# Patient Record
Sex: MALE | Race: WHITE | URBAN - METROPOLITAN AREA
[De-identification: names, ages, dates, MRNs, and addresses within clinical notes are randomized per-mention and may not be internally consistent; named-entity substitution may affect disease eponyms.]

---

## 2017-07-11 ENCOUNTER — INPATIENT (INPATIENT)
Facility: HOSPITAL | Age: 57
LOS: 2 days | Discharge: PSYCHIATRIC OR SUBSTANCE USE FACILITY | End: 2017-07-14
Attending: INTERNAL MEDICINE

## 2017-07-11 DIAGNOSIS — R41.82 ALTERED MENTAL STATUS, UNSPECIFIED: ICD-10-CM

## 2017-07-11 DIAGNOSIS — F20.9 SCHIZOPHRENIA, UNSPECIFIED: ICD-10-CM

## 2017-07-11 DIAGNOSIS — R56.9 UNSPECIFIED CONVULSIONS: ICD-10-CM

## 2017-07-11 DIAGNOSIS — F25.9 SCHIZOAFFECTIVE DISORDER, UNSPECIFIED: ICD-10-CM

## 2017-07-14 ENCOUNTER — INPATIENT (INPATIENT)
Facility: HOSPITAL | Age: 57
LOS: 1 days | Discharge: ANOTHER HOSPITAL | End: 2017-07-16
Attending: PSYCHIATRY & NEUROLOGY

## 2017-07-14 DIAGNOSIS — R56.9 UNSPECIFIED CONVULSIONS: ICD-10-CM

## 2017-07-14 DIAGNOSIS — F20.9 SCHIZOPHRENIA, UNSPECIFIED: ICD-10-CM

## 2017-07-14 DIAGNOSIS — F25.9 SCHIZOAFFECTIVE DISORDER, UNSPECIFIED: ICD-10-CM

## 2017-07-14 DIAGNOSIS — R41.82 ALTERED MENTAL STATUS, UNSPECIFIED: ICD-10-CM

## 2017-07-16 ENCOUNTER — INPATIENT (INPATIENT)
Facility: HOSPITAL | Age: 57
LOS: 0 days | Discharge: PSYCHIATRIC OR SUBSTANCE USE FACILITY | End: 2017-07-17
Attending: HOSPITALIST

## 2017-07-16 DIAGNOSIS — R41.82 ALTERED MENTAL STATUS, UNSPECIFIED: ICD-10-CM

## 2017-07-16 DIAGNOSIS — F25.9 SCHIZOAFFECTIVE DISORDER, UNSPECIFIED: ICD-10-CM

## 2017-07-16 DIAGNOSIS — R56.9 UNSPECIFIED CONVULSIONS: ICD-10-CM

## 2017-07-16 DIAGNOSIS — F20.9 SCHIZOPHRENIA, UNSPECIFIED: ICD-10-CM

## 2017-07-17 ENCOUNTER — INPATIENT (INPATIENT)
Facility: HOSPITAL | Age: 57
LOS: 91 days | Discharge: NURSING, REHAB OR LONG-TERM CARE | End: 2017-10-17
Attending: PSYCHIATRY & NEUROLOGY

## 2017-07-17 DIAGNOSIS — F20.9 SCHIZOPHRENIA, UNSPECIFIED: ICD-10-CM

## 2017-07-17 DIAGNOSIS — F25.9 SCHIZOAFFECTIVE DISORDER, UNSPECIFIED: ICD-10-CM

## 2017-07-17 DIAGNOSIS — R56.9 UNSPECIFIED CONVULSIONS: ICD-10-CM

## 2017-07-17 DIAGNOSIS — R41.82 ALTERED MENTAL STATUS, UNSPECIFIED: ICD-10-CM

## 2017-07-18 DIAGNOSIS — N17.9 ACUTE KIDNEY FAILURE, UNSPECIFIED: ICD-10-CM

## 2017-07-18 DIAGNOSIS — G40.909 EPILEPSY, UNSPECIFIED, NOT INTRACTABLE, WITHOUT STATUS EPILEPTICUS: ICD-10-CM

## 2017-07-18 DIAGNOSIS — E87.1 HYPO-OSMOLALITY AND HYPONATREMIA: ICD-10-CM

## 2017-07-18 DIAGNOSIS — F20.9 SCHIZOPHRENIA, UNSPECIFIED: ICD-10-CM

## 2017-07-18 DIAGNOSIS — F45.8 OTHER SOMATOFORM DISORDERS: ICD-10-CM

## 2017-07-18 DIAGNOSIS — E87.2 ACIDOSIS: ICD-10-CM

## 2017-07-18 DIAGNOSIS — E87.6 HYPOKALEMIA: ICD-10-CM

## 2017-07-18 DIAGNOSIS — I10 ESSENTIAL (PRIMARY) HYPERTENSION: ICD-10-CM

## 2017-07-18 DIAGNOSIS — R63.1 POLYDIPSIA: ICD-10-CM

## 2017-07-18 DIAGNOSIS — I21.4 NON-ST ELEVATION (NSTEMI) MYOCARDIAL INFARCTION: ICD-10-CM

## 2017-07-18 DIAGNOSIS — Z78.1 PHYSICAL RESTRAINT STATUS: ICD-10-CM

## 2017-07-18 DIAGNOSIS — Z53.29 PROCEDURE AND TREATMENT NOT CARRIED OUT BECAUSE OF PATIENT'S DECISION FOR OTHER REASONS: ICD-10-CM

## 2017-07-18 DIAGNOSIS — M62.82 RHABDOMYOLYSIS: ICD-10-CM

## 2017-07-18 DIAGNOSIS — R41.0 DISORIENTATION, UNSPECIFIED: ICD-10-CM

## 2017-07-20 DIAGNOSIS — E78.5 HYPERLIPIDEMIA, UNSPECIFIED: ICD-10-CM

## 2017-07-20 DIAGNOSIS — F17.200 NICOTINE DEPENDENCE, UNSPECIFIED, UNCOMPLICATED: ICD-10-CM

## 2017-07-20 DIAGNOSIS — I80.8 PHLEBITIS AND THROMBOPHLEBITIS OF OTHER SITES: ICD-10-CM

## 2017-07-20 DIAGNOSIS — L03.114 CELLULITIS OF LEFT UPPER LIMB: ICD-10-CM

## 2017-07-20 DIAGNOSIS — F20.9 SCHIZOPHRENIA, UNSPECIFIED: ICD-10-CM

## 2017-07-20 DIAGNOSIS — R45.1 RESTLESSNESS AND AGITATION: ICD-10-CM

## 2017-07-20 DIAGNOSIS — G40.909 EPILEPSY, UNSPECIFIED, NOT INTRACTABLE, WITHOUT STATUS EPILEPTICUS: ICD-10-CM

## 2017-07-20 DIAGNOSIS — I10 ESSENTIAL (PRIMARY) HYPERTENSION: ICD-10-CM

## 2017-07-20 DIAGNOSIS — E22.2 SYNDROME OF INAPPROPRIATE SECRETION OF ANTIDIURETIC HORMONE: ICD-10-CM

## 2017-07-20 DIAGNOSIS — R41.0 DISORIENTATION, UNSPECIFIED: ICD-10-CM

## 2017-07-28 DIAGNOSIS — F20.0 PARANOID SCHIZOPHRENIA: ICD-10-CM

## 2017-07-28 DIAGNOSIS — F23 BRIEF PSYCHOTIC DISORDER: ICD-10-CM

## 2017-07-28 DIAGNOSIS — R63.1 POLYDIPSIA: ICD-10-CM

## 2017-07-28 DIAGNOSIS — I10 ESSENTIAL (PRIMARY) HYPERTENSION: ICD-10-CM

## 2017-07-28 DIAGNOSIS — I21.4 NON-ST ELEVATION (NSTEMI) MYOCARDIAL INFARCTION: ICD-10-CM

## 2017-07-28 DIAGNOSIS — G40.909 EPILEPSY, UNSPECIFIED, NOT INTRACTABLE, WITHOUT STATUS EPILEPTICUS: ICD-10-CM

## 2017-07-28 DIAGNOSIS — E87.1 HYPO-OSMOLALITY AND HYPONATREMIA: ICD-10-CM

## 2017-10-12 PROBLEM — Z00.00 ENCOUNTER FOR PREVENTIVE HEALTH EXAMINATION: Status: ACTIVE | Noted: 2017-10-12

## 2017-10-19 DIAGNOSIS — I21.4 NON-ST ELEVATION (NSTEMI) MYOCARDIAL INFARCTION: ICD-10-CM

## 2017-10-19 DIAGNOSIS — I25.10 ATHEROSCLEROTIC HEART DISEASE OF NATIVE CORONARY ARTERY WITHOUT ANGINA PECTORIS: ICD-10-CM

## 2017-10-19 DIAGNOSIS — R45.851 SUICIDAL IDEATIONS: ICD-10-CM

## 2017-10-19 DIAGNOSIS — Z95.5 PRESENCE OF CORONARY ANGIOPLASTY IMPLANT AND GRAFT: ICD-10-CM

## 2017-10-19 DIAGNOSIS — I10 ESSENTIAL (PRIMARY) HYPERTENSION: ICD-10-CM

## 2017-10-19 DIAGNOSIS — F25.9 SCHIZOAFFECTIVE DISORDER, UNSPECIFIED: ICD-10-CM

## 2017-10-19 DIAGNOSIS — E87.1 HYPO-OSMOLALITY AND HYPONATREMIA: ICD-10-CM

## 2017-10-19 DIAGNOSIS — G40.909 EPILEPSY, UNSPECIFIED, NOT INTRACTABLE, WITHOUT STATUS EPILEPTICUS: ICD-10-CM

## 2017-10-19 DIAGNOSIS — F20.0 PARANOID SCHIZOPHRENIA: ICD-10-CM

## 2018-09-20 ENCOUNTER — OUTPATIENT (OUTPATIENT)
Dept: OUTPATIENT SERVICES | Facility: HOSPITAL | Age: 58
LOS: 1 days | Discharge: HOME | End: 2018-09-20

## 2018-11-21 ENCOUNTER — OUTPATIENT (OUTPATIENT)
Dept: OUTPATIENT SERVICES | Facility: HOSPITAL | Age: 58
LOS: 1 days | Discharge: HOME | End: 2018-11-21

## 2018-11-30 DIAGNOSIS — K02.63 DENTAL CARIES ON SMOOTH SURFACE PENETRATING INTO PULP: ICD-10-CM

## 2019-05-06 ENCOUNTER — EMERGENCY (EMERGENCY)
Facility: HOSPITAL | Age: 59
LOS: 0 days | Discharge: HOME | End: 2019-05-06
Admitting: EMERGENCY MEDICINE
Payer: MEDICARE

## 2019-05-06 VITALS
SYSTOLIC BLOOD PRESSURE: 170 MMHG | WEIGHT: 229.94 LBS | TEMPERATURE: 98 F | DIASTOLIC BLOOD PRESSURE: 118 MMHG | OXYGEN SATURATION: 99 % | RESPIRATION RATE: 18 BRPM | HEART RATE: 86 BPM

## 2019-05-06 DIAGNOSIS — M54.9 DORSALGIA, UNSPECIFIED: ICD-10-CM

## 2019-05-06 PROCEDURE — 99283 EMERGENCY DEPT VISIT LOW MDM: CPT

## 2019-05-06 RX ORDER — IBUPROFEN 200 MG
600 TABLET ORAL ONCE
Qty: 0 | Refills: 0 | Status: COMPLETED | OUTPATIENT
Start: 2019-05-06 | End: 2019-05-06

## 2019-05-06 RX ADMIN — Medication 600 MILLIGRAM(S): at 01:37

## 2019-05-06 NOTE — ED PROVIDER NOTE - NSFOLLOWUPCLINICS_GEN_ALL_ED_FT
[facility]  Rehabilitation  [address] [place] [address]  Phone: [phone]  Fax:   Follow Up Time:

## 2019-05-06 NOTE — ED PROVIDER NOTE - PROGRESS NOTE DETAILS
c/w mechanical back pain.  no neuro red flags.  sx are reproducible with position. aorta/cardiac not supported.  imaging not indicated at this time.  r/b of curtis sutton pt.

## 2019-05-06 NOTE — ED PROVIDER NOTE - OBJECTIVE STATEMENT
"I have a sprain in my spinal cord for 2 years"  pt reports pain when using showerhead on hose for 2 years  has not seen anyone for sxs  pain is sharp, nonradiating, moderate, intermittent  denies exacerbating or relieving factors  Denies radiation pain/incontinence/numbness/saddle parathesia/legs weakness and difficulty on ambulation. denies fever/chill/HA/dizziness/chest pain/sob/abd pain/n/v/d/ urinary sxs

## 2019-05-06 NOTE — ED PROVIDER NOTE - PHYSICAL EXAMINATION
CONSTITUTIONAL: Well-appearing; well-nourished; in no apparent distress.   CARDIOVASCULAR: Normal S1, S2; no murmurs, rubs, or gallops.   RESPIRATORY: Normal chest excursion with respiration; breath sounds clear and equal bilaterally; no wheezes, rhonchi, or rales.  GI/: Normal bowel sounds; non-distended; non-tender; no palpable organomegaly.   MS: No evidence of trauma or deformity. Non-tender to palpation. No CVA tenderness. Normal ROM in all four extremities; non-tender to palpation; distal pulses are normal.   SKIN: Normal for age and race; warm; dry; good turgor; no apparent lesions or exudate.   NEURO/PSYCH: A & O x 4; grossly unremarkable. mood and manner are appropriate. Grooming and personal hygiene are appropriate. No apparent thoughts of harm to self or others.

## 2019-05-08 VITALS — HEART RATE: 89 BPM | OXYGEN SATURATION: 99 %

## 2019-06-14 ENCOUNTER — EMERGENCY (EMERGENCY)
Facility: HOSPITAL | Age: 59
LOS: 0 days | Discharge: HOME | End: 2019-06-14
Attending: EMERGENCY MEDICINE | Admitting: EMERGENCY MEDICINE
Payer: MEDICARE

## 2019-06-14 VITALS
RESPIRATION RATE: 18 BRPM | SYSTOLIC BLOOD PRESSURE: 163 MMHG | OXYGEN SATURATION: 98 % | DIASTOLIC BLOOD PRESSURE: 93 MMHG | HEART RATE: 56 BPM

## 2019-06-14 VITALS
RESPIRATION RATE: 17 BRPM | SYSTOLIC BLOOD PRESSURE: 139 MMHG | DIASTOLIC BLOOD PRESSURE: 99 MMHG | HEART RATE: 63 BPM | OXYGEN SATURATION: 94 % | TEMPERATURE: 98 F

## 2019-06-14 DIAGNOSIS — F32.9 MAJOR DEPRESSIVE DISORDER, SINGLE EPISODE, UNSPECIFIED: ICD-10-CM

## 2019-06-14 DIAGNOSIS — I10 ESSENTIAL (PRIMARY) HYPERTENSION: ICD-10-CM

## 2019-06-14 DIAGNOSIS — Z79.82 LONG TERM (CURRENT) USE OF ASPIRIN: ICD-10-CM

## 2019-06-14 DIAGNOSIS — Z79.899 OTHER LONG TERM (CURRENT) DRUG THERAPY: ICD-10-CM

## 2019-06-14 DIAGNOSIS — Z88.7 ALLERGY STATUS TO SERUM AND VACCINE: ICD-10-CM

## 2019-06-14 DIAGNOSIS — F20.9 SCHIZOPHRENIA, UNSPECIFIED: ICD-10-CM

## 2019-06-14 PROCEDURE — 99284 EMERGENCY DEPT VISIT MOD MDM: CPT

## 2019-06-14 NOTE — ED PROVIDER NOTE - ATTENDING CONTRIBUTION TO CARE
58 year old male, pmhx HTN, presenting with elevated BP from adult home. Per facility patient's BP has been 170s systolic x 4 days. Patient states he had 1 episode of lightheadedness last week but has been asymptomatic since then. States he has been taking his medications as prescribed. Denies fevers, headache, vision changes, weakness/numbness, confusion, URI symptoms, neck pain, chest pain, back pain, dyspnea, cough, palpitations, nausea, vomiting, abdominal pain, diarrhea, constipation, blood in stool/dark stools, urinary symptoms, penile discharge/testicular pain, leg swelling, rash, recent travel or sick contacts.    Vital Signs: I have reviewed the initial vital signs.  Constitutional: NAD, well-nourished, appears stated age, no acute distress.  HEENT: Airway patent, moist MM, no erythema/swelling/deformity of oral structures. EOMI, PERRLA.  CV: regular rate, regular rhythm, well-perfused extremities, 2+ b/l DP and radial pulses equal.  Lungs: BCTA, no increased WOB.  ABD: NTND, no guarding or rebound, no pulsatile mass, no hernias.   MSK: Neck supple, nontender, nl ROM, no stepoff. Chest nontender. Back nontender in TLS spine or to b/l bony structures or flanks. Ext nontender, nl rom, no deformity.   INTEG: Skin warm, dry, no rash.  NEURO: A&Ox3, normal strength, nl sensation throughout, normal speech.   PSYCH: Calm, cooperative, normal affect and interaction.    Fully neuro intact,  systolic at triage and then in 160s, equal both arms. Will obtain EKG for 1 episode of lightheadedness last week but patient otherwise asymptomatic HTN and therefore if no significant findings on EKG patient can be discharged back to adult home with PMD follow up.

## 2019-06-14 NOTE — ED PROVIDER NOTE - NSFOLLOWUPINSTRUCTIONS_ED_ALL_ED_FT
Hypertension  Hypertension, commonly called high blood pressure, is when the force of blood pumping through the arteries is too strong. The arteries are the blood vessels that carry blood from the heart throughout the body. Hypertension forces the heart to work harder to pump blood and may cause arteries to become narrow or stiff. Having untreated or uncontrolled hypertension can cause heart attacks, strokes, kidney disease, and other problems.    A blood pressure reading consists of a higher number over a lower number. Ideally, your blood pressure should be below 120/80. The first ("top") number is called the systolic pressure. It is a measure of the pressure in your arteries as your heart beats. The second ("bottom") number is called the diastolic pressure. It is a measure of the pressure in your arteries as the heart relaxes.    What are the causes?  The cause of this condition is not known.    What increases the risk?  Some risk factors for high blood pressure are under your control. Others are not.    Factors you can change     Smoking.  Having type 2 diabetes mellitus, high cholesterol, or both.  Not getting enough exercise or physical activity.  Being overweight.  Having too much fat, sugar, calories, or salt (sodium) in your diet.  Drinking too much alcohol.  Factors that are difficult or impossible to change     Having chronic kidney disease.  Having a family history of high blood pressure.  Age. Risk increases with age.  Race. You may be at higher risk if you are -American.  Gender. Men are at higher risk than women before age 45. After age 65, women are at higher risk than men.  Having obstructive sleep apnea.  Stress.  What are the signs or symptoms?  Extremely high blood pressure (hypertensive crisis) may cause:    Headache.  Anxiety.  Shortness of breath.  Nosebleed.  Nausea and vomiting.  Severe chest pain.  Jerky movements you cannot control (seizures).    How is this diagnosed?  This condition is diagnosed by measuring your blood pressure while you are seated, with your arm resting on a surface. The cuff of the blood pressure monitor will be placed directly against the skin of your upper arm at the level of your heart. It should be measured at least twice using the same arm. Certain conditions can cause a difference in blood pressure between your right and left arms.    Certain factors can cause blood pressure readings to be lower or higher than normal (elevated) for a short period of time:    When your blood pressure is higher when you are in a health care provider's office than when you are at home, this is called white coat hypertension. Most people with this condition do not need medicines.  When your blood pressure is higher at home than when you are in a health care provider's office, this is called masked hypertension. Most people with this condition may need medicines to control blood pressure.    If you have a high blood pressure reading during one visit or you have normal blood pressure with other risk factors:    You may be asked to return on a different day to have your blood pressure checked again.  You may be asked to monitor your blood pressure at home for 1 week or longer.    If you are diagnosed with hypertension, you may have other blood or imaging tests to help your health care provider understand your overall risk for other conditions.    How is this treated?  This condition is treated by making healthy lifestyle changes, such as eating healthy foods, exercising more, and reducing your alcohol intake. Your health care provider may prescribe medicine if lifestyle changes are not enough to get your blood pressure under control, and if:    Your systolic blood pressure is above 130.  Your diastolic blood pressure is above 80.    Your personal target blood pressure may vary depending on your medical conditions, your age, and other factors.    Follow these instructions at home:  Eating and drinking     Eat a diet that is high in fiber and potassium, and low in sodium, added sugar, and fat. An example eating plan is called the DASH (Dietary Approaches to Stop Hypertension) diet. To eat this way:    Eat plenty of fresh fruits and vegetables. Try to fill half of your plate at each meal with fruits and vegetables.  Eat whole grains, such as whole wheat pasta, brown rice, or whole grain bread. Fill about one quarter of your plate with whole grains.  Eat or drink low-fat dairy products, such as skim milk or low-fat yogurt.  Avoid fatty cuts of meat, processed or cured meats, and poultry with skin. Fill about one quarter of your plate with lean proteins, such as fish, chicken without skin, beans, eggs, and tofu.  Avoid premade and processed foods. These tend to be higher in sodium, added sugar, and fat.    Reduce your daily sodium intake. Most people with hypertension should eat less than 1,500 mg of sodium a day.  ImageLimit alcohol intake to no more than 1 drink a day for nonpregnant women and 2 drinks a day for men. One drink equals 12 oz of beer, 5 oz of wine, or 1½ oz of hard liquor.  Lifestyle     Work with your health care provider to maintain a healthy body weight or to lose weight. Ask what an ideal weight is for you.  Get at least 30 minutes of exercise that causes your heart to beat faster (aerobic exercise) most days of the week. Activities may include walking, swimming, or biking.  Include exercise to strengthen your muscles (resistance exercise), such as pilates or lifting weights, as part of your weekly exercise routine. Try to do these types of exercises for 30 minutes at least 3 days a week.  Do not use any products that contain nicotine or tobacco, such as cigarettes and e-cigarettes. If you need help quitting, ask your health care provider.  Monitor your blood pressure at home as told by your health care provider.  Keep all follow-up visits as told by your health care provider. This is important.  Medicines     Take over-the-counter and prescription medicines only as told by your health care provider. Follow directions carefully. Blood pressure medicines must be taken as prescribed.  Do not skip doses of blood pressure medicine. Doing this puts you at risk for problems and can make the medicine less effective.  Ask your health care provider about side effects or reactions to medicines that you should watch for.  Contact a health care provider if:  You think you are having a reaction to a medicine you are taking.  You have headaches that keep coming back (recurring).  You feel dizzy.  You have swelling in your ankles.  You have trouble with your vision.  Get help right away if:  You develop a severe headache or confusion.  You have unusual weakness or numbness.  You feel faint.  You have severe pain in your chest or abdomen.  You vomit repeatedly.  You have trouble breathing.  Summary  Hypertension is when the force of blood pumping through your arteries is too strong. If this condition is not controlled, it may put you at risk for serious complications.  Your personal target blood pressure may vary depending on your medical conditions, your age, and other factors. For most people, a normal blood pressure is less than 120/80.  Hypertension is treated with lifestyle changes, medicines, or a combination of both. Lifestyle changes include weight loss, eating a healthy, low-sodium diet, exercising more, and limiting alcohol.  This information is not intended to replace advice given to you by your health care provider. Make sure you discuss any questions you have with your health care provider.

## 2019-06-14 NOTE — ED ADULT NURSE NOTE - CHIEF COMPLAINT QUOTE
BIBA via [facility] from [facility] for eval, pt complaining of lightheadedness for 1 week and HTN at facility 170/100, Denies Chest Pain, Denies Shortness of breath

## 2019-06-14 NOTE — ED ADULT TRIAGE NOTE - CHIEF COMPLAINT QUOTE
BIBA via [facility] from [facility] for eval, pt complaining of lightheadedness for 1 week and HTN at facility 170/100, Denies Chest Pain, Denies Shortness of breath

## 2019-06-14 NOTE — ED PROVIDER NOTE - CLINICAL SUMMARY MEDICAL DECISION MAKING FREE TEXT BOX
Patient presented with HTN from adult home, otherwise had 1 episode of lightheadedness last week which has since resolved. No other symptoms. On arrival BP in 130s systolic and then 160s systolic. EKG obtained and completely unremarkable. Given symptoms occurred 1 week ago and patient has been asymptomatic since, normal EKG means etiology unlikely cardiac. Given patient asymptomatic in the setting of mild HTN, will discharge back to adult home for PMD follow up and outpatient management. Patient agreeable with plan. Agrees to return to ED for any new or worsening symptoms.

## 2019-06-14 NOTE — ED PROVIDER NOTE - OBJECTIVE STATEMENT
Patient sent FROM adult home for HTN,  /100 for past 4 days, PMD not available, Patient compliant with his BP meds. Denies chest pain, SOB, HA, Sent to ED for eval. /99 on arrival

## 2019-07-15 ENCOUNTER — OUTPATIENT (OUTPATIENT)
Dept: OUTPATIENT SERVICES | Facility: HOSPITAL | Age: 59
LOS: 1 days | Discharge: HOME | End: 2019-07-15

## 2019-07-15 PROBLEM — F32.9 MAJOR DEPRESSIVE DISORDER, SINGLE EPISODE, UNSPECIFIED: Chronic | Status: ACTIVE | Noted: 2019-06-14

## 2019-07-15 PROBLEM — E78.5 HYPERLIPIDEMIA, UNSPECIFIED: Chronic | Status: ACTIVE | Noted: 2019-06-14

## 2019-07-15 PROBLEM — F20.9 SCHIZOPHRENIA, UNSPECIFIED: Chronic | Status: ACTIVE | Noted: 2019-06-14

## 2019-07-15 PROBLEM — I10 ESSENTIAL (PRIMARY) HYPERTENSION: Chronic | Status: ACTIVE | Noted: 2019-06-14

## 2019-07-17 ENCOUNTER — INPATIENT (INPATIENT)
Facility: HOSPITAL | Age: 59
LOS: 18 days | Discharge: HOME | End: 2019-08-05
Attending: PSYCHIATRY & NEUROLOGY | Admitting: PSYCHIATRY & NEUROLOGY
Payer: MEDICARE

## 2019-07-17 VITALS
WEIGHT: 149.91 LBS | HEART RATE: 84 BPM | OXYGEN SATURATION: 95 % | RESPIRATION RATE: 18 BRPM | TEMPERATURE: 99 F | SYSTOLIC BLOOD PRESSURE: 173 MMHG | DIASTOLIC BLOOD PRESSURE: 93 MMHG

## 2019-07-17 DIAGNOSIS — F32.3 MAJOR DEPRESSIVE DISORDER, SINGLE EPISODE, SEVERE WITH PSYCHOTIC FEATURES: ICD-10-CM

## 2019-07-17 DIAGNOSIS — F32.9 MAJOR DEPRESSIVE DISORDER, SINGLE EPISODE, UNSPECIFIED: ICD-10-CM

## 2019-07-17 DIAGNOSIS — R45.851 SUICIDAL IDEATIONS: ICD-10-CM

## 2019-07-17 LAB
ALBUMIN SERPL ELPH-MCNC: 4 G/DL — SIGNIFICANT CHANGE UP (ref 3.5–5.2)
ALP SERPL-CCNC: 76 U/L — SIGNIFICANT CHANGE UP (ref 30–115)
ALT FLD-CCNC: 8 U/L — SIGNIFICANT CHANGE UP (ref 0–41)
ANION GAP SERPL CALC-SCNC: 13 MMOL/L — SIGNIFICANT CHANGE UP (ref 7–14)
APAP SERPL-MCNC: <5 UG/ML — LOW (ref 10–30)
APPEARANCE UR: CLEAR — SIGNIFICANT CHANGE UP
AST SERPL-CCNC: 12 U/L — SIGNIFICANT CHANGE UP (ref 0–41)
BASOPHILS # BLD AUTO: 0.05 K/UL — SIGNIFICANT CHANGE UP (ref 0–0.2)
BASOPHILS NFR BLD AUTO: 0.7 % — SIGNIFICANT CHANGE UP (ref 0–1)
BILIRUB SERPL-MCNC: 0.2 MG/DL — SIGNIFICANT CHANGE UP (ref 0.2–1.2)
BILIRUB UR-MCNC: NEGATIVE — SIGNIFICANT CHANGE UP
BUN SERPL-MCNC: 11 MG/DL — SIGNIFICANT CHANGE UP (ref 10–20)
CALCIUM SERPL-MCNC: 9.4 MG/DL — SIGNIFICANT CHANGE UP (ref 8.5–10.1)
CHLORIDE SERPL-SCNC: 101 MMOL/L — SIGNIFICANT CHANGE UP (ref 98–110)
CO2 SERPL-SCNC: 24 MMOL/L — SIGNIFICANT CHANGE UP (ref 17–32)
COLOR SPEC: YELLOW — SIGNIFICANT CHANGE UP
CREAT SERPL-MCNC: 0.9 MG/DL — SIGNIFICANT CHANGE UP (ref 0.7–1.5)
DIFF PNL FLD: NEGATIVE — SIGNIFICANT CHANGE UP
EOSINOPHIL # BLD AUTO: 0.24 K/UL — SIGNIFICANT CHANGE UP (ref 0–0.7)
EOSINOPHIL NFR BLD AUTO: 3.5 % — SIGNIFICANT CHANGE UP (ref 0–8)
ETHANOL SERPL-MCNC: <10 MG/DL — SIGNIFICANT CHANGE UP
GLUCOSE SERPL-MCNC: 112 MG/DL — HIGH (ref 70–99)
GLUCOSE UR QL: NEGATIVE MG/DL — SIGNIFICANT CHANGE UP
HCT VFR BLD CALC: 37.9 % — LOW (ref 42–52)
HGB BLD-MCNC: 13.3 G/DL — LOW (ref 14–18)
IMM GRANULOCYTES NFR BLD AUTO: 0.3 % — SIGNIFICANT CHANGE UP (ref 0.1–0.3)
KETONES UR-MCNC: NEGATIVE — SIGNIFICANT CHANGE UP
LEUKOCYTE ESTERASE UR-ACNC: NEGATIVE — SIGNIFICANT CHANGE UP
LYMPHOCYTES # BLD AUTO: 2.26 K/UL — SIGNIFICANT CHANGE UP (ref 1.2–3.4)
LYMPHOCYTES # BLD AUTO: 33.2 % — SIGNIFICANT CHANGE UP (ref 20.5–51.1)
MCHC RBC-ENTMCNC: 28.7 PG — SIGNIFICANT CHANGE UP (ref 27–31)
MCHC RBC-ENTMCNC: 35.1 G/DL — SIGNIFICANT CHANGE UP (ref 32–37)
MCV RBC AUTO: 81.9 FL — SIGNIFICANT CHANGE UP (ref 80–94)
MONOCYTES # BLD AUTO: 0.71 K/UL — HIGH (ref 0.1–0.6)
MONOCYTES NFR BLD AUTO: 10.4 % — HIGH (ref 1.7–9.3)
NEUTROPHILS # BLD AUTO: 3.52 K/UL — SIGNIFICANT CHANGE UP (ref 1.4–6.5)
NEUTROPHILS NFR BLD AUTO: 51.9 % — SIGNIFICANT CHANGE UP (ref 42.2–75.2)
NITRITE UR-MCNC: NEGATIVE — SIGNIFICANT CHANGE UP
NRBC # BLD: 0 /100 WBCS — SIGNIFICANT CHANGE UP (ref 0–0)
PH UR: 7 — SIGNIFICANT CHANGE UP (ref 5–8)
PLATELET # BLD AUTO: 162 K/UL — SIGNIFICANT CHANGE UP (ref 130–400)
POTASSIUM SERPL-MCNC: 3.5 MMOL/L — SIGNIFICANT CHANGE UP (ref 3.5–5)
POTASSIUM SERPL-SCNC: 3.5 MMOL/L — SIGNIFICANT CHANGE UP (ref 3.5–5)
PROT SERPL-MCNC: 6.4 G/DL — SIGNIFICANT CHANGE UP (ref 6–8)
PROT UR-MCNC: NEGATIVE MG/DL — SIGNIFICANT CHANGE UP
RBC # BLD: 4.63 M/UL — LOW (ref 4.7–6.1)
RBC # FLD: 13.3 % — SIGNIFICANT CHANGE UP (ref 11.5–14.5)
SALICYLATES SERPL-MCNC: <0.3 MG/DL — LOW (ref 4–30)
SODIUM SERPL-SCNC: 138 MMOL/L — SIGNIFICANT CHANGE UP (ref 135–146)
SP GR SPEC: 1.01 — SIGNIFICANT CHANGE UP (ref 1.01–1.03)
UROBILINOGEN FLD QL: 0.2 MG/DL — SIGNIFICANT CHANGE UP (ref 0.2–0.2)
WBC # BLD: 6.8 K/UL — SIGNIFICANT CHANGE UP (ref 4.8–10.8)
WBC # FLD AUTO: 6.8 K/UL — SIGNIFICANT CHANGE UP (ref 4.8–10.8)

## 2019-07-17 PROCEDURE — 99285 EMERGENCY DEPT VISIT HI MDM: CPT

## 2019-07-17 PROCEDURE — 90792 PSYCH DIAG EVAL W/MED SRVCS: CPT

## 2019-07-17 RX ORDER — BENZTROPINE MESYLATE 1 MG
1 TABLET ORAL
Qty: 0 | Refills: 0 | DISCHARGE

## 2019-07-17 RX ORDER — HYDROXYZINE HCL 10 MG
25 TABLET ORAL EVERY 6 HOURS
Refills: 0 | Status: DISCONTINUED | OUTPATIENT
Start: 2019-07-17 | End: 2019-08-05

## 2019-07-17 RX ORDER — METOPROLOL TARTRATE 50 MG
1 TABLET ORAL
Qty: 0 | Refills: 0 | DISCHARGE

## 2019-07-17 RX ORDER — QUETIAPINE FUMARATE 200 MG/1
1 TABLET, FILM COATED ORAL
Qty: 0 | Refills: 0 | DISCHARGE

## 2019-07-17 RX ORDER — DOCUSATE SODIUM 100 MG
1 CAPSULE ORAL
Qty: 0 | Refills: 0 | DISCHARGE

## 2019-07-17 RX ORDER — VALPROIC ACID (AS SODIUM SALT) 250 MG/5ML
500 SOLUTION, ORAL ORAL
Qty: 0 | Refills: 0 | DISCHARGE

## 2019-07-17 RX ORDER — SODIUM CHLORIDE 9 MG/ML
0 INJECTION INTRAMUSCULAR; INTRAVENOUS; SUBCUTANEOUS
Qty: 0 | Refills: 0 | DISCHARGE

## 2019-07-17 RX ADMIN — Medication 25 MILLIGRAM(S): at 21:57

## 2019-07-17 NOTE — ED PROVIDER NOTE - NS ED ROS FT
Review of Systems:  	•	CONSTITUTIONAL - no fever, no diaphoresis, no chills  	•	SKIN - no rash  	•	HEMATOLOGIC - no bleeding, no bruising  	•	EYES - no eye pain, no blurry vision  	•	ENT - no change in hearing, no sore throat, no ear pain or tinnitus  	•	RESPIRATORY - no shortness of breath, no cough  	•	CARDIAC - no chest pain, no palpitations  	•	GI - no abd pain, no nausea, no vomiting, no diarrhea, no constipation    	•	MUSCULOSKELETAL - no joint paint, no swelling, no redness  	•	NEUROLOGIC - no weakness, no headache, no paresthesias, no LOC  	•	PSYCH - no anxiety, suicidal, non homicidal, no hallucination, no depression

## 2019-07-17 NOTE — ED ADULT NURSE NOTE - NSIMPLEMENTINTERV_GEN_ALL_ED
Implemented All Fall with Harm Risk Interventions:  [place] to call system. Call bell, personal items and telephone within reach. Instruct patient to call for assistance. Room bathroom lighting operational. Non-slip footwear when patient is off stretcher. Physically safe environment: no spills, clutter or unnecessary equipment. Stretcher in lowest position, wheels locked, appropriate side rails in place. Provide visual cue, wrist band, yellow gown, etc. Monitor gait and stability. Monitor for mental status changes and reorient to person, place, and time. Review medications for side effects contributing to fall risk. Reinforce activity limits and safety measures with patient and family. Provide visual clues: red socks.

## 2019-07-17 NOTE — H&P ADULT - HISTORY OF PRESENT ILLNESS
58 YEARS OLD MALE WITH PMH OF DEPRESSION, HTN , HYPERLIPIDEMIA AND SCHIZOPHRENIA COME TO ER C/O SUICIDAL THOUGHTS .

## 2019-07-17 NOTE — ED BEHAVIORAL HEALTH ASSESSMENT NOTE - EMPLOYMENT
Formulary for [employer] insurance noted with Lansoprazole 30 mg as preferred PPI.     RX send to pharmacy as ordered.    Message left for pt to call the office.  dillon       Disabled

## 2019-07-17 NOTE — H&P ADULT - NSHPPHYSICALEXAM_GEN_ALL_CORE
Vital Signs Last 24 Hrs  T(C): 37.1 (07-17-19 @ 15:52)  T(F): 98.8 (07-17-19 @ 15:52), Max: 98.8 (07-17-19 @ 15:52)  HR: 76 (07-17-19 @ 22:00) (76 - 84)  BP: 160/75 (07-17-19 @ 22:00)  BP(mean): --  RR: 18 (07-17-19 @ 15:52) (18 - 18)  SpO2: 95% (07-17-19 @ 15:52) (95% - 95%)  Wt(kg): --

## 2019-07-17 NOTE — ED PROVIDER NOTE - OBJECTIVE STATEMENT
this is 59 yo male presents to ed for evaluation . patient from [facility] presents for suicidal thoughts.

## 2019-07-17 NOTE — PATIENT PROFILE BEHAVIORAL HEALTH - DOES PATIENT HAVE ADVANCE DIRECTIVE
Left message for patient to contact office at [phone].     pt. uncooperative and not answering some questons/No

## 2019-07-17 NOTE — ED PROVIDER NOTE - ATTENDING CONTRIBUTION TO CARE
59 yo M PMHx noted including depression, schizophrenia, HTN presents from residence with c/o depression , having suicidal thoughts, thinking of harming self with scissor,  no AV hallucination, no CP, n SOB.  On exam pt in NAD AAO x 3, no signs of trauma . steady gait, Lungs CAT B/L no wrr, abd is soft nt nd, no edema

## 2019-07-17 NOTE — H&P ADULT - NSHPLABSRESULTS_GEN_ALL_CORE
13.3   6.80  )-----------( 162      ( 2019 19:30 )             37.9       07-17    138  |  101  |  11  ----------------------------<  112<H>  3.5   |  24  |  0.9    Ca    9.4      2019 19:30    TPro  6.4  /  Alb  4.0  /  TBili  0.2  /  DBili  x   /  AST  12  /  ALT  8   /  AlkPhos  76  07-              Urinalysis Basic - ( 2019 19:30 )    Color: Yellow / Appearance: Clear / S.015 / pH: x  Gluc: x / Ketone: Negative  / Bili: Negative / Urobili: 0.2 mg/dL   Blood: x / Protein: Negative mg/dL / Nitrite: Negative   Leuk Esterase: Negative / RBC: x / WBC x   Sq Epi: x / Non Sq Epi: x / Bacteria: x            Lactate Trend            CAPILLARY BLOOD GLUCOSE

## 2019-07-17 NOTE — ED BEHAVIORAL HEALTH ASSESSMENT NOTE - HPI (INCLUDE ILLNESS QUALITY, SEVERITY, DURATION, TIMING, CONTEXT, MODIFYING FACTORS, ASSOCIATED SIGNS AND SYMPTOMS)
58 yrs. old male, resident of [facility], increasingly depressed and having a suicidal ideation. Pt. stated residents of adult home are bothering him for cigar and making him angry. So he is feeling depressed and wanted cut his wrist. He is feeling hopeless and helpless. he is sleeping good. He has suicidal thoughts since last one month. He stated that he did not want to live any more and wanted to end his life. He stated he is taking his medications but did not remember names. He denied hearing voices but stated he is seeing things that are not there. He is feeing paranoid. He stated that he was at [facility] in 2015 and 2017. His medications changed but has severe side effects and so stopped and restarted again.  Collateral obtained from brother who stated that he was sent to [facility] for same thing twice and was released. He feels that it is unsafe for him into community.

## 2019-07-17 NOTE — ED BEHAVIORAL HEALTH ASSESSMENT NOTE - SUMMARY
58 yrs. old male with H/O depression ,feeling hopeless and helpless with suicidal ideations ,wanted to cut his wrist. He is mild paranoid. He has been having suicidal ideations since last moths and had frequent ER visits. His brother is very concern.

## 2019-07-18 DIAGNOSIS — Z01.20 ENCOUNTER FOR DENTAL EXAMINATION AND CLEANING WITHOUT ABNORMAL FINDINGS: ICD-10-CM

## 2019-07-18 DIAGNOSIS — I10 ESSENTIAL (PRIMARY) HYPERTENSION: ICD-10-CM

## 2019-07-18 DIAGNOSIS — Z02.9 ENCOUNTER FOR ADMINISTRATIVE EXAMINATIONS, UNSPECIFIED: ICD-10-CM

## 2019-07-18 LAB
AMPHET UR-MCNC: NEGATIVE — SIGNIFICANT CHANGE UP
BARBITURATES UR SCN-MCNC: NEGATIVE — SIGNIFICANT CHANGE UP
BENZODIAZ UR-MCNC: NEGATIVE — SIGNIFICANT CHANGE UP
COCAINE METAB.OTHER UR-MCNC: NEGATIVE — SIGNIFICANT CHANGE UP
CULTURE RESULTS: NO GROWTH — SIGNIFICANT CHANGE UP
DRUG SCREEN 1, URINE RESULT: SIGNIFICANT CHANGE UP
METHADONE UR-MCNC: NEGATIVE — SIGNIFICANT CHANGE UP
OPIATES UR-MCNC: NEGATIVE — SIGNIFICANT CHANGE UP
PCP UR-MCNC: NEGATIVE — SIGNIFICANT CHANGE UP
PROPOXYPHENE QUALITATIVE URINE RESULT: NEGATIVE — SIGNIFICANT CHANGE UP
SPECIMEN SOURCE: SIGNIFICANT CHANGE UP
THC UR QL: NEGATIVE — SIGNIFICANT CHANGE UP

## 2019-07-18 PROCEDURE — 99232 SBSQ HOSP IP/OBS MODERATE 35: CPT

## 2019-07-18 RX ORDER — DIVALPROEX SODIUM 500 MG/1
500 TABLET, DELAYED RELEASE ORAL
Refills: 0 | Status: DISCONTINUED | OUTPATIENT
Start: 2019-07-18 | End: 2019-07-26

## 2019-07-18 RX ORDER — DIPHENHYDRAMINE HCL 50 MG
50 CAPSULE ORAL ONCE
Refills: 0 | Status: COMPLETED | OUTPATIENT
Start: 2019-07-18 | End: 2019-07-18

## 2019-07-18 RX ORDER — AMLODIPINE BESYLATE 2.5 MG/1
5 TABLET ORAL DAILY
Refills: 0 | Status: DISCONTINUED | OUTPATIENT
Start: 2019-07-18 | End: 2019-07-18

## 2019-07-18 RX ORDER — BENZTROPINE MESYLATE 1 MG
0.5 TABLET ORAL
Refills: 0 | Status: DISCONTINUED | OUTPATIENT
Start: 2019-07-18 | End: 2019-08-05

## 2019-07-18 RX ORDER — HALOPERIDOL DECANOATE 100 MG/ML
10 INJECTION INTRAMUSCULAR
Refills: 0 | Status: DISCONTINUED | OUTPATIENT
Start: 2019-07-18 | End: 2019-08-05

## 2019-07-18 RX ORDER — NICOTINE POLACRILEX 2 MG
1 GUM BUCCAL DAILY
Refills: 0 | Status: DISCONTINUED | OUTPATIENT
Start: 2019-07-18 | End: 2019-08-05

## 2019-07-18 RX ORDER — QUETIAPINE FUMARATE 200 MG/1
100 TABLET, FILM COATED ORAL DAILY
Refills: 0 | Status: DISCONTINUED | OUTPATIENT
Start: 2019-07-18 | End: 2019-08-05

## 2019-07-18 RX ORDER — LOSARTAN POTASSIUM 100 MG/1
100 TABLET, FILM COATED ORAL DAILY
Refills: 0 | Status: DISCONTINUED | OUTPATIENT
Start: 2019-07-18 | End: 2019-08-05

## 2019-07-18 RX ORDER — QUETIAPINE FUMARATE 200 MG/1
300 TABLET, FILM COATED ORAL AT BEDTIME
Refills: 0 | Status: DISCONTINUED | OUTPATIENT
Start: 2019-07-18 | End: 2019-08-05

## 2019-07-18 RX ORDER — AMLODIPINE BESYLATE 2.5 MG/1
10 TABLET ORAL AT BEDTIME
Refills: 0 | Status: DISCONTINUED | OUTPATIENT
Start: 2019-07-18 | End: 2019-08-05

## 2019-07-18 RX ADMIN — Medication 1 PATCH: at 23:42

## 2019-07-18 RX ADMIN — AMLODIPINE BESYLATE 5 MILLIGRAM(S): 2.5 TABLET ORAL at 08:46

## 2019-07-18 RX ADMIN — Medication 25 MILLIGRAM(S): at 08:49

## 2019-07-18 RX ADMIN — LOSARTAN POTASSIUM 100 MILLIGRAM(S): 100 TABLET, FILM COATED ORAL at 08:46

## 2019-07-18 RX ADMIN — Medication 2 MILLIGRAM(S): at 20:34

## 2019-07-18 RX ADMIN — Medication 50 MILLIGRAM(S): at 11:42

## 2019-07-18 RX ADMIN — QUETIAPINE FUMARATE 300 MILLIGRAM(S): 200 TABLET, FILM COATED ORAL at 20:35

## 2019-07-18 RX ADMIN — AMLODIPINE BESYLATE 10 MILLIGRAM(S): 2.5 TABLET ORAL at 20:34

## 2019-07-18 RX ADMIN — Medication 25 MILLIGRAM(S): at 20:33

## 2019-07-18 RX ADMIN — DIVALPROEX SODIUM 500 MILLIGRAM(S): 500 TABLET, DELAYED RELEASE ORAL at 20:34

## 2019-07-18 RX ADMIN — Medication 2 MILLIGRAM(S): at 11:43

## 2019-07-18 RX ADMIN — Medication 1 PATCH: at 11:43

## 2019-07-18 RX ADMIN — Medication 0.5 MILLIGRAM(S): at 20:34

## 2019-07-18 RX ADMIN — HALOPERIDOL DECANOATE 10 MILLIGRAM(S): 100 INJECTION INTRAMUSCULAR at 20:34

## 2019-07-18 RX ADMIN — Medication 15 MILLIGRAM(S): at 20:34

## 2019-07-18 NOTE — PROGRESS NOTE BEHAVIORAL HEALTH - NSBHFUPADDHPIFT_PSY_A_CORE
Patient has been a resident there for 6 years. He has been compliant with medications. Patient has had chronic suicidal ideation, however having a plan, "use roommates scissors to cut wrist" was new. Patient is normally not a behavioral issue and is able to return once stable. No more information on history.     Left message with patient's brother, Ulises, [phone].

## 2019-07-18 NOTE — CHART NOTE - NSCHARTNOTEFT_GEN_A_CORE
Patient is a 58 year old male, resident of [facility] admitted to unit emergency involuntary status due to suicidal plan and intent . Patient reports low mood, isolations, feelings of hopelessness and helplessness. Patient has had a strong desire to slit his wrist and end his life. Patient has multiple prior IPP admissions with similar presentation. Patient admitted to unit 939 for treatment safety and observation. Please see social work psychosocial assessment for more information.

## 2019-07-18 NOTE — CONSULT NOTE ADULT - SUBJECTIVE AND OBJECTIVE BOX
SAM REA  58y  Male      Patient is a 58y old  Male who presents with a chief complaint of 58 YEARS OLD MALE C/O SUICIDAL THOUGHTS . (2019 21:59)    HPI:  58 YEARS OLD MALE WITH PMH OF DEPRESSION, HTN , HYPERLIPIDEMIA AND SCHIZOPHRENIA COME TO ER C/O SUICIDAL THOUGHTS . (2019 21:59)    INTERVAL HPI/OVERNIGHT EVENTS:  HEALTH ISSUES - PROBLEM Dx:  Suicidal thoughts: Suicidal thoughts  Depression: Depression  Major depression with psychotic features        PAST MEDICAL & SURGICAL HISTORY:  Schizophrenia  Hyperlipemia  Depression  HTN (hypertension)  No significant past surgical history    FAMILY HISTORY:    amLODIPine   Tablet 5 milliGRAM(s) Oral daily  hydrOXYzine hydrochloride 25 milliGRAM(s) Oral every 6 hours PRN  losartan 100 milliGRAM(s) Oral daily      REVIEW OF SYSTEMS:  CONSTITUTIONAL: No fever, weight loss, or fatigue  EYES: No eye pain, visual disturbances, or discharge  ENMT:  No difficulty hearing, tinnitus, vertigo; No sinus or throat pain  NECK: No pain or stiffness  BREASTS: No pain, masses, or nipple discharge  RESPIRATORY: No cough, wheezing, chills or hemoptysis; No shortness of breath  CARDIOVASCULAR: No chest pain, palpitations, dizziness, or leg swelling  GASTROINTESTINAL: No abdominal or epigastric pain. No nausea, vomiting, or hematemesis; No diarrhea or constipation. No melena or hematochezia.  GENITOURINARY: No dysuria, frequency, hematuria, or incontinence  NEUROLOGICAL: No headaches, memory loss, loss of strength, numbness, or tremors  SKIN: No itching, burning, rashes, or lesions   LYMPH NODES: No enlarged glands  ENDOCRINE: No heat or cold intolerance; No hair loss  MUSCULOSKELETAL: No joint pain or swelling; No muscle, back, or extremity pain  PSYCHIATRIC: as per hpi and previous psych history  HEME/LYMPH: No easy bruising, or bleeding gums  ALLERY AND IMMUNOLOGIC: No hives or eczema    T(C): 37.1 (19 @ 06:04), Max: 37.1 (19 @ 15:52)  HR: 75 (19 @ 06:52) (75 - 84)  BP: 145/100 (19 @ 06:52) (145/100 - 173/93)  RR: 19 (19 @ 06:04) (18 - 19)  SpO2: 95% (07-17-19 @ 15:52) (95% - 95%)  Wt(kg): --Vital Signs Last 24 Hrs  T(C): 37.1 (2019 06:04), Max: 37.1 (2019 15:52)  T(F): 98.7 (2019 06:04), Max: 98.8 (2019 15:52)  HR: 75 (2019 06:52) (75 - 84)  BP: 145/100 (2019 06:52) (145/100 - 173/93)  BP(mean): --  RR: 19 (2019 06:04) (18 - 19)  SpO2: 95% (2019 15:52) (95% - 95%)    PHYSICAL EXAM:  GENERAL: NAD,well-developed  HEAD:  Atraumatic, Normocephalic  EYES: EOMI, PERRLA, conjunctiva and sclera clear  ENMT: No tonsillar erythema, exudates, or enlargement; Moist mucous membranes, Good dentition, No lesions  NECK: Supple, No JVD, Normal thyroid  CHEST/LUNG: Clear bs bilaterally; No rales, rhonchi, wheezing  HEART: Regular rate and rhythm; No murmurs, rubs, or gallops  ABDOMEN: Soft, Nontender, Nondistended; Bowel sounds present  EXTREMITIES:  2+ Peripheral Pulses, No clubbing, cyanosis, or edema  LYMPH: No lymphadenopathy noted  SKIN: No rashes or lesions  Neuro: alert  no focal deficits    Consultant(s) Notes Reviewed:  [x ] YES  [ ] NO  Care Discussed with Consultants/Other Providers [ x] YES  [ ] NO    LABS:                        13.3   6.80  )-----------( 162      ( 2019 19:30 )             37.9     07-17    138  |  101  |  11  ----------------------------<  112<H>  3.5   |  24  |  0.9    Ca    9.4      2019 19:30    TPro  6.4  /  Alb  4.0  /  TBili  0.2  /  DBili  x   /  AST  12  /  ALT  8   /  AlkPhos  76  07-17      Urinalysis Basic - ( 2019 19:30 )    Color: Yellow / Appearance: Clear / S.015 / pH: x  Gluc: x / Ketone: Negative  / Bili: Negative / Urobili: 0.2 mg/dL   Blood: x / Protein: Negative mg/dL / Nitrite: Negative   Leuk Esterase: Negative / RBC: x / WBC x   Sq Epi: x / Non Sq Epi: x / Bacteria: x      CAPILLARY BLOOD GLUCOSE            Urinalysis Basic - ( 2019 19:30 )    Color: Yellow / Appearance: Clear / S.015 / pH: x  Gluc: x / Ketone: Negative  / Bili: Negative / Urobili: 0.2 mg/dL   Blood: x / Protein: Negative mg/dL / Nitrite: Negative   Leuk Esterase: Negative / RBC: x / WBC x   Sq Epi: x / Non Sq Epi: x / Bacteria: x        RADIOLOGY & ADDITIONAL TESTS:    Imaging Personally Reviewed:  [ ] YES  [ ] NO

## 2019-07-18 NOTE — PROGRESS NOTE BEHAVIORAL HEALTH - NSBHFUPINTERVALHXFT_PSY_A_CORE
Patient seen and examined during morning report, chart reviewed. Patient endorses that he is here because his "medications" made him "suicidal". He is unsure what he has been taking at his residence. He endorses suicidal ideation, unable to identify trigger. Patient says that he has been feeling like this for the past three days. He informs that he would take his roommates "scissors" or "punch the window". Patient also acknowledges high levels of anxiety, without identifiable source. Patient informs he has been sleeping and eating well. When asked about AH, he replies "don't we all hear some voices". He denies symptoms of agustín or psychosis. Patient seen and examined during morning report, chart reviewed. Patient fairly poor historian. Patient endorses that he is here because his "medications" made him "suicidal". He is unsure what he has been taking at his residence. He endorses suicidal ideation, unable to identify trigger. Patient says that he has been feeling like this for the past three days. He informs that he would take his roommates "scissors" or "punch the window". When asked what prevents him from doing so, patient acknowledges " I guess GOD above me". Patient also acknowledges high levels of anxiety, without identifiable source. Patient informs he has been sleeping and eating well. When asked about AH, he replies "don't we all hear some voices". Though he has active ideation, he agrees that he can talk to staff if the thoughts become intrusive. He denies symptoms of agustín or psychosis.

## 2019-07-19 LAB
HCV AB S/CO SERPL IA: 0.13 S/CO — SIGNIFICANT CHANGE UP (ref 0–0.99)
HCV AB SERPL-IMP: SIGNIFICANT CHANGE UP
VALPROATE SERPL-MCNC: 48 UG/ML — LOW (ref 50–100)

## 2019-07-19 PROCEDURE — 99231 SBSQ HOSP IP/OBS SF/LOW 25: CPT

## 2019-07-19 RX ADMIN — LOSARTAN POTASSIUM 100 MILLIGRAM(S): 100 TABLET, FILM COATED ORAL at 08:37

## 2019-07-19 RX ADMIN — DIVALPROEX SODIUM 500 MILLIGRAM(S): 500 TABLET, DELAYED RELEASE ORAL at 08:38

## 2019-07-19 RX ADMIN — HALOPERIDOL DECANOATE 10 MILLIGRAM(S): 100 INJECTION INTRAMUSCULAR at 20:13

## 2019-07-19 RX ADMIN — Medication 1 PATCH: at 08:36

## 2019-07-19 RX ADMIN — Medication 15 MILLIGRAM(S): at 08:38

## 2019-07-19 RX ADMIN — AMLODIPINE BESYLATE 10 MILLIGRAM(S): 2.5 TABLET ORAL at 20:13

## 2019-07-19 RX ADMIN — Medication 0.5 MILLIGRAM(S): at 20:14

## 2019-07-19 RX ADMIN — Medication 1 PATCH: at 08:43

## 2019-07-19 RX ADMIN — QUETIAPINE FUMARATE 300 MILLIGRAM(S): 200 TABLET, FILM COATED ORAL at 20:14

## 2019-07-19 RX ADMIN — Medication 1 PATCH: at 08:41

## 2019-07-19 RX ADMIN — HALOPERIDOL DECANOATE 10 MILLIGRAM(S): 100 INJECTION INTRAMUSCULAR at 08:38

## 2019-07-19 RX ADMIN — QUETIAPINE FUMARATE 100 MILLIGRAM(S): 200 TABLET, FILM COATED ORAL at 08:37

## 2019-07-19 RX ADMIN — Medication 0.5 MILLIGRAM(S): at 08:38

## 2019-07-19 RX ADMIN — Medication 2 MILLIGRAM(S): at 20:15

## 2019-07-19 RX ADMIN — Medication 2 MILLIGRAM(S): at 08:41

## 2019-07-19 RX ADMIN — Medication 15 MILLIGRAM(S): at 20:13

## 2019-07-19 RX ADMIN — DIVALPROEX SODIUM 500 MILLIGRAM(S): 500 TABLET, DELAYED RELEASE ORAL at 20:13

## 2019-07-19 NOTE — PROGRESS NOTE BEHAVIORAL HEALTH - RISK ASSESSMENT
Patient at moderate/severe risk for self harm given acute mood episode, active suicidal ideation and plan, and history of previous suicidality, which are not fully mitigated by protective factors that include structured residential facility, medication compliance, sobriety, and spirituality. Subsequently, patient requires continue treatment in inpatient psychiatric facility until further stabilized.
Patient at moderate/severe risk for self harm given acute mood episode, active suicidal ideation and plan, and history of previous suicidality, which are not fully mitigated by protective factors that include structured residential facility, medication compliance, sobriety, and spirituality. Subsequently, patient requires continue treatment in inpatient psychiatric facility until further stabilized.

## 2019-07-19 NOTE — PROGRESS NOTE BEHAVIORAL HEALTH - NSBHFUPINTERVALHXFT_PSY_A_CORE
Patient seen and examined during morning rounds, chart reviewed. Patient continues to report suicidal ideation but he says they are "less today". He acknowledges that he can talk to staff if the thoughts become intrusive. He denies symptoms of agustín or psychosis.

## 2019-07-19 NOTE — CHART NOTE - NSCHARTNOTEFT_GEN_A_CORE
Mr. Simon remains on the unit for continued treatment, safety and observation. He was seen by  and treatment team during morning rounds. He declined team meeting. He remains isolative to his room and is encouraged by staff to be more visible on the unit. He is compliant with medication. Mr. Simon is less suicidal and denies homicidal ideation. ADL’s are poor as patient has been incontinent on the unit and nursing staff continues to encourage showering. He reports eating well.      will continue to meet with Mr. Simon one on one and with treatment team daily. Upon discharge, he will return to his residence at [facility]. Patient is not yet psychiatrically stable for discharge.

## 2019-07-20 PROCEDURE — 99231 SBSQ HOSP IP/OBS SF/LOW 25: CPT

## 2019-07-20 RX ORDER — ACETAMINOPHEN 500 MG
650 TABLET ORAL EVERY 6 HOURS
Refills: 0 | Status: DISCONTINUED | OUTPATIENT
Start: 2019-07-20 | End: 2019-08-05

## 2019-07-20 RX ORDER — IBUPROFEN 200 MG
400 TABLET ORAL EVERY 8 HOURS
Refills: 0 | Status: DISCONTINUED | OUTPATIENT
Start: 2019-07-20 | End: 2019-08-05

## 2019-07-20 RX ADMIN — DIVALPROEX SODIUM 500 MILLIGRAM(S): 500 TABLET, DELAYED RELEASE ORAL at 08:09

## 2019-07-20 RX ADMIN — Medication 1 PATCH: at 08:10

## 2019-07-20 RX ADMIN — Medication 400 MILLIGRAM(S): at 14:37

## 2019-07-20 RX ADMIN — HALOPERIDOL DECANOATE 10 MILLIGRAM(S): 100 INJECTION INTRAMUSCULAR at 08:09

## 2019-07-20 RX ADMIN — Medication 0.5 MILLIGRAM(S): at 08:09

## 2019-07-20 RX ADMIN — Medication 2 MILLIGRAM(S): at 08:09

## 2019-07-20 RX ADMIN — Medication 0.5 MILLIGRAM(S): at 20:10

## 2019-07-20 RX ADMIN — QUETIAPINE FUMARATE 100 MILLIGRAM(S): 200 TABLET, FILM COATED ORAL at 08:11

## 2019-07-20 RX ADMIN — Medication 15 MILLIGRAM(S): at 08:09

## 2019-07-20 RX ADMIN — Medication 1 PATCH: at 19:38

## 2019-07-20 RX ADMIN — DIVALPROEX SODIUM 500 MILLIGRAM(S): 500 TABLET, DELAYED RELEASE ORAL at 20:10

## 2019-07-20 RX ADMIN — QUETIAPINE FUMARATE 300 MILLIGRAM(S): 200 TABLET, FILM COATED ORAL at 20:10

## 2019-07-20 RX ADMIN — Medication 1 PATCH: at 08:09

## 2019-07-20 RX ADMIN — Medication 2 MILLIGRAM(S): at 20:11

## 2019-07-20 RX ADMIN — Medication 15 MILLIGRAM(S): at 20:10

## 2019-07-20 RX ADMIN — AMLODIPINE BESYLATE 10 MILLIGRAM(S): 2.5 TABLET ORAL at 20:10

## 2019-07-20 RX ADMIN — HALOPERIDOL DECANOATE 10 MILLIGRAM(S): 100 INJECTION INTRAMUSCULAR at 20:10

## 2019-07-20 RX ADMIN — Medication 400 MILLIGRAM(S): at 15:07

## 2019-07-20 RX ADMIN — LOSARTAN POTASSIUM 100 MILLIGRAM(S): 100 TABLET, FILM COATED ORAL at 08:16

## 2019-07-20 NOTE — PROGRESS NOTE BEHAVIORAL HEALTH - NSBHFUPINTERVALHXFT_PSY_A_CORE
Pt was seen, and evaluated, and chart was reviewed. No acute events overnight.  Pt wetted his bed. He claims he cant get the bathroom in time . An alarm jonah button will be supplied so pt can jonah for help.  Patient is alert, Ox3,  calm, cooperative, compliant with treatment. Patient is in good behavioral control.  ***Pt denies and presents no evidence for suicidal or homicidal plans or intentions.  Pt is not acutely psychotic and denies A/V H.  Continues current medication regimen.

## 2019-07-21 RX ADMIN — Medication 15 MILLIGRAM(S): at 20:19

## 2019-07-21 RX ADMIN — Medication 1 PATCH: at 07:38

## 2019-07-21 RX ADMIN — QUETIAPINE FUMARATE 300 MILLIGRAM(S): 200 TABLET, FILM COATED ORAL at 20:19

## 2019-07-21 RX ADMIN — HALOPERIDOL DECANOATE 10 MILLIGRAM(S): 100 INJECTION INTRAMUSCULAR at 08:36

## 2019-07-21 RX ADMIN — DIVALPROEX SODIUM 500 MILLIGRAM(S): 500 TABLET, DELAYED RELEASE ORAL at 08:36

## 2019-07-21 RX ADMIN — AMLODIPINE BESYLATE 10 MILLIGRAM(S): 2.5 TABLET ORAL at 20:19

## 2019-07-21 RX ADMIN — QUETIAPINE FUMARATE 100 MILLIGRAM(S): 200 TABLET, FILM COATED ORAL at 08:39

## 2019-07-21 RX ADMIN — Medication 2 MILLIGRAM(S): at 20:21

## 2019-07-21 RX ADMIN — Medication 1 PATCH: at 08:35

## 2019-07-21 RX ADMIN — Medication 2 MILLIGRAM(S): at 08:36

## 2019-07-21 RX ADMIN — DIVALPROEX SODIUM 500 MILLIGRAM(S): 500 TABLET, DELAYED RELEASE ORAL at 20:19

## 2019-07-21 RX ADMIN — LOSARTAN POTASSIUM 100 MILLIGRAM(S): 100 TABLET, FILM COATED ORAL at 08:45

## 2019-07-21 RX ADMIN — HALOPERIDOL DECANOATE 10 MILLIGRAM(S): 100 INJECTION INTRAMUSCULAR at 20:19

## 2019-07-21 RX ADMIN — Medication 1 PATCH: at 07:39

## 2019-07-21 RX ADMIN — Medication 0.5 MILLIGRAM(S): at 20:23

## 2019-07-21 RX ADMIN — Medication 15 MILLIGRAM(S): at 08:36

## 2019-07-21 RX ADMIN — Medication 0.5 MILLIGRAM(S): at 08:36

## 2019-07-22 PROCEDURE — 99231 SBSQ HOSP IP/OBS SF/LOW 25: CPT

## 2019-07-22 RX ADMIN — Medication 0.5 MILLIGRAM(S): at 08:23

## 2019-07-22 RX ADMIN — HALOPERIDOL DECANOATE 10 MILLIGRAM(S): 100 INJECTION INTRAMUSCULAR at 08:23

## 2019-07-22 RX ADMIN — QUETIAPINE FUMARATE 100 MILLIGRAM(S): 200 TABLET, FILM COATED ORAL at 08:23

## 2019-07-22 RX ADMIN — QUETIAPINE FUMARATE 300 MILLIGRAM(S): 200 TABLET, FILM COATED ORAL at 20:08

## 2019-07-22 RX ADMIN — Medication 1 PATCH: at 08:24

## 2019-07-22 RX ADMIN — DIVALPROEX SODIUM 500 MILLIGRAM(S): 500 TABLET, DELAYED RELEASE ORAL at 20:08

## 2019-07-22 RX ADMIN — Medication 2 MILLIGRAM(S): at 20:09

## 2019-07-22 RX ADMIN — DIVALPROEX SODIUM 500 MILLIGRAM(S): 500 TABLET, DELAYED RELEASE ORAL at 08:23

## 2019-07-22 RX ADMIN — Medication 15 MILLIGRAM(S): at 20:08

## 2019-07-22 RX ADMIN — Medication 2 MILLIGRAM(S): at 08:24

## 2019-07-22 RX ADMIN — Medication 15 MILLIGRAM(S): at 08:23

## 2019-07-22 RX ADMIN — HALOPERIDOL DECANOATE 10 MILLIGRAM(S): 100 INJECTION INTRAMUSCULAR at 20:08

## 2019-07-22 RX ADMIN — LOSARTAN POTASSIUM 100 MILLIGRAM(S): 100 TABLET, FILM COATED ORAL at 08:24

## 2019-07-22 RX ADMIN — Medication 0.5 MILLIGRAM(S): at 20:08

## 2019-07-22 RX ADMIN — AMLODIPINE BESYLATE 10 MILLIGRAM(S): 2.5 TABLET ORAL at 20:08

## 2019-07-22 NOTE — PROGRESS NOTE ADULT - PROBLEM SELECTOR PLAN 2
bp noted still high sdie  luis f continue to montior with current psych tx   no change in meds for now

## 2019-07-22 NOTE — PROGRESS NOTE BEHAVIORAL HEALTH - NSBHFUPINTERVALHXFT_PSY_A_CORE
Pt was seen and evaluated during morning rounds, chart was reviewed. No acute events overnight. Patient reports feeling weak and having trouble getting out of bed. Otherwise no complaints. Eating and sleeping well. He denies suicidal ideations, intent or plan. Pt is not acutely psychotic and denies A/V H.  Continues current medication regimen. Pt was seen and evaluated during morning rounds, chart was reviewed. No acute events overnight. Patient reports feeling weak and having trouble getting out of bed, but ambulates without assist. Otherwise no complaints. Eating and sleeping well. He denies suicidal ideations, intent or plan. Pt is not acutely psychotic and denies A/V H.  Continues current medication regimen.

## 2019-07-22 NOTE — PROGRESS NOTE BEHAVIORAL HEALTH - NSBHCHARTREVIEWVS_PSY_A_CORE FT
Vital Signs Last 24 Hrs  T(C): 37 (22 Jul 2019 06:01), Max: 37 (22 Jul 2019 06:01)  T(F): 98.6 (22 Jul 2019 06:01), Max: 98.6 (22 Jul 2019 06:01)  HR: 89 (22 Jul 2019 09:32) (77 - 91)  BP: 147/82 (22 Jul 2019 09:32) (137/77 - 164/94)  BP(mean): --  RR: 18 (22 Jul 2019 09:32) (18 - 20)  SpO2: --
Vital Signs Last 24 Hrs  T(C): 36.3 (18 Jul 2019 09:07), Max: 37.1 (17 Jul 2019 15:52)  T(F): 97.4 (18 Jul 2019 09:07), Max: 98.8 (17 Jul 2019 15:52)  HR: 81 (18 Jul 2019 09:07) (75 - 84)  BP: 180/87 (18 Jul 2019 09:07) (145/100 - 180/87)  BP(mean): --  RR: 18 (18 Jul 2019 09:07) (18 - 19)  SpO2: 95% (17 Jul 2019 15:52) (95% - 95%)
T(C): 36.1 (07-20-19 @ 18:50), Max: 36.1 (07-20-19 @ 18:50)  HR: 96 (07-20-19 @ 18:50) (89 - 96)  BP: 126/76 (07-20-19 @ 18:50) (122/82 - 135/91)  RR: 18 (07-20-19 @ 18:50) (16 - 18)  SpO2: --

## 2019-07-23 PROCEDURE — 99231 SBSQ HOSP IP/OBS SF/LOW 25: CPT

## 2019-07-23 RX ADMIN — DIVALPROEX SODIUM 500 MILLIGRAM(S): 500 TABLET, DELAYED RELEASE ORAL at 20:49

## 2019-07-23 RX ADMIN — LOSARTAN POTASSIUM 100 MILLIGRAM(S): 100 TABLET, FILM COATED ORAL at 09:32

## 2019-07-23 RX ADMIN — Medication 15 MILLIGRAM(S): at 09:32

## 2019-07-23 RX ADMIN — HALOPERIDOL DECANOATE 10 MILLIGRAM(S): 100 INJECTION INTRAMUSCULAR at 09:32

## 2019-07-23 RX ADMIN — Medication 1 PATCH: at 09:33

## 2019-07-23 RX ADMIN — Medication 1 PATCH: at 07:59

## 2019-07-23 RX ADMIN — HALOPERIDOL DECANOATE 10 MILLIGRAM(S): 100 INJECTION INTRAMUSCULAR at 20:49

## 2019-07-23 RX ADMIN — AMLODIPINE BESYLATE 10 MILLIGRAM(S): 2.5 TABLET ORAL at 20:49

## 2019-07-23 RX ADMIN — DIVALPROEX SODIUM 500 MILLIGRAM(S): 500 TABLET, DELAYED RELEASE ORAL at 09:32

## 2019-07-23 RX ADMIN — QUETIAPINE FUMARATE 100 MILLIGRAM(S): 200 TABLET, FILM COATED ORAL at 09:32

## 2019-07-23 RX ADMIN — Medication 0.5 MILLIGRAM(S): at 09:32

## 2019-07-23 RX ADMIN — QUETIAPINE FUMARATE 300 MILLIGRAM(S): 200 TABLET, FILM COATED ORAL at 20:49

## 2019-07-23 RX ADMIN — Medication 2 MILLIGRAM(S): at 09:32

## 2019-07-23 RX ADMIN — Medication 2 MILLIGRAM(S): at 20:51

## 2019-07-23 RX ADMIN — Medication 15 MILLIGRAM(S): at 20:49

## 2019-07-23 RX ADMIN — Medication 0.5 MILLIGRAM(S): at 20:49

## 2019-07-23 NOTE — PHYSICAL THERAPY INITIAL EVALUATION ADULT - IMPAIRMENTS FOUND, PT EVAL
ergonomics and body mechanics/gait, locomotion, and balance/aerobic capacity/endurance/muscle strength/posture

## 2019-07-23 NOTE — PROGRESS NOTE BEHAVIORAL HEALTH - NSBHFUPINTERVALHXFT_PSY_A_CORE
Pt was seen and evaluated during morning rounds, chart was reviewed. No acute events overnight. Patient remains isolative to room and to bed. When asked about when he would be ready, he reports "I don't want to go back and start smoking cigarettes". Otherwise no complaints. Eating and sleeping well. He denies suicidal ideations, intent or plan. Pt is not acutely psychotic and denies A/V H.  Continues current medication regimen.

## 2019-07-23 NOTE — PHYSICAL THERAPY INITIAL EVALUATION ADULT - TRANSFER SAFETY CONCERNS NOTED: SIT/STAND, REHAB EVAL
decreased weight-shifting ability/decreased balance during turns/losing balance/decreased step length/decreased safety awareness

## 2019-07-23 NOTE — PHYSICAL THERAPY INITIAL EVALUATION ADULT - GAIT DEVIATIONS NOTED, PT EVAL
decreased ina/decreased weight-shifting ability/forward flexed posture ; unsteady gait/decreased step length/increased time in double stance

## 2019-07-23 NOTE — PHYSICAL THERAPY INITIAL EVALUATION ADULT - GENERAL OBSERVATIONS, REHAB EVAL
13:35 - 14:00. Chart reviewed. Patient available to be seen for physical therapy, confirmed with nurse. Patient encountered already seated at edge of bed. Denies pain at rest, agreeable for PT evaluation.

## 2019-07-24 DIAGNOSIS — F25.0 SCHIZOAFFECTIVE DISORDER, BIPOLAR TYPE: ICD-10-CM

## 2019-07-24 PROCEDURE — 99231 SBSQ HOSP IP/OBS SF/LOW 25: CPT

## 2019-07-24 RX ADMIN — LOSARTAN POTASSIUM 100 MILLIGRAM(S): 100 TABLET, FILM COATED ORAL at 08:11

## 2019-07-24 RX ADMIN — Medication 650 MILLIGRAM(S): at 15:00

## 2019-07-24 RX ADMIN — DIVALPROEX SODIUM 500 MILLIGRAM(S): 500 TABLET, DELAYED RELEASE ORAL at 20:42

## 2019-07-24 RX ADMIN — Medication 1 PATCH: at 19:21

## 2019-07-24 RX ADMIN — Medication 15 MILLIGRAM(S): at 08:10

## 2019-07-24 RX ADMIN — DIVALPROEX SODIUM 500 MILLIGRAM(S): 500 TABLET, DELAYED RELEASE ORAL at 08:10

## 2019-07-24 RX ADMIN — QUETIAPINE FUMARATE 300 MILLIGRAM(S): 200 TABLET, FILM COATED ORAL at 20:42

## 2019-07-24 RX ADMIN — HALOPERIDOL DECANOATE 10 MILLIGRAM(S): 100 INJECTION INTRAMUSCULAR at 08:10

## 2019-07-24 RX ADMIN — Medication 15 MILLIGRAM(S): at 20:42

## 2019-07-24 RX ADMIN — Medication 1 PATCH: at 08:11

## 2019-07-24 RX ADMIN — AMLODIPINE BESYLATE 10 MILLIGRAM(S): 2.5 TABLET ORAL at 20:42

## 2019-07-24 RX ADMIN — Medication 2 MILLIGRAM(S): at 20:43

## 2019-07-24 RX ADMIN — Medication 2 MILLIGRAM(S): at 08:13

## 2019-07-24 RX ADMIN — Medication 0.5 MILLIGRAM(S): at 20:42

## 2019-07-24 RX ADMIN — Medication 1 PATCH: at 18:58

## 2019-07-24 RX ADMIN — QUETIAPINE FUMARATE 100 MILLIGRAM(S): 200 TABLET, FILM COATED ORAL at 08:10

## 2019-07-24 RX ADMIN — HALOPERIDOL DECANOATE 10 MILLIGRAM(S): 100 INJECTION INTRAMUSCULAR at 20:42

## 2019-07-24 RX ADMIN — Medication 0.5 MILLIGRAM(S): at 08:10

## 2019-07-24 NOTE — PROGRESS NOTE BEHAVIORAL HEALTH - NSBHFUPINTERVALCCFT_PSY_A_CORE
Pt is isolative and without acute c/o. He states he feels better and doesn't want to harm himself, but isnt ready to go back to his residence

## 2019-07-24 NOTE — CHART NOTE - NSCHARTNOTEFT_GEN_A_CORE
Pt. continues to remain isolative to his room.  There is no apparent psychosis present.  Pt denies any suicidal or homicidal ideations or any A/V hallucinations at this time.  Pt. states he is not ready to return to [facility] in part because he believes he will resume smoking cigarettes.  Once pt. is ready for discharge, he will return to his residence at [facility].  Pt. is not yet ready for discharge at this time.

## 2019-07-25 PROCEDURE — 99231 SBSQ HOSP IP/OBS SF/LOW 25: CPT

## 2019-07-25 RX ADMIN — Medication 1 PATCH: at 08:23

## 2019-07-25 RX ADMIN — Medication 2 MILLIGRAM(S): at 08:21

## 2019-07-25 RX ADMIN — QUETIAPINE FUMARATE 300 MILLIGRAM(S): 200 TABLET, FILM COATED ORAL at 20:33

## 2019-07-25 RX ADMIN — HALOPERIDOL DECANOATE 10 MILLIGRAM(S): 100 INJECTION INTRAMUSCULAR at 08:22

## 2019-07-25 RX ADMIN — Medication 1 PATCH: at 19:12

## 2019-07-25 RX ADMIN — AMLODIPINE BESYLATE 10 MILLIGRAM(S): 2.5 TABLET ORAL at 20:33

## 2019-07-25 RX ADMIN — Medication 15 MILLIGRAM(S): at 20:33

## 2019-07-25 RX ADMIN — HALOPERIDOL DECANOATE 10 MILLIGRAM(S): 100 INJECTION INTRAMUSCULAR at 20:33

## 2019-07-25 RX ADMIN — DIVALPROEX SODIUM 500 MILLIGRAM(S): 500 TABLET, DELAYED RELEASE ORAL at 08:22

## 2019-07-25 RX ADMIN — Medication 1 PATCH: at 08:24

## 2019-07-25 RX ADMIN — Medication 15 MILLIGRAM(S): at 08:22

## 2019-07-25 RX ADMIN — Medication 0.5 MILLIGRAM(S): at 20:33

## 2019-07-25 RX ADMIN — Medication 1 PATCH: at 06:06

## 2019-07-25 RX ADMIN — DIVALPROEX SODIUM 500 MILLIGRAM(S): 500 TABLET, DELAYED RELEASE ORAL at 20:33

## 2019-07-25 RX ADMIN — Medication 0.5 MILLIGRAM(S): at 08:21

## 2019-07-25 RX ADMIN — QUETIAPINE FUMARATE 100 MILLIGRAM(S): 200 TABLET, FILM COATED ORAL at 08:22

## 2019-07-25 RX ADMIN — Medication 2 MILLIGRAM(S): at 20:34

## 2019-07-25 RX ADMIN — LOSARTAN POTASSIUM 100 MILLIGRAM(S): 100 TABLET, FILM COATED ORAL at 08:24

## 2019-07-25 NOTE — PROGRESS NOTE BEHAVIORAL HEALTH - NSBHFUPINTERVALHXFT_PSY_A_CORE
Pt was seen and evaluated during morning rounds, chart was reviewed. No acute events overnight. Patient remains isolative to room and to bed. Continues to endorse that he is not ready to go back because he "doesn't want to smoke tobacco there". Otherwise no complaints. Eating and sleeping well. He denies suicidal ideations, intent or plan. Pt is not acutely psychotic and denies A/V H.  Continues current medication regimen.

## 2019-07-26 PROCEDURE — 99231 SBSQ HOSP IP/OBS SF/LOW 25: CPT

## 2019-07-26 RX ORDER — DIVALPROEX SODIUM 500 MG/1
500 TABLET, DELAYED RELEASE ORAL DAILY
Refills: 0 | Status: DISCONTINUED | OUTPATIENT
Start: 2019-07-26 | End: 2019-08-05

## 2019-07-26 RX ORDER — DIVALPROEX SODIUM 500 MG/1
750 TABLET, DELAYED RELEASE ORAL EVERY 12 HOURS
Refills: 0 | Status: DISCONTINUED | OUTPATIENT
Start: 2019-07-26 | End: 2019-07-26

## 2019-07-26 RX ORDER — DIVALPROEX SODIUM 500 MG/1
500 TABLET, DELAYED RELEASE ORAL AT BEDTIME
Refills: 0 | Status: DISCONTINUED | OUTPATIENT
Start: 2019-07-26 | End: 2019-08-05

## 2019-07-26 RX ADMIN — Medication 1 PATCH: at 08:20

## 2019-07-26 RX ADMIN — Medication 0.5 MILLIGRAM(S): at 08:21

## 2019-07-26 RX ADMIN — HALOPERIDOL DECANOATE 10 MILLIGRAM(S): 100 INJECTION INTRAMUSCULAR at 08:21

## 2019-07-26 RX ADMIN — Medication 15 MILLIGRAM(S): at 08:21

## 2019-07-26 RX ADMIN — Medication 15 MILLIGRAM(S): at 20:02

## 2019-07-26 RX ADMIN — AMLODIPINE BESYLATE 10 MILLIGRAM(S): 2.5 TABLET ORAL at 20:02

## 2019-07-26 RX ADMIN — QUETIAPINE FUMARATE 300 MILLIGRAM(S): 200 TABLET, FILM COATED ORAL at 20:03

## 2019-07-26 RX ADMIN — Medication 1 PATCH: at 08:21

## 2019-07-26 RX ADMIN — QUETIAPINE FUMARATE 100 MILLIGRAM(S): 200 TABLET, FILM COATED ORAL at 08:21

## 2019-07-26 RX ADMIN — HALOPERIDOL DECANOATE 10 MILLIGRAM(S): 100 INJECTION INTRAMUSCULAR at 20:03

## 2019-07-26 RX ADMIN — Medication 0.5 MILLIGRAM(S): at 20:02

## 2019-07-26 RX ADMIN — LOSARTAN POTASSIUM 100 MILLIGRAM(S): 100 TABLET, FILM COATED ORAL at 08:21

## 2019-07-26 RX ADMIN — DIVALPROEX SODIUM 500 MILLIGRAM(S): 500 TABLET, DELAYED RELEASE ORAL at 20:03

## 2019-07-26 RX ADMIN — Medication 1 MILLIGRAM(S): at 20:03

## 2019-07-26 RX ADMIN — DIVALPROEX SODIUM 500 MILLIGRAM(S): 500 TABLET, DELAYED RELEASE ORAL at 08:21

## 2019-07-26 NOTE — PROGRESS NOTE BEHAVIORAL HEALTH - NSBHFUPINTERVALHXFT_PSY_A_CORE
Pt was seen and evaluated during morning rounds, chart was reviewed. No acute issues. Patient refused to come to team meeting. Remains isolative to room and to bed. Continues to endorse that he is not ready to go back to his residence, now citing that he needs to "be examined" physically. Otherwise no complaints. Eating and sleeping well. He denies suicidal ideations, intent or plan. Pt is not acutely psychotic and denies A/V H.  Continues current medication regimen.

## 2019-07-26 NOTE — PROGRESS NOTE BEHAVIORAL HEALTH - PRN MEDS
ibuprofen  Tablet.   400 milliGRAM(s) Oral (07-20-19 @ 14:37)

## 2019-07-26 NOTE — CHART NOTE - NSCHARTNOTEFT_GEN_A_CORE
Pt continues to remain isolative to his room.  In addition, pt. has been struggling with incontinence.  He refused to participate in this morning's treatment team meeting.  Pt. denies any suicidal or homicidal ideations or any A/V hallucinations.  Upon discharge, pt. will return to his residence at [facility] where he also receives mental health services.  Pt. is not yet stable for discharge at this time.

## 2019-07-26 NOTE — PROGRESS NOTE BEHAVIORAL HEALTH - NSBHCHARTREVIEWLAB_PSY_A_CORE FT
Comprehensive Metabolic Panel (07.17.19 @ 19:30)    Sodium, Serum: 138 mmol/L    Potassium, Serum: 3.5 mmol/L    Chloride, Serum: 101 mmol/L    Carbon Dioxide, Serum: 24 mmol/L    Anion Gap, Serum: 13 mmol/L    Blood Urea Nitrogen, Serum: 11 mg/dL    Creatinine, Serum: 0.9 mg/dL    Glucose, Serum: 112 mg/dL    Calcium, Total Serum: 9.4 mg/dL    Protein Total, Serum: 6.4 g/dL    Albumin, Serum: 4.0 g/dL    Bilirubin Total, Serum: 0.2 mg/dL    Alkaline Phosphatase, Serum: 76 U/L    Aspartate Aminotransferase (AST/SGOT): 12 U/L    Alanine Aminotransferase (ALT/SGPT): 8 U/L    eGFR if Non : 94: Interpretative comment  The units for eGFR are mL/min/1.73M2 (normalized body surface area). The  eGFR is calculated from a serum creatinine using the CKD-EPI equation.  Other variables required for calculation are race, age and sex. Among  patients with chronic kidney disease (CKD), the eGFR is useful in  determining the stage of disease according to KDOQI CKD classification.  All eGFR results are reported numerically with the following  interpretation.          GFR                    With                 Without     (ml/min/1.73 m2)    Kidney Damage       Kidney Damage        >= 90                    Stage 1                     Normal        60-89                    Stage 2                     Decreased GFR        30-59     Stage 3                     Stage 3        15-29                    Stage 4                     Stage 4        < 15                      Stage 5                     Stage 5  Each stage of CKD assumes that the associated GFR level has been in  effect for at least 3 months. Determination of stages one and two (with  eGFR > 59 ml/min/m2) requires estimation of kidney damage for at least 3  months as defined by structural or functional abnormalities.  Limitations: All estimates of GFR will be less accurate for patients at  extremes of muscle mass (including but not limited to frail elderly,  critically ill, or cancer patients), those with unusual diets, and those  with conditions associated with reduced secretion or extrarenal  elimination of creatinine. The eGFR equation is not recommended for use  in patients with unstable creatinine levels. mL/min/1.73M2    eGFR if African American: 110 mL/min/1.73M2

## 2019-07-26 NOTE — PROGRESS NOTE BEHAVIORAL HEALTH - SUMMARY
58 yrs. old male, domiciled at [facility] with H/O depression, feeling hopeless and helpless with suicidal ideations ,wanted to cut his wrist, mildly paranoid. He has been having suicidal ideations since last month and had frequent ER visits. His brother is very concerned.    Upon reassessment, patient is constrictive and a fairly poor historian, who continues to endorse suicidal ideation and plan, but less than previous encounter. Continue on home medications, which he has reportedly been consistent and compliant with. Will continue to monitor response to treatment, patient requires further stabilization.     # MDD with psychosis vs schizoaffective disorder vs bipolar disorder   - c/w Buspar 15 mg bid   - c/w Seroquel 100 mg am, 300 mg pm po qd   - c/w Haldol 10 mg po bid   - c/w Depakote 500 mg po bid (depakote levels 48 on 7/19)     # Anxiety   - c/w Buspar 15 mg bid   - started on Ativan 2 mg po bid   - Vistaril 25 mg po q6 for mild anxiety   - Ativan 1 mg po q8 prn moderate anxiety     # Severe agitation   - Haldol 5 mg po, IM if needed   - Ativan 2 mg po, IM if needed     # HTN   - c/w Norvasc 10 mg po qhs   - c/w Losartan 100 mg po qd   - medicine team is following.
58 yrs. old male, domiciled at [facility] with H/O depression, feeling hopeless and helpless with suicidal ideations ,wanted to cut his wrist, mildly paranoid. He has been having suicidal ideations since last month and had frequent ER visits. His brother is very concerned.    Upon reassessment, patient is constrictive and a fairly poor historian, who continues to endorse suicidal ideation and plan, but less than previous encounter. Continue on home medications, which he has reportedly been consistent and compliant with. Will continue to monitor response to treatment, patient requires further stabilization.     # MDD with psychosis vs schizoaffective disorder vs bipolar disorder   - c/w Buspar 15 mg bid   - c/w Seroquel 100 mg am, 300 mg pm po qd   - c/w Haldol 10 mg po bid   - c/w Depakote 500 mg po bid (depakote levels 48 on 7/19)     # Anxiety   - c/w Buspar 15 mg bid   - started on Ativan 2 mg po bid   - Vistaril 25 mg po q6 for mild anxiety   - Ativan 1 mg po q8 prn moderate anxiety     # Severe agitation   - Haldol 5 mg po, IM if needed   - Ativan 2 mg po, IM if needed     # HTN   - c/w Norvasc 10 mg po qhs   - c/w Losartan 100 mg po qd   - medicine team is following.
58 yrs. old male, domiciled at [facility] with H/O depression, feeling hopeless and helpless with suicidal ideations ,wanted to cut his wrist, mildly paranoid. He has been having suicidal ideations since last month and had frequent ER visits. His brother is very concerned.    Upon reassessment, patient is constrictive and a fairly poor historian, who continues to endorse suicidal ideation and plan, but less than previous encounter. Continue on home medications, which he has reportedly been consistent and compliant with. Will continue to monitor response to treatment, patient requires further stabilization.     # MDD with psychosis vs schizoaffective disorder vs bipolar disorder   - c/w Buspar 15 mg bid   - c/w Seroquel 100 mg am, 300 mg pm po qd   - c/w Haldol 10 mg po bid   - c/w Depakote 500 mg po bid (depakote levels 48 on 7/19)     # Anxiety   - c/w Buspar 15 mg bid   - started on Ativan 2 mg po bid   - Vistaril 25 mg po q6 for mild anxiety   - Ativan 1 mg po q8 prn moderate anxiety     # Severe agitation   - Haldol 5 mg po, IM if needed   - Ativan 2 mg po, IM if needed     # HTN   - c/w Norvasc 10 mg po qhs   - c/w Losartan 100 mg po qd   - medicine team is following.
58 yrs. old male, domiciled at [facility] with H/O depression, feeling hopeless and helpless with suicidal ideations ,wanted to cut his wrist, mildly paranoid. He has been having suicidal ideations since last month and had frequent ER visits. His brother is very concerned.    Upon reassessment, patient is constrictive and a fairly poor historian, who continues to endorse suicidal ideation and plan, but less than previous encounter. Continue on home medications, which he has reportedly been consistent and compliant with. Will continue to monitor response to treatment, patient requires further stabilization.     # MDD with psychosis vs schizoaffective disorder vs bipolar disorder   - c/w Buspar 15 mg bid   - c/w Seroquel 100 mg am, 300 mg pm po qd   - c/w Haldol 10 mg po bid   - c/w Depakote 500 mg po bid (depakote levels 48 on 7/19)     # Anxiety   - c/w Buspar 15 mg bid   - started on Ativan 2 mg po bid   - Vistaril 25 mg po q6 for mild anxiety   - Ativan 1 mg po q8 prn moderate anxiety     # Severe agitation   - Haldol 5 mg po, IM if needed   - Ativan 2 mg po, IM if needed     # HTN   - c/w Norvasc 10 mg po qhs   - c/w Losartan 100 mg po qd   - medicine team is following.
58 yrs. old male, domiciled at [facility] with H/O depression, feeling hopeless and helpless with suicidal ideations ,wanted to cut his wrist, mildly paranoid. He has been having suicidal ideations since last month and had frequent ER visits. His brother is very concerned.    Upon reassessment, patient is constrictive and a fairly poor historian, who continues to endorse suicidal ideation and plan, but less than previous encounter. Continue on home medications, which he has reportedly been consistent and compliant with. Will continue to monitor response to treatment, patient requires further stabilization.     # MDD with psychosis vs schizoaffective disorder vs bipolar disorder   - c/w Buspar 15 mg bid   - c/w Seroquel 100 mg am, 300 mg pm po qd   - c/w Haldol 10 mg po bid   - c/w Depakote 500 mg po bid (depakote levels 48 on 7/19)     # Anxiety   - c/w Buspar 15 mg bid   - Ativan 1 mg po bid   - Vistaril 25 mg po q6 for mild anxiety   - Ativan 1 mg po q8 prn moderate anxiety     # Severe agitation   - Haldol 5 mg po, IM if needed   - Ativan 2 mg po, IM if needed     # HTN   - c/w Norvasc 10 mg po qhs   - c/w Losartan 100 mg po qd   - medicine team is following.
58 yrs. old male, domiciled at [facility] with H/O depression, feeling hopeless and helpless with suicidal ideations ,wanted to cut his wrist, mildly paranoid. He has been having suicidal ideations since last month and had frequent ER visits. His brother is very concerned.    Upon reassessment, patient is constrictive and a fairly poor historian, who continues to endorse suicidal ideation and plan, but less than previous encounter. Continue on home medications, which he has reportedly been consistent and compliant with. Will continue to monitor response to treatment, patient requires further stabilization.     # MDD with psychosis vs schizoaffective disorder vs bipolar disorder   - c/w Buspar 15 mg bid   - c/w Seroquel 100 mg am, 300 mg pm po qd   - c/w Haldol 10 mg po bid   - c/w Depakote 500 mg po bid (depakote levels 48 on 7/19)     # Anxiety   - c/w Buspar 15 mg bid   - started on Ativan 2 mg po bid   - Vistaril 25 mg po q6 for mild anxiety   - Ativan 1 mg po q8 prn moderate anxiety     # Severe agitation   - Haldol 5 mg po, IM if needed   - Ativan 2 mg po, IM if needed     # HTN   - c/w Norvasc 10 mg po qhs   - c/w Losartan 100 mg po qd   - medicine team is following.
58 yrs. old male, domiciled at [facility] with H/O depression, feeling hopeless and helpless with suicidal ideations ,wanted to cut his wrist, mildly paranoid. He has been having suicidal ideations since last month and had frequent ER visits. His brother is very concerned.    Upon reassessment, patient is constrictive and a fairly poor historian, who continues to endorse suicidal ideation and plan. Will restart patient on home medications, which he has reportedly been consistent and compliant with. Awaiting further collateral on psychiatrist history. Will continue to monitor response to treatment, patient requires further stabilization.     # MDD with psychosis vs schizoaffective disorder vs bipolar disorder   - c/w Buspar 15 mg bid   - c/w Seroquel 100 mg am, 300 mg pm po qd   - c/w Haldol 10 mg po bid   - c/w Depakote 500 mg po bid (obtain Depakote levels)     # Anxiety   - c/w Buspar 15 mg bid   - started on Ativan 2 mg po bid   - Vistaril 25 mg po q6 for mild anxiety   - Ativan 1 mg po q8 prn moderate anxiety     # Severe agitation   - Haldol 5 mg po, IM if needed   - Ativan 2 mg po, IM if needed     # HTN   - c/w Norvasc 10 mg po qhs   - c/w Losartan 100 mg po qd   - medicine team is following.

## 2019-07-26 NOTE — PROGRESS NOTE BEHAVIORAL HEALTH - MODIFICATIONS
seen/discussed with resident. Less anxious and less dysphoric. No s/h ideation. Continue tx plan
seen/discussed with resident.  Pt not acutely dangerous to self/others, but not yet at baseline.
seen/discussed with resident.  Slow improvement noted.  Continue tx plan; no s/h ideation
seen/discussed with resident. No s/h ideation
seen/discussed with resident. No s/h ideation or overt psychosis
seen/discussed with resident.  Will resume meds and monitor. No active suicidal ideation at his time

## 2019-07-26 NOTE — PROGRESS NOTE BEHAVIORAL HEALTH - NSBHFUPSUICINTERVALFT_PSY_A_CORE
use scissors to cut wrist

## 2019-07-27 LAB
CHOLEST SERPL-MCNC: 208 MG/DL — HIGH (ref 100–200)
ESTIMATED AVERAGE GLUCOSE: 97 MG/DL — SIGNIFICANT CHANGE UP (ref 68–114)
HBA1C BLD-MCNC: 5 % — SIGNIFICANT CHANGE UP (ref 4–5.6)
HDLC SERPL-MCNC: 52 MG/DL — SIGNIFICANT CHANGE UP
LIPID PNL WITH DIRECT LDL SERPL: 145 MG/DL — HIGH (ref 4–129)
TOTAL CHOLESTEROL/HDL RATIO MEASUREMENT: 4 RATIO — SIGNIFICANT CHANGE UP (ref 4–5.5)
TRIGL SERPL-MCNC: 124 MG/DL — SIGNIFICANT CHANGE UP (ref 10–149)

## 2019-07-27 RX ADMIN — Medication 0.5 MILLIGRAM(S): at 08:54

## 2019-07-27 RX ADMIN — QUETIAPINE FUMARATE 100 MILLIGRAM(S): 200 TABLET, FILM COATED ORAL at 08:54

## 2019-07-27 RX ADMIN — HALOPERIDOL DECANOATE 10 MILLIGRAM(S): 100 INJECTION INTRAMUSCULAR at 08:54

## 2019-07-27 RX ADMIN — DIVALPROEX SODIUM 500 MILLIGRAM(S): 500 TABLET, DELAYED RELEASE ORAL at 20:12

## 2019-07-27 RX ADMIN — Medication 0.5 MILLIGRAM(S): at 20:12

## 2019-07-27 RX ADMIN — HALOPERIDOL DECANOATE 10 MILLIGRAM(S): 100 INJECTION INTRAMUSCULAR at 20:11

## 2019-07-27 RX ADMIN — Medication 15 MILLIGRAM(S): at 08:54

## 2019-07-27 RX ADMIN — Medication 1 MILLIGRAM(S): at 20:12

## 2019-07-27 RX ADMIN — LOSARTAN POTASSIUM 100 MILLIGRAM(S): 100 TABLET, FILM COATED ORAL at 08:54

## 2019-07-27 RX ADMIN — QUETIAPINE FUMARATE 300 MILLIGRAM(S): 200 TABLET, FILM COATED ORAL at 20:12

## 2019-07-27 RX ADMIN — DIVALPROEX SODIUM 500 MILLIGRAM(S): 500 TABLET, DELAYED RELEASE ORAL at 08:54

## 2019-07-27 RX ADMIN — Medication 15 MILLIGRAM(S): at 20:11

## 2019-07-27 RX ADMIN — Medication 1 MILLIGRAM(S): at 08:56

## 2019-07-27 RX ADMIN — Medication 1 PATCH: at 08:58

## 2019-07-27 RX ADMIN — AMLODIPINE BESYLATE 10 MILLIGRAM(S): 2.5 TABLET ORAL at 20:11

## 2019-07-27 RX ADMIN — Medication 1 PATCH: at 08:57

## 2019-07-28 RX ADMIN — DIVALPROEX SODIUM 500 MILLIGRAM(S): 500 TABLET, DELAYED RELEASE ORAL at 08:30

## 2019-07-28 RX ADMIN — HALOPERIDOL DECANOATE 10 MILLIGRAM(S): 100 INJECTION INTRAMUSCULAR at 20:08

## 2019-07-28 RX ADMIN — QUETIAPINE FUMARATE 300 MILLIGRAM(S): 200 TABLET, FILM COATED ORAL at 20:07

## 2019-07-28 RX ADMIN — LOSARTAN POTASSIUM 100 MILLIGRAM(S): 100 TABLET, FILM COATED ORAL at 08:30

## 2019-07-28 RX ADMIN — Medication 1 MILLIGRAM(S): at 08:32

## 2019-07-28 RX ADMIN — HALOPERIDOL DECANOATE 10 MILLIGRAM(S): 100 INJECTION INTRAMUSCULAR at 08:31

## 2019-07-28 RX ADMIN — Medication 15 MILLIGRAM(S): at 08:31

## 2019-07-28 RX ADMIN — Medication 1 MILLIGRAM(S): at 20:09

## 2019-07-28 RX ADMIN — Medication 15 MILLIGRAM(S): at 20:07

## 2019-07-28 RX ADMIN — QUETIAPINE FUMARATE 100 MILLIGRAM(S): 200 TABLET, FILM COATED ORAL at 08:30

## 2019-07-28 RX ADMIN — AMLODIPINE BESYLATE 10 MILLIGRAM(S): 2.5 TABLET ORAL at 20:07

## 2019-07-28 RX ADMIN — DIVALPROEX SODIUM 500 MILLIGRAM(S): 500 TABLET, DELAYED RELEASE ORAL at 20:07

## 2019-07-28 RX ADMIN — Medication 0.5 MILLIGRAM(S): at 20:10

## 2019-07-28 RX ADMIN — Medication 0.5 MILLIGRAM(S): at 08:31

## 2019-07-28 RX ADMIN — Medication 1 PATCH: at 08:32

## 2019-07-29 LAB — VALPROATE SERPL-MCNC: 59 UG/ML — SIGNIFICANT CHANGE UP (ref 50–100)

## 2019-07-29 PROCEDURE — 99231 SBSQ HOSP IP/OBS SF/LOW 25: CPT

## 2019-07-29 RX ADMIN — AMLODIPINE BESYLATE 10 MILLIGRAM(S): 2.5 TABLET ORAL at 20:10

## 2019-07-29 RX ADMIN — Medication 0.5 MILLIGRAM(S): at 20:10

## 2019-07-29 RX ADMIN — QUETIAPINE FUMARATE 100 MILLIGRAM(S): 200 TABLET, FILM COATED ORAL at 08:37

## 2019-07-29 RX ADMIN — Medication 25 MILLIGRAM(S): at 21:05

## 2019-07-29 RX ADMIN — Medication 0.5 MILLIGRAM(S): at 08:37

## 2019-07-29 RX ADMIN — Medication 1 MILLIGRAM(S): at 20:09

## 2019-07-29 RX ADMIN — Medication 15 MILLIGRAM(S): at 08:37

## 2019-07-29 RX ADMIN — Medication 15 MILLIGRAM(S): at 20:10

## 2019-07-29 RX ADMIN — LOSARTAN POTASSIUM 100 MILLIGRAM(S): 100 TABLET, FILM COATED ORAL at 08:37

## 2019-07-29 RX ADMIN — HALOPERIDOL DECANOATE 10 MILLIGRAM(S): 100 INJECTION INTRAMUSCULAR at 08:37

## 2019-07-29 RX ADMIN — Medication 1 MILLIGRAM(S): at 08:40

## 2019-07-29 RX ADMIN — QUETIAPINE FUMARATE 300 MILLIGRAM(S): 200 TABLET, FILM COATED ORAL at 20:10

## 2019-07-29 RX ADMIN — HALOPERIDOL DECANOATE 10 MILLIGRAM(S): 100 INJECTION INTRAMUSCULAR at 20:10

## 2019-07-29 RX ADMIN — Medication 1 PATCH: at 20:08

## 2019-07-29 RX ADMIN — DIVALPROEX SODIUM 500 MILLIGRAM(S): 500 TABLET, DELAYED RELEASE ORAL at 08:36

## 2019-07-29 RX ADMIN — DIVALPROEX SODIUM 500 MILLIGRAM(S): 500 TABLET, DELAYED RELEASE ORAL at 20:10

## 2019-07-29 RX ADMIN — Medication 1 PATCH: at 08:38

## 2019-07-29 NOTE — CHART NOTE - NSCHARTNOTEFT_GEN_A_CORE
Pt. reports feeling better but states he is not ready for discharge.  No episodes of incontinence were reported over the weekend.  Pt. continues to remain isolative to his room.  On this date, he denied any suicidal/homicidal ideations or any A/V hallucinations.  Pt. is not yet ready for discharge at this time.

## 2019-07-29 NOTE — PROGRESS NOTE BEHAVIORAL HEALTH - NSBHFUPINTERVALCCFT_PSY_A_CORE
Pt is less disorganized and more verbal, although he still says he can't yet return to his residence for ? reason. No s/h ideation

## 2019-07-30 PROCEDURE — 99231 SBSQ HOSP IP/OBS SF/LOW 25: CPT

## 2019-07-30 RX ORDER — DOCUSATE SODIUM 100 MG
100 CAPSULE ORAL THREE TIMES A DAY
Refills: 0 | Status: DISCONTINUED | OUTPATIENT
Start: 2019-07-30 | End: 2019-08-05

## 2019-07-30 RX ADMIN — Medication 1 PATCH: at 08:52

## 2019-07-30 RX ADMIN — Medication 0.5 MILLIGRAM(S): at 08:51

## 2019-07-30 RX ADMIN — Medication 1 PATCH: at 17:14

## 2019-07-30 RX ADMIN — Medication 1 PATCH: at 19:32

## 2019-07-30 RX ADMIN — HALOPERIDOL DECANOATE 10 MILLIGRAM(S): 100 INJECTION INTRAMUSCULAR at 08:51

## 2019-07-30 RX ADMIN — QUETIAPINE FUMARATE 300 MILLIGRAM(S): 200 TABLET, FILM COATED ORAL at 20:07

## 2019-07-30 RX ADMIN — Medication 1 MILLIGRAM(S): at 08:54

## 2019-07-30 RX ADMIN — Medication 1 MILLIGRAM(S): at 20:07

## 2019-07-30 RX ADMIN — Medication 25 MILLIGRAM(S): at 21:01

## 2019-07-30 RX ADMIN — DIVALPROEX SODIUM 500 MILLIGRAM(S): 500 TABLET, DELAYED RELEASE ORAL at 08:51

## 2019-07-30 RX ADMIN — Medication 15 MILLIGRAM(S): at 08:51

## 2019-07-30 RX ADMIN — HALOPERIDOL DECANOATE 10 MILLIGRAM(S): 100 INJECTION INTRAMUSCULAR at 20:07

## 2019-07-30 RX ADMIN — LOSARTAN POTASSIUM 100 MILLIGRAM(S): 100 TABLET, FILM COATED ORAL at 08:51

## 2019-07-30 RX ADMIN — AMLODIPINE BESYLATE 10 MILLIGRAM(S): 2.5 TABLET ORAL at 20:07

## 2019-07-30 RX ADMIN — Medication 15 MILLIGRAM(S): at 20:07

## 2019-07-30 RX ADMIN — DIVALPROEX SODIUM 500 MILLIGRAM(S): 500 TABLET, DELAYED RELEASE ORAL at 20:07

## 2019-07-30 RX ADMIN — Medication 0.5 MILLIGRAM(S): at 20:08

## 2019-07-30 RX ADMIN — Medication 100 MILLIGRAM(S): at 20:07

## 2019-07-30 RX ADMIN — QUETIAPINE FUMARATE 100 MILLIGRAM(S): 200 TABLET, FILM COATED ORAL at 08:51

## 2019-07-30 NOTE — PROGRESS NOTE BEHAVIORAL HEALTH - NSBHFUPINTERVALCCFT_PSY_A_CORE
Out of room more; more organized in thought, and less somatically preoccupied. No s/h ideation or overt psychosis

## 2019-07-31 PROCEDURE — 99231 SBSQ HOSP IP/OBS SF/LOW 25: CPT

## 2019-07-31 RX ADMIN — DIVALPROEX SODIUM 500 MILLIGRAM(S): 500 TABLET, DELAYED RELEASE ORAL at 20:18

## 2019-07-31 RX ADMIN — Medication 15 MILLIGRAM(S): at 20:18

## 2019-07-31 RX ADMIN — Medication 1 MILLIGRAM(S): at 20:21

## 2019-07-31 RX ADMIN — QUETIAPINE FUMARATE 300 MILLIGRAM(S): 200 TABLET, FILM COATED ORAL at 20:40

## 2019-07-31 RX ADMIN — Medication 15 MILLIGRAM(S): at 08:59

## 2019-07-31 RX ADMIN — Medication 0.5 MILLIGRAM(S): at 20:18

## 2019-07-31 RX ADMIN — Medication 1 MILLIGRAM(S): at 09:02

## 2019-07-31 RX ADMIN — Medication 100 MILLIGRAM(S): at 20:18

## 2019-07-31 RX ADMIN — HALOPERIDOL DECANOATE 10 MILLIGRAM(S): 100 INJECTION INTRAMUSCULAR at 08:59

## 2019-07-31 RX ADMIN — Medication 100 MILLIGRAM(S): at 12:02

## 2019-07-31 RX ADMIN — AMLODIPINE BESYLATE 10 MILLIGRAM(S): 2.5 TABLET ORAL at 20:18

## 2019-07-31 RX ADMIN — LOSARTAN POTASSIUM 100 MILLIGRAM(S): 100 TABLET, FILM COATED ORAL at 08:59

## 2019-07-31 RX ADMIN — Medication 100 MILLIGRAM(S): at 09:00

## 2019-07-31 RX ADMIN — Medication 25 MILLIGRAM(S): at 21:13

## 2019-07-31 RX ADMIN — Medication 0.5 MILLIGRAM(S): at 08:59

## 2019-07-31 RX ADMIN — HALOPERIDOL DECANOATE 10 MILLIGRAM(S): 100 INJECTION INTRAMUSCULAR at 20:18

## 2019-07-31 RX ADMIN — Medication 1 PATCH: at 09:04

## 2019-07-31 RX ADMIN — Medication 1 PATCH: at 17:51

## 2019-07-31 RX ADMIN — DIVALPROEX SODIUM 500 MILLIGRAM(S): 500 TABLET, DELAYED RELEASE ORAL at 08:59

## 2019-07-31 RX ADMIN — QUETIAPINE FUMARATE 100 MILLIGRAM(S): 200 TABLET, FILM COATED ORAL at 09:00

## 2019-07-31 NOTE — PROGRESS NOTE ADULT - ASSESSMENT
medically stable with no acute issues

## 2019-07-31 NOTE — CHART NOTE - NSCHARTNOTEFT_GEN_A_CORE
Pt. is presenting as less disorganized and less somatically preoccupied.  He continues to remain isolative to his room.  Pt. continues to deny any suicidal or homicidal ideation.  Writer will contact [place] [facility] on this date to try and arrange discharge for 8/2.  Pt. presents as stable for discharge on 8/2.

## 2019-07-31 NOTE — PROGRESS NOTE ADULT - PROBLEM SELECTOR PLAN 1
continue present treatment as per psych plan as reviewed  Medically stable with no new changes in treatment  will continue to monitor medical status while being treated on psych
continue present treatment as per psych plan as reviewed  Medically stable with no new changes in treatment  will continue to monitor medical status while being treated on psych  bp stasble

## 2019-07-31 NOTE — PROGRESS NOTE BEHAVIORAL HEALTH - NSBHFUPINTERVALCCFT_PSY_A_CORE
Mood is neutral; no s/h ideation or overt psychosis. No behavior issues. ADLs fair; CAROLINE to be obtained in prep for pt to return to his residence Mood is neutral; no s/h ideation or overt psychosis. No behavior issues. ADLs fair; Pt is generally in agreement to return to his residence

## 2019-07-31 NOTE — PROGRESS NOTE ADULT - PROBLEM SELECTOR PROBLEM 1
Major depression with psychotic features
Schizoaffective disorder, bipolar type

## 2019-07-31 NOTE — PROGRESS NOTE ADULT - REASON FOR ADMISSION
58 YEARS OLD MALE C/O SUICIDAL THOUGHTS .

## 2019-07-31 NOTE — PROGRESS NOTE ADULT - SUBJECTIVE AND OBJECTIVE BOX
pt stable alert in NAD  no new complaints    DEPRESSION SUICIDAL THOUGHTS  ^SUICIDAL THOUGHTS AND PLANNING  Handoff  MEWS Score  Schizophrenia  Hyperlipemia  Depression  HTN (hypertension)  No pertinent past medical history  Depression  Schizoaffective disorder, bipolar type  Essential hypertension  Suicidal thoughts  Depression  Major depression with psychotic features  No significant past surgical history  SUICIDAL THOUGHTS AND PLANNING  33  Suicidal thoughts    HEALTH ISSUES - PROBLEM Dx:  Schizoaffective disorder, bipolar type  Essential hypertension: Essential hypertension  Suicidal thoughts: Suicidal thoughts  Depression: Depression  Major depression with psychotic features        PAST MEDICAL & SURGICAL HISTORY:  Schizophrenia  Hyperlipemia  Depression  HTN (hypertension)  No significant past surgical history    tetanus toxoid (Swelling)      FAMILY HISTORY:      acetaminophen   Tablet .. 650 milliGRAM(s) Oral every 6 hours PRN  amLODIPine   Tablet 10 milliGRAM(s) Oral at bedtime  benztropine 0.5 milliGRAM(s) Oral two times a day  busPIRone 15 milliGRAM(s) Oral two times a day  diVALproex  milliGRAM(s) Oral daily  diVALproex  milliGRAM(s) Oral at bedtime  docusate sodium 100 milliGRAM(s) Oral three times a day  haloperidol     Tablet 10 milliGRAM(s) Oral two times a day  hydrOXYzine hydrochloride 25 milliGRAM(s) Oral every 6 hours PRN  ibuprofen  Tablet. 400 milliGRAM(s) Oral every 8 hours PRN  LORazepam     Tablet 1 milliGRAM(s) Oral two times a day  losartan 100 milliGRAM(s) Oral daily  nicotine - 21 mG/24Hr(s) Patch 1 patch Transdermal daily  QUEtiapine 100 milliGRAM(s) Oral daily  QUEtiapine 300 milliGRAM(s) Oral at bedtime      T(C): 35.6 (07-31-19 @ 06:03), Max: 35.7 (07-30-19 @ 19:50)  HR: 78 (07-31-19 @ 06:03) (78 - 97)  BP: 140/90 (07-31-19 @ 06:03) (140/90 - 145/86)  RR: 18 (07-31-19 @ 06:03) (16 - 18)  SpO2: --    PE;  general:  no chjanges noted innad    Lungs:    Heart:    EXT:    Neuro:  no deficits                          CAPILLARY BLOOD GLUCOSE
pt stable alert in NAD  no new complaints    DEPRESSION SUICIDAL THOUGHTS  ^SUICIDAL THOUGHTS AND PLANNING  Handoff  MEWS Score  Schizophrenia  Hyperlipemia  Depression  HTN (hypertension)  No pertinent past medical history  Depression  Essential hypertension  Suicidal thoughts  Depression  Major depression with psychotic features  No significant past surgical history  SUICIDAL THOUGHTS AND PLANNING  33  Suicidal thoughts    HEALTH ISSUES - PROBLEM Dx:  Essential hypertension: Essential hypertension  Suicidal thoughts: Suicidal thoughts  Depression: Depression  Major depression with psychotic features        PAST MEDICAL & SURGICAL HISTORY:  Schizophrenia  Hyperlipemia  Depression  HTN (hypertension)  No significant past surgical history    tetanus toxoid (Swelling)      FAMILY HISTORY:      acetaminophen   Tablet .. 650 milliGRAM(s) Oral every 6 hours PRN  amLODIPine   Tablet 10 milliGRAM(s) Oral at bedtime  benztropine 0.5 milliGRAM(s) Oral two times a day  busPIRone 15 milliGRAM(s) Oral two times a day  diVALproex  milliGRAM(s) Oral two times a day  haloperidol     Tablet 10 milliGRAM(s) Oral two times a day  hydrOXYzine hydrochloride 25 milliGRAM(s) Oral every 6 hours PRN  ibuprofen  Tablet. 400 milliGRAM(s) Oral every 8 hours PRN  LORazepam     Tablet 2 milliGRAM(s) Oral two times a day  LORazepam     Tablet 1 milliGRAM(s) Oral three times a day PRN  losartan 100 milliGRAM(s) Oral daily  nicotine - 21 mG/24Hr(s) Patch 1 patch Transdermal daily  QUEtiapine 100 milliGRAM(s) Oral daily  QUEtiapine 300 milliGRAM(s) Oral at bedtime      T(C): 36.4 (07-23-19 @ 18:09), Max: 36.4 (07-23-19 @ 18:09)  HR: 99 (07-23-19 @ 18:09) (99 - 99)  BP: 146/86 (07-23-19 @ 18:09) (146/86 - 146/86)  RR: 18 (07-23-19 @ 18:09) (18 - 18)  SpO2: --    PE;  general:  no acuate cahgnes in nad    Lungs:    Heart:    EXT:    Neuro:  alert no deficits                          CAPILLARY BLOOD GLUCOSE
pt stable alert in NAD  no new complaints    DEPRESSION SUICIDAL THOUGHTS  ^SUICIDAL THOUGHTS AND PLANNING  Handoff  MEWS Score  Schizophrenia  Hyperlipemia  Depression  HTN (hypertension)  No pertinent past medical history  Depression  Essential hypertension  Suicidal thoughts  Depression  Major depression with psychotic features  No significant past surgical history  SUICIDAL THOUGHTS AND PLANNING  33  Suicidal thoughts    HEALTH ISSUES - PROBLEM Dx:  Essential hypertension: Essential hypertension  Suicidal thoughts: Suicidal thoughts  Depression: Depression  Major depression with psychotic features        PAST MEDICAL & SURGICAL HISTORY:  Schizophrenia  Hyperlipemia  Depression  HTN (hypertension)  No significant past surgical history    tetanus toxoid (Swelling)      FAMILY HISTORY:      acetaminophen   Tablet .. 650 milliGRAM(s) Oral every 6 hours PRN  amLODIPine   Tablet 10 milliGRAM(s) Oral at bedtime  benztropine 0.5 milliGRAM(s) Oral two times a day  busPIRone 15 milliGRAM(s) Oral two times a day  diVALproex  milliGRAM(s) Oral two times a day  haloperidol     Tablet 10 milliGRAM(s) Oral two times a day  hydrOXYzine hydrochloride 25 milliGRAM(s) Oral every 6 hours PRN  ibuprofen  Tablet. 400 milliGRAM(s) Oral every 8 hours PRN  LORazepam     Tablet 2 milliGRAM(s) Oral two times a day  LORazepam     Tablet 1 milliGRAM(s) Oral three times a day PRN  losartan 100 milliGRAM(s) Oral daily  nicotine - 21 mG/24Hr(s) Patch 1 patch Transdermal daily  QUEtiapine 100 milliGRAM(s) Oral daily  QUEtiapine 300 milliGRAM(s) Oral at bedtime      T(C): 37 (07-22-19 @ 06:01), Max: 37 (07-22-19 @ 06:01)  HR: 77 (07-22-19 @ 06:01) (77 - 104)  BP: 164/94 (07-22-19 @ 06:01) (127/96 - 164/94)  RR: 20 (07-22-19 @ 06:01) (18 - 20)  SpO2: --    PE;  general:  no cahnges not ed in nad    Lungs:    Heart:    EXT:    Neuro:  alert nodecitis                          CAPILLARY BLOOD GLUCOSE
pt stable alert in NAD  no new complaints    DEPRESSION SUICIDAL THOUGHTS  ^SUICIDAL THOUGHTS AND PLANNING  Handoff  MEWS Score  Schizophrenia  Hyperlipemia  Depression  HTN (hypertension)  No pertinent past medical history  Depression  Schizoaffective disorder, bipolar type  Essential hypertension  Suicidal thoughts  Depression  Major depression with psychotic features  No significant past surgical history  SUICIDAL THOUGHTS AND PLANNING  33  Suicidal thoughts    HEALTH ISSUES - PROBLEM Dx:  Schizoaffective disorder, bipolar type  Essential hypertension: Essential hypertension  Suicidal thoughts: Suicidal thoughts  Depression: Depression  Major depression with psychotic features        PAST MEDICAL & SURGICAL HISTORY:  Schizophrenia  Hyperlipemia  Depression  HTN (hypertension)  No significant past surgical history    tetanus toxoid (Swelling)      FAMILY HISTORY:      acetaminophen   Tablet .. 650 milliGRAM(s) Oral every 6 hours PRN  amLODIPine   Tablet 10 milliGRAM(s) Oral at bedtime  benztropine 0.5 milliGRAM(s) Oral two times a day  busPIRone 15 milliGRAM(s) Oral two times a day  diVALproex  milliGRAM(s) Oral daily  diVALproex  milliGRAM(s) Oral at bedtime  haloperidol     Tablet 10 milliGRAM(s) Oral two times a day  hydrOXYzine hydrochloride 25 milliGRAM(s) Oral every 6 hours PRN  ibuprofen  Tablet. 400 milliGRAM(s) Oral every 8 hours PRN  LORazepam     Tablet 1 milliGRAM(s) Oral two times a day  losartan 100 milliGRAM(s) Oral daily  nicotine - 21 mG/24Hr(s) Patch 1 patch Transdermal daily  QUEtiapine 100 milliGRAM(s) Oral daily  QUEtiapine 300 milliGRAM(s) Oral at bedtime      T(C): 36.9 (07-29-19 @ 06:06), Max: 36.9 (07-29-19 @ 06:06)  HR: 80 (07-29-19 @ 06:06) (80 - 106)  BP: 121/75 (07-29-19 @ 06:06) (121/75 - 134/91)  RR: 18 (07-29-19 @ 06:06) (18 - 18)  SpO2: --    PE;  general:  no change from previeos    Lungs:    Heart:    EXT:    Neuro:  alert no deficits                          CAPILLARY BLOOD GLUCOSE

## 2019-08-01 PROCEDURE — 99231 SBSQ HOSP IP/OBS SF/LOW 25: CPT

## 2019-08-01 RX ADMIN — AMLODIPINE BESYLATE 10 MILLIGRAM(S): 2.5 TABLET ORAL at 20:24

## 2019-08-01 RX ADMIN — DIVALPROEX SODIUM 500 MILLIGRAM(S): 500 TABLET, DELAYED RELEASE ORAL at 20:24

## 2019-08-01 RX ADMIN — HALOPERIDOL DECANOATE 10 MILLIGRAM(S): 100 INJECTION INTRAMUSCULAR at 20:24

## 2019-08-01 RX ADMIN — Medication 15 MILLIGRAM(S): at 20:24

## 2019-08-01 RX ADMIN — Medication 1 MILLIGRAM(S): at 08:39

## 2019-08-01 RX ADMIN — Medication 0.5 MILLIGRAM(S): at 08:38

## 2019-08-01 RX ADMIN — Medication 100 MILLIGRAM(S): at 20:24

## 2019-08-01 RX ADMIN — Medication 0.5 MILLIGRAM(S): at 20:24

## 2019-08-01 RX ADMIN — QUETIAPINE FUMARATE 300 MILLIGRAM(S): 200 TABLET, FILM COATED ORAL at 20:24

## 2019-08-01 RX ADMIN — Medication 100 MILLIGRAM(S): at 08:39

## 2019-08-01 RX ADMIN — Medication 1 PATCH: at 08:36

## 2019-08-01 RX ADMIN — Medication 15 MILLIGRAM(S): at 08:38

## 2019-08-01 RX ADMIN — HALOPERIDOL DECANOATE 10 MILLIGRAM(S): 100 INJECTION INTRAMUSCULAR at 08:38

## 2019-08-01 RX ADMIN — LOSARTAN POTASSIUM 100 MILLIGRAM(S): 100 TABLET, FILM COATED ORAL at 08:38

## 2019-08-01 RX ADMIN — QUETIAPINE FUMARATE 100 MILLIGRAM(S): 200 TABLET, FILM COATED ORAL at 08:38

## 2019-08-01 RX ADMIN — DIVALPROEX SODIUM 500 MILLIGRAM(S): 500 TABLET, DELAYED RELEASE ORAL at 08:38

## 2019-08-01 RX ADMIN — Medication 25 MILLIGRAM(S): at 20:36

## 2019-08-01 RX ADMIN — Medication 1 MILLIGRAM(S): at 20:24

## 2019-08-02 PROCEDURE — 99231 SBSQ HOSP IP/OBS SF/LOW 25: CPT

## 2019-08-02 RX ADMIN — Medication 100 MILLIGRAM(S): at 08:44

## 2019-08-02 RX ADMIN — Medication 0.5 MILLIGRAM(S): at 20:17

## 2019-08-02 RX ADMIN — Medication 15 MILLIGRAM(S): at 08:44

## 2019-08-02 RX ADMIN — Medication 1 PATCH: at 08:44

## 2019-08-02 RX ADMIN — Medication 0.5 MILLIGRAM(S): at 08:44

## 2019-08-02 RX ADMIN — QUETIAPINE FUMARATE 100 MILLIGRAM(S): 200 TABLET, FILM COATED ORAL at 08:44

## 2019-08-02 RX ADMIN — Medication 1 PATCH: at 07:59

## 2019-08-02 RX ADMIN — LOSARTAN POTASSIUM 100 MILLIGRAM(S): 100 TABLET, FILM COATED ORAL at 08:44

## 2019-08-02 RX ADMIN — Medication 25 MILLIGRAM(S): at 20:18

## 2019-08-02 RX ADMIN — DIVALPROEX SODIUM 500 MILLIGRAM(S): 500 TABLET, DELAYED RELEASE ORAL at 08:44

## 2019-08-02 RX ADMIN — Medication 100 MILLIGRAM(S): at 20:18

## 2019-08-02 RX ADMIN — Medication 100 MILLIGRAM(S): at 12:17

## 2019-08-02 RX ADMIN — HALOPERIDOL DECANOATE 10 MILLIGRAM(S): 100 INJECTION INTRAMUSCULAR at 08:44

## 2019-08-02 RX ADMIN — AMLODIPINE BESYLATE 10 MILLIGRAM(S): 2.5 TABLET ORAL at 20:18

## 2019-08-02 RX ADMIN — Medication 1 PATCH: at 18:11

## 2019-08-02 RX ADMIN — Medication 1 MILLIGRAM(S): at 20:18

## 2019-08-02 RX ADMIN — DIVALPROEX SODIUM 500 MILLIGRAM(S): 500 TABLET, DELAYED RELEASE ORAL at 20:18

## 2019-08-02 RX ADMIN — HALOPERIDOL DECANOATE 10 MILLIGRAM(S): 100 INJECTION INTRAMUSCULAR at 20:18

## 2019-08-02 RX ADMIN — QUETIAPINE FUMARATE 300 MILLIGRAM(S): 200 TABLET, FILM COATED ORAL at 20:18

## 2019-08-02 RX ADMIN — Medication 15 MILLIGRAM(S): at 20:18

## 2019-08-02 RX ADMIN — Medication 1 MILLIGRAM(S): at 08:44

## 2019-08-02 NOTE — PROGRESS NOTE BEHAVIORAL HEALTH - NSBHATTESTSEENBY_PSY_A_CORE
attending Psychiatrist without NP/Trainee
Attending Psychiatrist supervising NP/Trainee, meeting pt...

## 2019-08-02 NOTE — PROGRESS NOTE BEHAVIORAL HEALTH - THOUGHT PROCESS
Linear
Perseverative
Perseverative
Linear
Perseverative

## 2019-08-02 NOTE — DISCHARGE NOTE BEHAVIORAL HEALTH - MEDICATION SUMMARY - MEDICATIONS TO TAKE
I will START or STAY ON the medications listed below when I get home from the hospital:    Aspir 81 oral delayed release tablet  -- 1 tab(s) by mouth once a day until told by MD to stop  -- Indication: For Essential hypertension    losartan 100 mg oral tablet  -- 1 tab(s) by mouth once a day until told by MD to stop  -- Indication: For Essential hypertension    divalproex sodium 500 mg oral delayed release tablet  -- 1 tab(s) by mouth once a day (at bedtime) until told by MD to stop  -- Indication: For Schizoaffective disorder, bipolar type    divalproex sodium 500 mg oral delayed release tablet  -- 1 tab(s) by mouth once a day in morning until told by MD to stop  -- Indication: For Schizoaffective disorder, bipolar type    benztropine 0.5 mg oral tablet  -- 1 tab(s) by mouth 2 times a day until told by MD to stop  -- Indication: For Schizoaffective disorder, bipolar type    SEROquel 100 mg oral tablet  -- 1 tab(s) by mouth once a day until told by MD to stop  -- Indication: For Schizoaffective disorder, bipolar type    SEROquel 300 mg oral tablet  -- 1 tab(s) by mouth once (at bedtime) until told by md to stop  -- Indication: For Schizoaffective disorder, bipolar type    busPIRone 15 mg oral tablet  -- 1 tab(s) by mouth 2 times a day until told by md to stop  -- Indication: For Schizoaffective disorder, bipolar type    Norvasc 5 mg oral tablet  -- 1 tab(s) by mouth once a day until told by MD to stop  -- Indication: For Essential hypertension    Colace 100 mg oral capsule  -- 1 cap(s) by mouth 3 times a day until told by MD to stop  -- Indication: For Constipation, unspecified constipation type

## 2019-08-02 NOTE — DISCHARGE NOTE BEHAVIORAL HEALTH - CONDITIONS AT DISCHARGE
Patient verbalizes understanding of discharge instruction and follow-up care. He denies hallucinations, delusions, and/or homicidal/suicidal ideation. He offers no complaints at this time.

## 2019-08-02 NOTE — PROGRESS NOTE BEHAVIORAL HEALTH - THOUGHT CONTENT
Unremarkable
Delusions
Delusions
Unremarkable
Delusions

## 2019-08-02 NOTE — CHART NOTE - NSCHARTNOTEFT_GEN_A_CORE
Mr. Simon remains on the unit for continued treatment, safety and observation. He was seen by  and treatment team during morning rounds. He is compliant and cooperative with treatment and open to accepting feedback from treatment team. His mood remains neutral. He denies and presents no evidence for suicidal and homicidal ideation. There have been no behavioral outbursts.     will continue to meet with patient one on one and with treatment team daily. Discharge plan is for patient to return to [facility] on Monday, 8/5. Patient is not yet ready for discharge.

## 2019-08-02 NOTE — PROGRESS NOTE BEHAVIORAL HEALTH - NSBHPTASSESSDT_PSY_A_CORE
01-Aug-2019 14:28
02-Aug-2019 13:05
18-Jul-2019 13:35
19-Jul-2019 16:03
22-Jul-2019 13:22
26-Jul-2019 11:18
29-Jul-2019 15:56
31-Jul-2019 11:55
30-Jul-2019 15:17
24-Jul-2019 13:43
23-Jul-2019 10:49
20-Jul-2019 23:19
25-Jul-2019 11:29

## 2019-08-02 NOTE — DISCHARGE NOTE BEHAVIORAL HEALTH - NSBHDCSWCOMMENTSFT_PSY_A_CORE
[phone] 1:30 pm

## 2019-08-02 NOTE — PROGRESS NOTE BEHAVIORAL HEALTH - ATTENTION / CONCENTRATION
Impaired

## 2019-08-02 NOTE — PROGRESS NOTE BEHAVIORAL HEALTH - NSBHADMITDANGERSELF_PSY_A_CORE
suicidal ideation with plan and means
suicidal ideation with plan and means
unable to care for self
unable to care for self
suicidal ideation with plan and means
suicidal ideation with plan and means
unable to care for self
suicidal ideation with plan and means

## 2019-08-02 NOTE — PROGRESS NOTE BEHAVIORAL HEALTH - NSBHADMITIPOBSFT_PSY_A_CORE
q20 minutes.
q20 minutes.
as clinically indicated
as indicated
q20 minutes.
q20 minutes.
as clinically indicated
as indicated
q20 minutes.

## 2019-08-02 NOTE — DISCHARGE NOTE BEHAVIORAL HEALTH - HPI (INCLUDE ILLNESS QUALITY, SEVERITY, DURATION, TIMING, CONTEXT, MODIFYING FACTORS, ASSOCIATED SIGNS AND SYMPTOMS)
57 y/o male resident of [facility] with long psych history, admitted with c/o suicidal ideation. Pt stated he had thoughts but no acute plan to cut his wrists because residents were bothering him where he lived.  No command hallucinations to harm self. No known history of self harm.    No substance abuse.    Pt was admitted and meds were continued. Over course of hospitalization his mood improved, and aud hallucinations stopped.   He no longer had thoughts to harm self, and he was more visible on unit.    Pt wa able to contract for safety, and was d/c'd back to residence in [facility]

## 2019-08-02 NOTE — PROGRESS NOTE BEHAVIORAL HEALTH - NS ED BHA MSE SPEECH SPONTANEITY
Increased latency

## 2019-08-02 NOTE — DISCHARGE NOTE BEHAVIORAL HEALTH - MEDICATION SUMMARY - MEDICATIONS TO STOP TAKING
I will STOP taking the medications listed below when I get home from the hospital:    Norvasc 5 mg oral tablet  -- 1 tab(s) by mouth once a day    losartan 100 mg oral tablet  -- 1 tab(s) by mouth once a day    Aspir 81 oral delayed release tablet  -- 1 tab(s) by mouth once a day

## 2019-08-02 NOTE — PROGRESS NOTE BEHAVIORAL HEALTH - PRIMARY DX
Major depression with psychotic features
Schizoaffective disorder, bipolar type
Major depression with psychotic features
Schizoaffective disorder, bipolar type
Major depression with psychotic features
Major depression with psychotic features
Schizoaffective disorder, bipolar type
Major depression with psychotic features

## 2019-08-03 RX ADMIN — DIVALPROEX SODIUM 500 MILLIGRAM(S): 500 TABLET, DELAYED RELEASE ORAL at 20:00

## 2019-08-03 RX ADMIN — Medication 15 MILLIGRAM(S): at 20:00

## 2019-08-03 RX ADMIN — Medication 15 MILLIGRAM(S): at 08:20

## 2019-08-03 RX ADMIN — HALOPERIDOL DECANOATE 10 MILLIGRAM(S): 100 INJECTION INTRAMUSCULAR at 20:00

## 2019-08-03 RX ADMIN — Medication 100 MILLIGRAM(S): at 08:20

## 2019-08-03 RX ADMIN — QUETIAPINE FUMARATE 300 MILLIGRAM(S): 200 TABLET, FILM COATED ORAL at 20:01

## 2019-08-03 RX ADMIN — AMLODIPINE BESYLATE 10 MILLIGRAM(S): 2.5 TABLET ORAL at 20:01

## 2019-08-03 RX ADMIN — Medication 1 PATCH: at 08:20

## 2019-08-03 RX ADMIN — Medication 1 PATCH: at 08:21

## 2019-08-03 RX ADMIN — Medication 25 MILLIGRAM(S): at 16:18

## 2019-08-03 RX ADMIN — Medication 100 MILLIGRAM(S): at 13:10

## 2019-08-03 RX ADMIN — LOSARTAN POTASSIUM 100 MILLIGRAM(S): 100 TABLET, FILM COATED ORAL at 08:20

## 2019-08-03 RX ADMIN — DIVALPROEX SODIUM 500 MILLIGRAM(S): 500 TABLET, DELAYED RELEASE ORAL at 08:20

## 2019-08-03 RX ADMIN — Medication 0.5 MILLIGRAM(S): at 20:01

## 2019-08-03 RX ADMIN — Medication 1 PATCH: at 20:34

## 2019-08-03 RX ADMIN — Medication 0.5 MILLIGRAM(S): at 08:19

## 2019-08-03 RX ADMIN — Medication 100 MILLIGRAM(S): at 20:00

## 2019-08-03 RX ADMIN — QUETIAPINE FUMARATE 100 MILLIGRAM(S): 200 TABLET, FILM COATED ORAL at 08:20

## 2019-08-03 RX ADMIN — HALOPERIDOL DECANOATE 10 MILLIGRAM(S): 100 INJECTION INTRAMUSCULAR at 08:20

## 2019-08-04 RX ADMIN — DIVALPROEX SODIUM 500 MILLIGRAM(S): 500 TABLET, DELAYED RELEASE ORAL at 20:40

## 2019-08-04 RX ADMIN — DIVALPROEX SODIUM 500 MILLIGRAM(S): 500 TABLET, DELAYED RELEASE ORAL at 08:21

## 2019-08-04 RX ADMIN — QUETIAPINE FUMARATE 100 MILLIGRAM(S): 200 TABLET, FILM COATED ORAL at 08:19

## 2019-08-04 RX ADMIN — Medication 100 MILLIGRAM(S): at 08:21

## 2019-08-04 RX ADMIN — Medication 25 MILLIGRAM(S): at 22:29

## 2019-08-04 RX ADMIN — Medication 1 PATCH: at 08:19

## 2019-08-04 RX ADMIN — AMLODIPINE BESYLATE 10 MILLIGRAM(S): 2.5 TABLET ORAL at 20:41

## 2019-08-04 RX ADMIN — HALOPERIDOL DECANOATE 10 MILLIGRAM(S): 100 INJECTION INTRAMUSCULAR at 08:20

## 2019-08-04 RX ADMIN — Medication 100 MILLIGRAM(S): at 12:00

## 2019-08-04 RX ADMIN — Medication 100 MILLIGRAM(S): at 20:46

## 2019-08-04 RX ADMIN — Medication 15 MILLIGRAM(S): at 20:40

## 2019-08-04 RX ADMIN — Medication 0.5 MILLIGRAM(S): at 08:19

## 2019-08-04 RX ADMIN — LOSARTAN POTASSIUM 100 MILLIGRAM(S): 100 TABLET, FILM COATED ORAL at 08:19

## 2019-08-04 RX ADMIN — HALOPERIDOL DECANOATE 10 MILLIGRAM(S): 100 INJECTION INTRAMUSCULAR at 20:41

## 2019-08-04 RX ADMIN — Medication 0.5 MILLIGRAM(S): at 20:45

## 2019-08-04 RX ADMIN — QUETIAPINE FUMARATE 300 MILLIGRAM(S): 200 TABLET, FILM COATED ORAL at 20:48

## 2019-08-04 RX ADMIN — Medication 15 MILLIGRAM(S): at 08:20

## 2019-08-05 VITALS
HEART RATE: 75 BPM | TEMPERATURE: 98 F | DIASTOLIC BLOOD PRESSURE: 95 MMHG | SYSTOLIC BLOOD PRESSURE: 148 MMHG | RESPIRATION RATE: 18 BRPM

## 2019-08-05 PROCEDURE — 99238 HOSP IP/OBS DSCHRG MGMT 30/<: CPT

## 2019-08-05 RX ORDER — ASPIRIN/CALCIUM CARB/MAGNESIUM 324 MG
1 TABLET ORAL
Qty: 0 | Refills: 0 | DISCHARGE

## 2019-08-05 RX ORDER — DOCUSATE SODIUM 100 MG
1 CAPSULE ORAL
Qty: 0 | Refills: 0 | DISCHARGE

## 2019-08-05 RX ORDER — ASPIRIN/CALCIUM CARB/MAGNESIUM 324 MG
1 TABLET ORAL
Qty: 30 | Refills: 0
Start: 2019-08-05

## 2019-08-05 RX ORDER — AMLODIPINE BESYLATE 2.5 MG/1
1 TABLET ORAL
Qty: 0 | Refills: 0 | DISCHARGE

## 2019-08-05 RX ORDER — QUETIAPINE FUMARATE 200 MG/1
1 TABLET, FILM COATED ORAL
Qty: 30 | Refills: 0
Start: 2019-08-05

## 2019-08-05 RX ORDER — DIVALPROEX SODIUM 500 MG/1
1 TABLET, DELAYED RELEASE ORAL
Qty: 30 | Refills: 0
Start: 2019-08-05

## 2019-08-05 RX ORDER — MAGNESIUM HYDROXIDE 400 MG/1
0 TABLET, CHEWABLE ORAL
Qty: 0 | Refills: 0 | DISCHARGE

## 2019-08-05 RX ORDER — BENZTROPINE MESYLATE 1 MG
1 TABLET ORAL
Qty: 60 | Refills: 0
Start: 2019-08-05

## 2019-08-05 RX ORDER — DOCUSATE SODIUM 100 MG
1 CAPSULE ORAL
Qty: 90 | Refills: 0
Start: 2019-08-05

## 2019-08-05 RX ORDER — AMLODIPINE BESYLATE 2.5 MG/1
1 TABLET ORAL
Qty: 30 | Refills: 0
Start: 2019-08-05

## 2019-08-05 RX ORDER — HALOPERIDOL DECANOATE 100 MG/ML
1 INJECTION INTRAMUSCULAR
Qty: 0 | Refills: 0 | DISCHARGE

## 2019-08-05 RX ORDER — LOSARTAN POTASSIUM 100 MG/1
1 TABLET, FILM COATED ORAL
Qty: 30 | Refills: 0
Start: 2019-08-05

## 2019-08-05 RX ORDER — QUETIAPINE FUMARATE 200 MG/1
1 TABLET, FILM COATED ORAL
Qty: 0 | Refills: 0 | DISCHARGE

## 2019-08-05 RX ORDER — BENZTROPINE MESYLATE 1 MG
1 TABLET ORAL
Qty: 0 | Refills: 0 | DISCHARGE

## 2019-08-05 RX ORDER — QUETIAPINE FUMARATE 200 MG/1
0 TABLET, FILM COATED ORAL
Qty: 0 | Refills: 0 | DISCHARGE

## 2019-08-05 RX ORDER — SODIUM CHLORIDE 9 MG/ML
0 INJECTION INTRAMUSCULAR; INTRAVENOUS; SUBCUTANEOUS
Qty: 0 | Refills: 0 | DISCHARGE

## 2019-08-05 RX ORDER — METOPROLOL TARTRATE 50 MG
1 TABLET ORAL
Qty: 0 | Refills: 0 | DISCHARGE

## 2019-08-05 RX ORDER — VALPROIC ACID (AS SODIUM SALT) 250 MG/5ML
500 SOLUTION, ORAL ORAL
Qty: 0 | Refills: 0 | DISCHARGE

## 2019-08-05 RX ORDER — LOSARTAN POTASSIUM 100 MG/1
1 TABLET, FILM COATED ORAL
Qty: 0 | Refills: 0 | DISCHARGE

## 2019-08-05 RX ADMIN — QUETIAPINE FUMARATE 100 MILLIGRAM(S): 200 TABLET, FILM COATED ORAL at 08:34

## 2019-08-05 RX ADMIN — HALOPERIDOL DECANOATE 10 MILLIGRAM(S): 100 INJECTION INTRAMUSCULAR at 08:34

## 2019-08-05 RX ADMIN — Medication 100 MILLIGRAM(S): at 08:34

## 2019-08-05 RX ADMIN — Medication 1 PATCH: at 08:33

## 2019-08-05 RX ADMIN — Medication 100 MILLIGRAM(S): at 12:37

## 2019-08-05 RX ADMIN — Medication 1 PATCH: at 08:30

## 2019-08-05 RX ADMIN — Medication 15 MILLIGRAM(S): at 08:33

## 2019-08-05 RX ADMIN — DIVALPROEX SODIUM 500 MILLIGRAM(S): 500 TABLET, DELAYED RELEASE ORAL at 08:34

## 2019-08-05 RX ADMIN — Medication 0.5 MILLIGRAM(S): at 08:33

## 2019-08-05 RX ADMIN — LOSARTAN POTASSIUM 100 MILLIGRAM(S): 100 TABLET, FILM COATED ORAL at 08:34

## 2019-08-05 RX ADMIN — Medication 1 PATCH: at 07:30

## 2019-08-05 NOTE — CHART NOTE - NSCHARTNOTEFT_GEN_A_CORE
Pt. is scheduled for discharge on this date.  He appears to have compensated since admission.  Pt. denies any suicidal or homicidal ideations or any A/V hallucinations.  He continues to remain isolative to his room.  Pt will return to his residence at [facility].  Required discharge documentation was faxed on 8/2.  The discharge summary will be faxed as well.  Pt. presents as stable for discharge on this date.

## 2019-08-08 ENCOUNTER — INPATIENT (INPATIENT)
Facility: HOSPITAL | Age: 59
LOS: 28 days | Discharge: NURSING, REHAB OR LONG-TERM CARE | End: 2019-09-06
Attending: PSYCHIATRY & NEUROLOGY | Admitting: PSYCHIATRY & NEUROLOGY
Payer: MEDICARE

## 2019-08-08 VITALS
WEIGHT: 240.08 LBS | SYSTOLIC BLOOD PRESSURE: 137 MMHG | HEIGHT: 70 IN | DIASTOLIC BLOOD PRESSURE: 85 MMHG | OXYGEN SATURATION: 96 % | HEART RATE: 98 BPM | RESPIRATION RATE: 16 BRPM | TEMPERATURE: 98 F

## 2019-08-08 DIAGNOSIS — F41.9 ANXIETY DISORDER, UNSPECIFIED: ICD-10-CM

## 2019-08-08 DIAGNOSIS — F25.8 OTHER SCHIZOAFFECTIVE DISORDERS: ICD-10-CM

## 2019-08-08 DIAGNOSIS — I10 ESSENTIAL (PRIMARY) HYPERTENSION: ICD-10-CM

## 2019-08-08 DIAGNOSIS — F25.0 SCHIZOAFFECTIVE DISORDER, BIPOLAR TYPE: ICD-10-CM

## 2019-08-08 DIAGNOSIS — R45.851 SUICIDAL IDEATIONS: ICD-10-CM

## 2019-08-08 DIAGNOSIS — R45.1 RESTLESSNESS AND AGITATION: ICD-10-CM

## 2019-08-08 DIAGNOSIS — K59.00 CONSTIPATION, UNSPECIFIED: ICD-10-CM

## 2019-08-08 DIAGNOSIS — E78.5 HYPERLIPIDEMIA, UNSPECIFIED: ICD-10-CM

## 2019-08-08 DIAGNOSIS — F32.9 MAJOR DEPRESSIVE DISORDER, SINGLE EPISODE, UNSPECIFIED: ICD-10-CM

## 2019-08-08 DIAGNOSIS — F17.210 NICOTINE DEPENDENCE, CIGARETTES, UNCOMPLICATED: ICD-10-CM

## 2019-08-08 DIAGNOSIS — F25.9 SCHIZOAFFECTIVE DISORDER, UNSPECIFIED: ICD-10-CM

## 2019-08-08 LAB
ALBUMIN SERPL ELPH-MCNC: 4.5 G/DL — SIGNIFICANT CHANGE UP (ref 3.5–5.2)
ALP SERPL-CCNC: 76 U/L — SIGNIFICANT CHANGE UP (ref 30–115)
ALT FLD-CCNC: 15 U/L — SIGNIFICANT CHANGE UP (ref 0–41)
ANION GAP SERPL CALC-SCNC: 12 MMOL/L — SIGNIFICANT CHANGE UP (ref 7–14)
APAP SERPL-MCNC: <5 UG/ML — LOW (ref 10–30)
AST SERPL-CCNC: 17 U/L — SIGNIFICANT CHANGE UP (ref 0–41)
BILIRUB SERPL-MCNC: 0.3 MG/DL — SIGNIFICANT CHANGE UP (ref 0.2–1.2)
BUN SERPL-MCNC: <3 MG/DL — LOW (ref 10–20)
CALCIUM SERPL-MCNC: 9.5 MG/DL — SIGNIFICANT CHANGE UP (ref 8.5–10.1)
CHLORIDE SERPL-SCNC: 97 MMOL/L — LOW (ref 98–110)
CO2 SERPL-SCNC: 22 MMOL/L — SIGNIFICANT CHANGE UP (ref 17–32)
CREAT SERPL-MCNC: 0.8 MG/DL — SIGNIFICANT CHANGE UP (ref 0.7–1.5)
ETHANOL SERPL-MCNC: <10 MG/DL — SIGNIFICANT CHANGE UP
GLUCOSE SERPL-MCNC: 112 MG/DL — HIGH (ref 70–99)
HCT VFR BLD CALC: 37.3 % — LOW (ref 42–52)
HGB BLD-MCNC: 13.3 G/DL — LOW (ref 14–18)
MCHC RBC-ENTMCNC: 28.6 PG — SIGNIFICANT CHANGE UP (ref 27–31)
MCHC RBC-ENTMCNC: 35.7 G/DL — SIGNIFICANT CHANGE UP (ref 32–37)
MCV RBC AUTO: 80.2 FL — SIGNIFICANT CHANGE UP (ref 80–94)
NRBC # BLD: 0 /100 WBCS — SIGNIFICANT CHANGE UP (ref 0–0)
PLATELET # BLD AUTO: 193 K/UL — SIGNIFICANT CHANGE UP (ref 130–400)
POTASSIUM SERPL-MCNC: 3.7 MMOL/L — SIGNIFICANT CHANGE UP (ref 3.5–5)
POTASSIUM SERPL-SCNC: 3.7 MMOL/L — SIGNIFICANT CHANGE UP (ref 3.5–5)
PROT SERPL-MCNC: 6.9 G/DL — SIGNIFICANT CHANGE UP (ref 6–8)
RBC # BLD: 4.65 M/UL — LOW (ref 4.7–6.1)
RBC # FLD: 13.2 % — SIGNIFICANT CHANGE UP (ref 11.5–14.5)
SALICYLATES SERPL-MCNC: <0.3 MG/DL — LOW (ref 4–30)
SODIUM SERPL-SCNC: 131 MMOL/L — LOW (ref 135–146)
VALPROATE SERPL-MCNC: 78 UG/ML — SIGNIFICANT CHANGE UP (ref 50–100)
WBC # BLD: 9.48 K/UL — SIGNIFICANT CHANGE UP (ref 4.8–10.8)
WBC # FLD AUTO: 9.48 K/UL — SIGNIFICANT CHANGE UP (ref 4.8–10.8)

## 2019-08-08 PROCEDURE — 90792 PSYCH DIAG EVAL W/MED SRVCS: CPT

## 2019-08-08 PROCEDURE — 99285 EMERGENCY DEPT VISIT HI MDM: CPT

## 2019-08-08 RX ORDER — LOSARTAN POTASSIUM 100 MG/1
100 TABLET, FILM COATED ORAL DAILY
Refills: 0 | Status: DISCONTINUED | OUTPATIENT
Start: 2019-08-08 | End: 2019-09-06

## 2019-08-08 RX ORDER — DIVALPROEX SODIUM 500 MG/1
500 TABLET, DELAYED RELEASE ORAL DAILY
Refills: 0 | Status: DISCONTINUED | OUTPATIENT
Start: 2019-08-08 | End: 2019-08-20

## 2019-08-08 RX ORDER — QUETIAPINE FUMARATE 200 MG/1
100 TABLET, FILM COATED ORAL DAILY
Refills: 0 | Status: DISCONTINUED | OUTPATIENT
Start: 2019-08-08 | End: 2019-08-21

## 2019-08-08 RX ORDER — DOCUSATE SODIUM 100 MG
100 CAPSULE ORAL THREE TIMES A DAY
Refills: 0 | Status: DISCONTINUED | OUTPATIENT
Start: 2019-08-08 | End: 2019-09-06

## 2019-08-08 RX ORDER — ASPIRIN/CALCIUM CARB/MAGNESIUM 324 MG
81 TABLET ORAL DAILY
Refills: 0 | Status: DISCONTINUED | OUTPATIENT
Start: 2019-08-08 | End: 2019-09-06

## 2019-08-08 RX ORDER — AMLODIPINE BESYLATE 2.5 MG/1
5 TABLET ORAL DAILY
Refills: 0 | Status: DISCONTINUED | OUTPATIENT
Start: 2019-08-08 | End: 2019-08-21

## 2019-08-08 RX ORDER — QUETIAPINE FUMARATE 200 MG/1
50 TABLET, FILM COATED ORAL THREE TIMES A DAY
Refills: 0 | Status: DISCONTINUED | OUTPATIENT
Start: 2019-08-08 | End: 2019-09-06

## 2019-08-08 RX ORDER — QUETIAPINE FUMARATE 200 MG/1
300 TABLET, FILM COATED ORAL AT BEDTIME
Refills: 0 | Status: DISCONTINUED | OUTPATIENT
Start: 2019-08-08 | End: 2019-08-21

## 2019-08-08 RX ORDER — NICOTINE POLACRILEX 2 MG
2 GUM BUCCAL
Refills: 0 | Status: DISCONTINUED | OUTPATIENT
Start: 2019-08-08 | End: 2019-08-18

## 2019-08-08 RX ORDER — DIVALPROEX SODIUM 500 MG/1
500 TABLET, DELAYED RELEASE ORAL AT BEDTIME
Refills: 0 | Status: DISCONTINUED | OUTPATIENT
Start: 2019-08-08 | End: 2019-08-20

## 2019-08-08 RX ADMIN — LOSARTAN POTASSIUM 100 MILLIGRAM(S): 100 TABLET, FILM COATED ORAL at 22:58

## 2019-08-08 RX ADMIN — Medication 100 MILLIGRAM(S): at 22:59

## 2019-08-08 RX ADMIN — DIVALPROEX SODIUM 500 MILLIGRAM(S): 500 TABLET, DELAYED RELEASE ORAL at 22:58

## 2019-08-08 RX ADMIN — QUETIAPINE FUMARATE 300 MILLIGRAM(S): 200 TABLET, FILM COATED ORAL at 23:00

## 2019-08-08 NOTE — ED ADULT NURSE NOTE - CHIEF COMPLAINT QUOTE
pt from [facility]  pt states he is suicidal Pt states he wants to stab himself with a kitchen knife. Pt was discharged 1 week ago after being hospitalized for the same thing

## 2019-08-08 NOTE — ED PROVIDER NOTE - NS ED ROS FT
Review of Systems    Constitutional: (-) fever/ chills  Eyes/ENT: (-) blurry vision, (-) epistaxis (-) sore throat (-) ear pain  Cardiovascular: (-) chest pain, (-) syncope (-) palpitations  Respiratory: (-) cough, (-) shortness of breath  Gastrointestinal: (-) vomiting, (-) diarrhea (-) abdominal pain  Musculoskeletal: (-) neck pain, (-) back pain, (-) joint pain (-) pedal edema   Integumentary: (-) rash, (-) swelling  Neurological: (-) headache, (-) altered mental status  Psychiatric: (-) hallucinations or depression   Allergic/Immunologic: (-) pruritus

## 2019-08-08 NOTE — H&P ADULT - NSHPLABSRESULTS_GEN_ALL_CORE
13.3   9.48  )-----------( 193      ( 08 Aug 2019 20:51 )             37.3       08-08    131<L>  |  97<L>  |  <3<L>  ----------------------------<  112<H>  3.7   |  22  |  0.8    Ca    9.5      08 Aug 2019 20:51    TPro  6.9  /  Alb  4.5  /  TBili  0.3  /  DBili  x   /  AST  17  /  ALT  15  /  AlkPhos  76  08-08                      Lactate Trend            CAPILLARY BLOOD GLUCOSE            Culture Results:   No growth (07-17 @ 19:30)

## 2019-08-08 NOTE — BEHAVIORAL HEALTH ASSESSMENT NOTE - NSBHSUICRISKFACTOR_PSY_A_CORE
Mood episode/Access to means (pills, firearms, etc.)/Hopelessness/Impulsivity/Unable to engage in safety planning

## 2019-08-08 NOTE — H&P ADULT - NSHPPHYSICALEXAM_GEN_ALL_CORE
Vital Signs Last 24 Hrs  T(C): 36.2 (08-08-19 @ 22:42)  T(F): 97.2 (08-08-19 @ 22:42), Max: 97.7 (08-08-19 @ 18:14)  HR: 98 (08-08-19 @ 22:42) (91 - 98)  BP: 167/103 (08-08-19 @ 22:42)  BP(mean): --  RR: 18 (08-08-19 @ 22:42) (16 - 18)  SpO2: 96% (08-08-19 @ 21:59) (96% - 96%)  Wt(kg): --

## 2019-08-08 NOTE — ED PROVIDER NOTE - ATTENDING CONTRIBUTION TO CARE
59yo M PMH noted, presents from residence with c/o wanting to hurt himself. Pt states he wants to stab himself in his wrists. Pt is upset over his mother’s death in 2015. Pt was d/c from hospital on 8/5 for SI. Pt with no medical complaints at this time. On exam pt in NAD, AAO x3, no signs of trauma, PERRL, EOMI, no lad, neck supple, OP clear, MMM, Lungs CTA B/L, no wrr, no mur, Abd is soft, + BS, NT ND, no edema, good ROM x all ext, no rash, steady gait

## 2019-08-08 NOTE — H&P ADULT - HISTORY OF PRESENT ILLNESS
57 y/o male with long psych hx, discharged from hospital on 8/5/19 for suicidal ideations. patient a resident at [facility]. patient with hx of HTN, schizophrenia, depression. patient denies hearing voices. patient denies any drugs or alcohol usage. patient states he wants to kill himself by slicing his wrists . patient compliant with his medications. patient is upset over his mother death 3 years ago.

## 2019-08-08 NOTE — ED ADULT TRIAGE NOTE - CHIEF COMPLAINT QUOTE
pt from [facility]  pt states he is suicidal Pt states he wants to stab himself with a kitchen knife. Pt was discharged 1 week ago after being hospitalized for the same thing

## 2019-08-08 NOTE — PATIENT PROFILE BEHAVIORAL HEALTH - PRO ARRIVE FROM
[facility]

## 2019-08-08 NOTE — ED PROVIDER NOTE - OBJECTIVE STATEMENT
57 y/o male with long psych hx, discharged from hospital on 8/5/19 for suicidal ideations. patient a resident at [facility]. patient with hx of HTN, schizophrenia, depression. patient denies hearing voices. patient denies any drugs or alcohol usage. patient states he wants to kill himself by slicing his wrists . patient compliant with his medications. patient is upset over his mother death 3 years ago.

## 2019-08-08 NOTE — ED PROVIDER NOTE - PROGRESS NOTE DETAILS
spoke with psych dr. woods who will evaluate the patient in the ED 57yo M PMH noted, presents with SI. Pt states he wants to stab himself in his wrists. Pt is upset over his mother’s death in 2015. Pt was d/c from hospital on 8/5 for SI. Pt with no medical complaints at this time. On exam pt in NAD, AAO x3, PERRL, EOMI, no lad, neck supple, OP clear, MMM, Lungs CTA B/L, no wrr, no mur, Abd is soft, + BS, NT ND, no edema, good ROM x all ext, no rash, steady gait

## 2019-08-08 NOTE — BEHAVIORAL HEALTH ASSESSMENT NOTE - SUMMARY
Pt is a 59yo m, [facility], with a long h/o mental illness, prior IPP ( his last admission was to our IPP from 7/17/19 to 8/5/19 ), no prior SA, on psych meds, carries a diagnosis of SAD, who became very depressed yesterday and started feeling suicidal, with a plan to cut his wrists or to stab himself. He approached an RN in his residence and endorsed this information. She called [phone] and the pt was BIB ED. Pt says that he feels suicidal and has an intention to hurt himself. He wants to be admitted and says that he can not trust himself. However, he  commits to safety in hospital.

## 2019-08-08 NOTE — BEHAVIORAL HEALTH ASSESSMENT NOTE - HPI (INCLUDE ILLNESS QUALITY, SEVERITY, DURATION, TIMING, CONTEXT, MODIFYING FACTORS, ASSOCIATED SIGNS AND SYMPTOMS)
Pt is a 57yo m, [facility], with a long h/o mental illness, prior IPP ( his last admission was to our IPP from 7/17/19 to 8/5/19 ), no prior SA, on psych meds, carries a diagnosis of SAD, who became very depressed yesterday and started feeling suicidal, with a plan to cut his wrists or to stab himself. He approached an RN in his residence and endorsed this information. She called [phone] and the pt was BIB ED. Pt says that he feels suicidal and has an intention to hurt himself. He wants to be admitted and says that he can not trust himself. However, he  commits to safety in hospital.

## 2019-08-09 PROCEDURE — 99232 SBSQ HOSP IP/OBS MODERATE 35: CPT

## 2019-08-09 RX ORDER — NICOTINE POLACRILEX 2 MG
1 GUM BUCCAL DAILY
Refills: 0 | Status: DISCONTINUED | OUTPATIENT
Start: 2019-08-09 | End: 2019-08-18

## 2019-08-09 RX ADMIN — DIVALPROEX SODIUM 500 MILLIGRAM(S): 500 TABLET, DELAYED RELEASE ORAL at 21:18

## 2019-08-09 RX ADMIN — Medication 1 MILLIGRAM(S): at 12:17

## 2019-08-09 RX ADMIN — QUETIAPINE FUMARATE 300 MILLIGRAM(S): 200 TABLET, FILM COATED ORAL at 21:19

## 2019-08-09 RX ADMIN — Medication 15 MILLIGRAM(S): at 17:05

## 2019-08-09 RX ADMIN — Medication 100 MILLIGRAM(S): at 08:45

## 2019-08-09 RX ADMIN — Medication 15 MILLIGRAM(S): at 08:45

## 2019-08-09 RX ADMIN — DIVALPROEX SODIUM 500 MILLIGRAM(S): 500 TABLET, DELAYED RELEASE ORAL at 08:45

## 2019-08-09 RX ADMIN — Medication 1 MILLIGRAM(S): at 18:29

## 2019-08-09 RX ADMIN — AMLODIPINE BESYLATE 5 MILLIGRAM(S): 2.5 TABLET ORAL at 08:45

## 2019-08-09 RX ADMIN — Medication 100 MILLIGRAM(S): at 17:05

## 2019-08-09 RX ADMIN — QUETIAPINE FUMARATE 100 MILLIGRAM(S): 200 TABLET, FILM COATED ORAL at 08:45

## 2019-08-09 RX ADMIN — Medication 81 MILLIGRAM(S): at 08:45

## 2019-08-09 NOTE — CHART NOTE - NSCHARTNOTEFT_GEN_A_CORE
Pt. was admitted after informing a nurse at his residence he was feeling depressed and suicidal.  Pt. stated he felt depressed and was thinking of cutting his wrists. He was discharged from this unit on 8/5/19.  Pt. was admitted between 7/17 and was discharged on 8/5/19.  His last admission was also due to suicidal ideation as well as auditory hallucinations.  Pt. has a diagnosis of schizoaffective disorder and multiple IPP admissions.  He resides at [facility].  Pt. will return to the residence upon discharge.  Mental health services are also received at the facility as well.  Pt. was encouraged to be less isolative to his room during this admission.  He minimally engaged with staff on this date.  Pt is not psychiatrically stable for discharge at this time.

## 2019-08-09 NOTE — CONSULT NOTE ADULT - ASSESSMENT
htn cont amilodipine and losartan    constipaion    Imp psych check b12 folate tsh  psych to follow up    moniter for signs/symptoms of constipation and then consider adding mom prn

## 2019-08-09 NOTE — CONSULT NOTE ADULT - SUBJECTIVE AND OBJECTIVE BOX
MAGUIEVA SAM  58y  Male      Patient is a 58y old  Male who presents with a chief complaint of 58 YEARS OLD MALE COME TO ER FOR PSYCHIATRIC EVALUATION . (08 Aug 2019 22:58)        PAST MEDICAL/SURGICAL HISTORY  PAST MEDICAL & SURGICAL HISTORY:  Schizophrenia  Hyperlipemia  Depression  HTN (hypertension)  No significant past surgical history      REVIEW OF SYSTEMS:  CONSTITUTIONAL: No fever, weight loss, or fatigue  EYES: No eye pain, visual disturbances, or discharge  ENMT:  No difficulty hearing, tinnitus, vertigo; No sinus or throat pain  NECK: No pain or stiffness  RESPIRATORY: No cough, wheezing, chills or hemoptysis; No shortness of breath  CARDIOVASCULAR: No chest pain, palpitations, dizziness, or leg swelling  GASTROINTESTINAL: No abdominal or epigastric pain. No nausea, vomiting, or hematemesis; No diarrhea or constipation. No melena or hematochezia.  GENITOURINARY: No dysuria, frequency, hematuria, or incontinence    ALLERY AND IMMUNOLOGIC: No hives or eczema    amLODIPine   Tablet 5 milliGRAM(s) Oral daily  aspirin enteric coated 81 milliGRAM(s) Oral daily  busPIRone 15 milliGRAM(s) Oral two times a day  diVALproex  milliGRAM(s) Oral daily  diVALproex  milliGRAM(s) Oral at bedtime  docusate sodium 100 milliGRAM(s) Oral three times a day  LORazepam     Tablet 1 milliGRAM(s) Oral three times a day PRN  losartan 100 milliGRAM(s) Oral daily  nicotine  Polacrilex Gum 2 milliGRAM(s) Oral four times a day PRN  nicotine - 21 mG/24Hr(s) Patch 1 patch Transdermal daily PRN  QUEtiapine 50 milliGRAM(s) Oral three times a day PRN  QUEtiapine 100 milliGRAM(s) Oral daily  QUEtiapine 300 milliGRAM(s) Oral at bedtime      T(C): 36.2 (08-08-19 @ 22:42), Max: 36.5 (08-08-19 @ 18:14)  HR: 96 (08-09-19 @ 09:26) (91 - 98)  BP: 147/87 (08-09-19 @ 09:26) (137/85 - 167/103)  RR: 18 (08-09-19 @ 09:26) (16 - 18)  SpO2: 96% (08-08-19 @ 21:59) (96% - 96%)  Wt(kg): --Vital Signs Last 24 Hrs  T(C): 36.2 (08 Aug 2019 22:42), Max: 36.5 (08 Aug 2019 18:14)  T(F): 97.2 (08 Aug 2019 22:42), Max: 97.7 (08 Aug 2019 18:14)  HR: 96 (09 Aug 2019 09:26) (91 - 98)  BP: 147/87 (09 Aug 2019 09:26) (137/85 - 167/103)  BP(mean): --  RR: 18 (09 Aug 2019 09:26) (16 - 18)  SpO2: 96% (08 Aug 2019 21:59) (96% - 96%)    PHYSICAL EXAM:  GENERAL: NAD, well-groomed, well-developed  HEAD:  Atraumatic, Normocephalic  EYES: EOMI, PERRLA, conjunctiva and sclera clear  ENMT: No tonsillar erythema, exudates, or enlargement; Moist mucous membranes, Good dentition, No lesions  NECK: Supple, No JVD, Normal thyroid  NERVOUS SYSTEM:  Alert & Oriented X3, Good concentration; Motor Strength 5/5 B/L upper and lower extremities; DTRs 2+ intact and symmetric  CHEST/LUNG: Clear to percussion bilaterally; No rales, rhonchi, wheezing, or rubs  HEART: Regular rate and rhythm; No murmurs, rubs, or gallops  ABDOMEN: Soft, Nontender, Nondistended; Bowel sounds present  EXTREMITIES:  2+ Peripheral Pulses, No clubbing, cyanosis, or edema  LYMPH: No lymphadenopathy noted  SKIN: No rashes or lesions      LABS:  CBC   08-08-19 @ 20:51  Hematcorit 37.3  Hemoglobin 13.3  Mean Cell Hemoglobin 28.6  Platelet Count-Automated 193  RBC Count 4.65  Red Cell Distrib Width 13.2  Wbc Count 9.48      BMP  08-08-19 @ 20:51  Anion Gap. Serum 12  Blood Urea Nitrogen,Serm <3  Calcium, Total Serum 9.5  Carbon Dioxide, Serum 22  Chloride, Serum 97  Creatinine, Serum 0.8  eGFR in  115  eGFR in Non Afican American 99  Gloucose, serum 112  Potassium, Serum 3.7  Sodium, Serum 131                  CMP  08-08-19 @ 20:51  Jennifer Aminotransferase(ALT/SGPT)15  Albumin, Serum 4.5  Alkaline Phosphatase, Serum 76  Anion Gap, Serum 12  Aspartate Aminotransferase (AST/SGOT)17  Bilirubin Total, Serum 0.3  Blood Urea Nitrogen, Serum <3  Calcium,Total Serum 9.5  Carbon Dioxide, Serum 22  Chloride, Serum 97  Creatinine, Serum 0.8  eGFR if  115  eGFR if Non African American 99  Glucose, Serum 112  Potassium, Serum 3.7  Protein Total, Serum 6.9  Sodium, Serum 131                          PT/INR      Amylase/Lipase            RADIOLOGY & ADDITIONAL TESTS:    Imaging Personally Reviewed:  [ ] YES  [ ] NO

## 2019-08-10 PROCEDURE — 99231 SBSQ HOSP IP/OBS SF/LOW 25: CPT

## 2019-08-10 RX ADMIN — Medication 81 MILLIGRAM(S): at 11:57

## 2019-08-10 RX ADMIN — DIVALPROEX SODIUM 500 MILLIGRAM(S): 500 TABLET, DELAYED RELEASE ORAL at 21:12

## 2019-08-10 RX ADMIN — AMLODIPINE BESYLATE 5 MILLIGRAM(S): 2.5 TABLET ORAL at 08:27

## 2019-08-10 RX ADMIN — Medication 15 MILLIGRAM(S): at 21:12

## 2019-08-10 RX ADMIN — Medication 1 MILLIGRAM(S): at 10:15

## 2019-08-10 RX ADMIN — Medication 100 MILLIGRAM(S): at 08:28

## 2019-08-10 RX ADMIN — Medication 15 MILLIGRAM(S): at 08:27

## 2019-08-10 RX ADMIN — QUETIAPINE FUMARATE 100 MILLIGRAM(S): 200 TABLET, FILM COATED ORAL at 11:58

## 2019-08-10 RX ADMIN — Medication 2 MILLIGRAM(S): at 21:18

## 2019-08-10 RX ADMIN — Medication 100 MILLIGRAM(S): at 21:12

## 2019-08-10 RX ADMIN — QUETIAPINE FUMARATE 300 MILLIGRAM(S): 200 TABLET, FILM COATED ORAL at 21:12

## 2019-08-10 RX ADMIN — Medication 100 MILLIGRAM(S): at 14:55

## 2019-08-10 RX ADMIN — Medication 1 MILLIGRAM(S): at 18:17

## 2019-08-10 RX ADMIN — DIVALPROEX SODIUM 500 MILLIGRAM(S): 500 TABLET, DELAYED RELEASE ORAL at 11:57

## 2019-08-10 RX ADMIN — LOSARTAN POTASSIUM 100 MILLIGRAM(S): 100 TABLET, FILM COATED ORAL at 08:28

## 2019-08-11 RX ORDER — SENNA PLUS 8.6 MG/1
1 TABLET ORAL EVERY 12 HOURS
Refills: 0 | Status: DISCONTINUED | OUTPATIENT
Start: 2019-08-11 | End: 2019-09-06

## 2019-08-11 RX ADMIN — QUETIAPINE FUMARATE 100 MILLIGRAM(S): 200 TABLET, FILM COATED ORAL at 11:53

## 2019-08-11 RX ADMIN — Medication 15 MILLIGRAM(S): at 08:16

## 2019-08-11 RX ADMIN — LOSARTAN POTASSIUM 100 MILLIGRAM(S): 100 TABLET, FILM COATED ORAL at 08:16

## 2019-08-11 RX ADMIN — QUETIAPINE FUMARATE 300 MILLIGRAM(S): 200 TABLET, FILM COATED ORAL at 20:29

## 2019-08-11 RX ADMIN — DIVALPROEX SODIUM 500 MILLIGRAM(S): 500 TABLET, DELAYED RELEASE ORAL at 20:29

## 2019-08-11 RX ADMIN — Medication 15 MILLIGRAM(S): at 20:29

## 2019-08-11 RX ADMIN — Medication 81 MILLIGRAM(S): at 11:53

## 2019-08-11 RX ADMIN — Medication 1 PATCH: at 20:28

## 2019-08-11 RX ADMIN — Medication 1 MILLIGRAM(S): at 11:23

## 2019-08-11 RX ADMIN — DIVALPROEX SODIUM 500 MILLIGRAM(S): 500 TABLET, DELAYED RELEASE ORAL at 11:53

## 2019-08-11 RX ADMIN — Medication 100 MILLIGRAM(S): at 08:16

## 2019-08-11 RX ADMIN — Medication 100 MILLIGRAM(S): at 20:29

## 2019-08-11 RX ADMIN — AMLODIPINE BESYLATE 5 MILLIGRAM(S): 2.5 TABLET ORAL at 08:16

## 2019-08-11 RX ADMIN — Medication 1 PATCH: at 11:52

## 2019-08-12 PROCEDURE — 99231 SBSQ HOSP IP/OBS SF/LOW 25: CPT | Mod: GC

## 2019-08-12 RX ADMIN — QUETIAPINE FUMARATE 300 MILLIGRAM(S): 200 TABLET, FILM COATED ORAL at 21:29

## 2019-08-12 RX ADMIN — AMLODIPINE BESYLATE 5 MILLIGRAM(S): 2.5 TABLET ORAL at 08:18

## 2019-08-12 RX ADMIN — Medication 100 MILLIGRAM(S): at 20:36

## 2019-08-12 RX ADMIN — QUETIAPINE FUMARATE 100 MILLIGRAM(S): 200 TABLET, FILM COATED ORAL at 11:54

## 2019-08-12 RX ADMIN — Medication 15 MILLIGRAM(S): at 08:18

## 2019-08-12 RX ADMIN — Medication 1 MILLIGRAM(S): at 19:32

## 2019-08-12 RX ADMIN — Medication 1 MILLIGRAM(S): at 10:57

## 2019-08-12 RX ADMIN — Medication 1 PATCH: at 12:02

## 2019-08-12 RX ADMIN — LOSARTAN POTASSIUM 100 MILLIGRAM(S): 100 TABLET, FILM COATED ORAL at 08:18

## 2019-08-12 RX ADMIN — DIVALPROEX SODIUM 500 MILLIGRAM(S): 500 TABLET, DELAYED RELEASE ORAL at 11:54

## 2019-08-12 RX ADMIN — Medication 15 MILLIGRAM(S): at 20:36

## 2019-08-12 RX ADMIN — Medication 1 PATCH: at 11:56

## 2019-08-12 RX ADMIN — Medication 100 MILLIGRAM(S): at 08:18

## 2019-08-12 RX ADMIN — DIVALPROEX SODIUM 500 MILLIGRAM(S): 500 TABLET, DELAYED RELEASE ORAL at 21:29

## 2019-08-12 RX ADMIN — Medication 1 PATCH: at 12:01

## 2019-08-12 RX ADMIN — Medication 81 MILLIGRAM(S): at 11:54

## 2019-08-12 RX ADMIN — Medication 1 PATCH: at 21:30

## 2019-08-12 NOTE — PROGRESS NOTE BEHAVIORAL HEALTH - NSBHFUPINTERVALHXFT_PSY_A_CORE
Pt is depressed reports suicidal ideations, is vague about having some AH. Claims antibiotics helped with him depression in the past

## 2019-08-12 NOTE — PROGRESS NOTE BEHAVIORAL HEALTH - PRN MEDS
LORazepam     Tablet   1 milliGRAM(s) Oral (08-12-19 @ 10:57)    nicotine - 21 mG/24Hr(s) Patch   1 Patch Transdermal (08-12-19 @ 11:56)

## 2019-08-12 NOTE — PROGRESS NOTE BEHAVIORAL HEALTH - NSBHCHARTREVIEWVS_PSY_A_CORE FT
T(C): 37.3 (08-12-19 @ 09:54), Max: 37.3 (08-12-19 @ 09:54)  HR: 62 (08-12-19 @ 09:54) (62 - 68)  BP: 120/80 (08-12-19 @ 09:54) (120/80 - 128/81)  RR: 16 (08-12-19 @ 09:54) (16 - 16)  SpO2: --

## 2019-08-12 NOTE — CHART NOTE - NSCHARTNOTEFT_GEN_A_CORE
Pt. admitted to suicidal thoughts over the weekend.  He continues to deny any plans or intents to harm himself.  Pt. continues to present as disorganized and remains isolative to his room.  He is denying any A/V hallucinations.  Pt. will return to [facility] upon discharge.

## 2019-08-13 LAB
FOLATE SERPL-MCNC: 12.7 NG/ML — SIGNIFICANT CHANGE UP
TSH SERPL-MCNC: 1.9 UIU/ML — SIGNIFICANT CHANGE UP (ref 0.27–4.2)
VIT B12 SERPL-MCNC: 539 PG/ML — SIGNIFICANT CHANGE UP (ref 232–1245)

## 2019-08-13 PROCEDURE — 99231 SBSQ HOSP IP/OBS SF/LOW 25: CPT | Mod: GC

## 2019-08-13 RX ORDER — HALOPERIDOL DECANOATE 100 MG/ML
5 INJECTION INTRAMUSCULAR AT BEDTIME
Refills: 0 | Status: DISCONTINUED | OUTPATIENT
Start: 2019-08-13 | End: 2019-09-06

## 2019-08-13 RX ADMIN — QUETIAPINE FUMARATE 300 MILLIGRAM(S): 200 TABLET, FILM COATED ORAL at 21:11

## 2019-08-13 RX ADMIN — Medication 15 MILLIGRAM(S): at 20:13

## 2019-08-13 RX ADMIN — QUETIAPINE FUMARATE 100 MILLIGRAM(S): 200 TABLET, FILM COATED ORAL at 11:43

## 2019-08-13 RX ADMIN — AMLODIPINE BESYLATE 5 MILLIGRAM(S): 2.5 TABLET ORAL at 08:26

## 2019-08-13 RX ADMIN — Medication 81 MILLIGRAM(S): at 11:43

## 2019-08-13 RX ADMIN — Medication 1 PATCH: at 12:34

## 2019-08-13 RX ADMIN — Medication 100 MILLIGRAM(S): at 08:26

## 2019-08-13 RX ADMIN — Medication 1 MILLIGRAM(S): at 17:20

## 2019-08-13 RX ADMIN — HALOPERIDOL DECANOATE 5 MILLIGRAM(S): 100 INJECTION INTRAMUSCULAR at 20:15

## 2019-08-13 RX ADMIN — Medication 1 PATCH: at 07:35

## 2019-08-13 RX ADMIN — QUETIAPINE FUMARATE 50 MILLIGRAM(S): 200 TABLET, FILM COATED ORAL at 12:22

## 2019-08-13 RX ADMIN — Medication 100 MILLIGRAM(S): at 13:21

## 2019-08-13 RX ADMIN — Medication 100 MILLIGRAM(S): at 20:13

## 2019-08-13 RX ADMIN — DIVALPROEX SODIUM 500 MILLIGRAM(S): 500 TABLET, DELAYED RELEASE ORAL at 11:43

## 2019-08-13 RX ADMIN — Medication 15 MILLIGRAM(S): at 08:26

## 2019-08-13 RX ADMIN — LOSARTAN POTASSIUM 100 MILLIGRAM(S): 100 TABLET, FILM COATED ORAL at 08:26

## 2019-08-13 RX ADMIN — DIVALPROEX SODIUM 500 MILLIGRAM(S): 500 TABLET, DELAYED RELEASE ORAL at 21:11

## 2019-08-13 NOTE — PROGRESS NOTE BEHAVIORAL HEALTH - NSBHCHARTREVIEWVS_PSY_A_CORE FT
T(C): 36.2 (08-13-19 @ 16:00), Max: 36.2 (08-13-19 @ 16:00)  HR: 86 (08-13-19 @ 16:00) (68 - 86)  BP: 122/85 (08-13-19 @ 16:00) (111/68 - 178/98)  RR: 16 (08-13-19 @ 16:00) (16 - 18)  SpO2: --

## 2019-08-13 NOTE — PROGRESS NOTE BEHAVIORAL HEALTH - PRN MEDS
LORazepam     Tablet   1 milliGRAM(s) Oral (08-12-19 @ 19:32)    QUEtiapine   50 milliGRAM(s) Oral (08-13-19 @ 12:22)

## 2019-08-13 NOTE — PROGRESS NOTE BEHAVIORAL HEALTH - NSBHFUPINTERVALHXFT_PSY_A_CORE
The patient attended today’s treatment team meeting participated in tx and discharge planning and signed the attendance sheet.      Pt was seen, and evaluated, and chart was reviewed. No acute events overnight.  Patient is alert,  calm, cooperative, compliant with treatment. Patient is in good behavioral control.  Pt presents no acute management problems.     ***Pt presents with  suicidal ideas but no imminent plans or intentions.  Pt is psychotic and admits to  A/ H.  ***Pt slept well, and reports normal appetite and regular bowel movements. Pt is tolerating meds well w/o any acute S/E.  Pt has no medication related complaints. Continues current medication regimen. will start haldol 5 mg po at bedtime forpsychosis.

## 2019-08-14 LAB — T PALLIDUM AB TITR SER: NEGATIVE — SIGNIFICANT CHANGE UP

## 2019-08-14 PROCEDURE — 99231 SBSQ HOSP IP/OBS SF/LOW 25: CPT

## 2019-08-14 RX ADMIN — Medication 15 MILLIGRAM(S): at 08:28

## 2019-08-14 RX ADMIN — Medication 1 MILLIGRAM(S): at 17:04

## 2019-08-14 RX ADMIN — Medication 81 MILLIGRAM(S): at 12:09

## 2019-08-14 RX ADMIN — HALOPERIDOL DECANOATE 5 MILLIGRAM(S): 100 INJECTION INTRAMUSCULAR at 20:55

## 2019-08-14 RX ADMIN — Medication 100 MILLIGRAM(S): at 14:04

## 2019-08-14 RX ADMIN — QUETIAPINE FUMARATE 50 MILLIGRAM(S): 200 TABLET, FILM COATED ORAL at 17:28

## 2019-08-14 RX ADMIN — DIVALPROEX SODIUM 500 MILLIGRAM(S): 500 TABLET, DELAYED RELEASE ORAL at 12:09

## 2019-08-14 RX ADMIN — QUETIAPINE FUMARATE 100 MILLIGRAM(S): 200 TABLET, FILM COATED ORAL at 12:09

## 2019-08-14 RX ADMIN — Medication 100 MILLIGRAM(S): at 08:28

## 2019-08-14 RX ADMIN — Medication 15 MILLIGRAM(S): at 20:55

## 2019-08-14 RX ADMIN — AMLODIPINE BESYLATE 5 MILLIGRAM(S): 2.5 TABLET ORAL at 08:28

## 2019-08-14 RX ADMIN — QUETIAPINE FUMARATE 300 MILLIGRAM(S): 200 TABLET, FILM COATED ORAL at 20:57

## 2019-08-14 RX ADMIN — Medication 100 MILLIGRAM(S): at 20:55

## 2019-08-14 RX ADMIN — LOSARTAN POTASSIUM 100 MILLIGRAM(S): 100 TABLET, FILM COATED ORAL at 08:28

## 2019-08-14 RX ADMIN — DIVALPROEX SODIUM 500 MILLIGRAM(S): 500 TABLET, DELAYED RELEASE ORAL at 20:57

## 2019-08-14 NOTE — PROGRESS NOTE BEHAVIORAL HEALTH - CASE SUMMARY
Pt is slight ly less psychotic and less thought disordered and more future oriented today. Continues current mes regimen

## 2019-08-14 NOTE — CHART NOTE - NSCHARTNOTEFT_GEN_A_CORE
Pt. continues to reports feelings of depression as well as suicidal ideation. He states he has thoughts of cutting his wrists but denies any plans or intents to act on these thoughts.  Pt. continues to isolate to his room and has been encouraged to decrease isolation.  Pt. presents as alert and oriented on all spheres. Upon discharge, pt. will return to his residence at [facility].  Pt. is not psychiatrically stable for discharge at this time.

## 2019-08-14 NOTE — PROGRESS NOTE BEHAVIORAL HEALTH - RISK ASSESSMENT
Patient is at moderate risk due to multiple IPP admissions that is mitigated by his ability to engage in safety planning. Patient is at moderate risk due to multiple IPP admissions that is mitigated by his ability to engage in safety planning and is future oriented.

## 2019-08-14 NOTE — PROGRESS NOTE BEHAVIORAL HEALTH - PRN MEDS
Lorazepam 1 mg Oral three times a day  Quetiapine 50 mg Oral three times a day LORazepam     Tablet   1 milliGRAM(s) Oral (08-13-19 @ 17:20)

## 2019-08-14 NOTE — PROGRESS NOTE BEHAVIORAL HEALTH - NSBHCHARTREVIEWVS_PSY_A_CORE FT
T(C): 36.1 (08-14-19 @ 10:00), Max: 36.2 (08-13-19 @ 16:00)  HR: 678 (08-14-19 @ 10:00) (68 - 678)  BP: 150/82 (08-14-19 @ 10:00) (122/85 - 150/82)  RR: 18 (08-14-19 @ 10:00) (16 - 18)  SpO2: --

## 2019-08-14 NOTE — PROGRESS NOTE BEHAVIORAL HEALTH - SUMMARY
57 y/o male with long psych hx, discharged from hospital on 8/5/19 for suicidal ideations. patient a resident at [facility]. patient with hx of HTN, schizophrenia, depression. patient denies hearing voices. patient denies any drugs or alcohol usage. patient states he wants to kill himself by slicing his wrists . patient compliant with his medications. patient is upset over his mother death 3 years ago.    Upon evaluation patient is alert, calm, and cooperative. Thought process is linear no perceptual disturbances. Admits to suicidal ideation, plan and intent but contracts to safety. Denies homicidal ideation, symptoms of psychosis, and agustín. Patient requires further stabilization.     Primary Dx Chronic schizoaffective disorder with acute exacerbation  - Continue medication regimen    HTN  - Losartan 100mg po daily  - Amlodipine 5mg po daily     Constipation   - Senna 1 tablet every 12 hours prn 57 y/o male with long psych hx, discharged from hospital on 8/5/19 for suicidal ideations. patient a resident at [facility]. patient with hx of HTN, schizophrenia, depression. patient denies hearing voices. patient denies any drugs or alcohol usage. patient states he wants to kill himself by slicing his wrists. Patient compliant with his medications. patient is upset over his mother death 3 years ago.    Upon evaluation patient is alert, calm, and cooperative. Thought process is linear no perceptual disturbances. Admits to suicidal ideation, plan, no intent. He contracts to safety. Denies homicidal ideation, symptoms of psychosis, and agustín. Patient requires further stabilization.     Primary Dx Chronic schizoaffective disorder with acute exacerbation  - Continue medication regimen    HTN  - Losartan 100mg po daily  - Amlodipine 5mg po daily     Constipation   - Senna 1 tablet every 12 hours prn
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
## 2019-08-15 PROCEDURE — 99231 SBSQ HOSP IP/OBS SF/LOW 25: CPT | Mod: GC

## 2019-08-15 PROCEDURE — 99221 1ST HOSP IP/OBS SF/LOW 40: CPT

## 2019-08-15 RX ADMIN — LOSARTAN POTASSIUM 100 MILLIGRAM(S): 100 TABLET, FILM COATED ORAL at 08:21

## 2019-08-15 RX ADMIN — Medication 81 MILLIGRAM(S): at 11:30

## 2019-08-15 RX ADMIN — Medication 100 MILLIGRAM(S): at 13:56

## 2019-08-15 RX ADMIN — HALOPERIDOL DECANOATE 5 MILLIGRAM(S): 100 INJECTION INTRAMUSCULAR at 20:12

## 2019-08-15 RX ADMIN — Medication 15 MILLIGRAM(S): at 20:13

## 2019-08-15 RX ADMIN — Medication 0.1 MILLIGRAM(S): at 20:12

## 2019-08-15 RX ADMIN — DIVALPROEX SODIUM 500 MILLIGRAM(S): 500 TABLET, DELAYED RELEASE ORAL at 11:30

## 2019-08-15 RX ADMIN — Medication 1 MILLIGRAM(S): at 11:31

## 2019-08-15 RX ADMIN — Medication 100 MILLIGRAM(S): at 20:13

## 2019-08-15 RX ADMIN — QUETIAPINE FUMARATE 300 MILLIGRAM(S): 200 TABLET, FILM COATED ORAL at 20:12

## 2019-08-15 RX ADMIN — QUETIAPINE FUMARATE 100 MILLIGRAM(S): 200 TABLET, FILM COATED ORAL at 11:30

## 2019-08-15 RX ADMIN — Medication 15 MILLIGRAM(S): at 08:22

## 2019-08-15 RX ADMIN — AMLODIPINE BESYLATE 5 MILLIGRAM(S): 2.5 TABLET ORAL at 08:22

## 2019-08-15 RX ADMIN — DIVALPROEX SODIUM 500 MILLIGRAM(S): 500 TABLET, DELAYED RELEASE ORAL at 20:13

## 2019-08-15 RX ADMIN — Medication 1 MILLIGRAM(S): at 20:12

## 2019-08-15 RX ADMIN — Medication 100 MILLIGRAM(S): at 08:22
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
## 2019-08-15 NOTE — PROGRESS NOTE BEHAVIORAL HEALTH - NSBHFUPINTERVALHXFT_PSY_A_CORE
Patient was seen, evaluated, and chart was reviewed. No acute events overnight.  Patient is alert,  calm, cooperative, compliant with treatment. States he feels "alright" and has no thoughts of hurting himself. States "something is wrong with my chest" and is concerned of enlarged breast. Patient is in good behavioral control. Endorses good appetite, normal bowel movements, and sleep. Patient is tolerating medications well w/o any acute S/E. Denies A/V hallucinations. Continue current medication regimen. Patient was seen, evaluated, and chart was reviewed. No acute events overnight.  Patient is alert,  calm, cooperative, compliant with treatment. States he feels "alright" and has no thoughts of hurting himself. States "something is wrong with my chest" and is concerned of enlarged breast. Patient is in good behavioral control. Endorses good appetite, normal bowel movements, and sleep. Patient is tolerating medications. Denies A/V hallucinations. Continue current medication regimen. Patient was seen, evaluated, and chart was reviewed. No acute events overnight.  Patient is alert,  calm, cooperative, compliant with treatment. States he feels "alright" and has no thoughts of hurting himself. States "something is wrong with my chest" and is concerned of enlarged breast. Patient is in good behavioral control. He has been mostly isolative and does not participate in groups. Endorses good appetite, normal bowel movements, and sleep. Patient is tolerating medications. Denies A/V hallucinations. Continue current medication regimen.

## 2019-08-15 NOTE — PROGRESS NOTE BEHAVIORAL HEALTH - NSBHATTESTSEENBY_PSY_A_CORE
Attending Psychiatrist supervising NP/Trainee, meeting pt... Trainee with telephonic supervision from Attending Psychiatrist

## 2019-08-15 NOTE — PROGRESS NOTE BEHAVIORAL HEALTH - NSBHCHARTREVIEWVS_PSY_A_CORE FT
T(C): 36.1 (08-14-19 @ 10:00), Max: 36.2 (08-13-19 @ 16:00)  HR: 678 (08-14-19 @ 10:00) (68 - 678)  BP: 150/82 (08-14-19 @ 10:00) (122/85 - 150/82)  RR: 18 (08-14-19 @ 10:00) (16 - 18)  SpO2: -- Vital Signs Last 24 Hrs  T(C): 35.8 (15 Aug 2019 10:00), Max: 36.6 (15 Aug 2019 06:12)  T(F): 96.5 (15 Aug 2019 10:00), Max: 97.8 (15 Aug 2019 06:12)  HR: 88 (15 Aug 2019 10:00) (69 - 88)  BP: 125/77 (15 Aug 2019 10:00) (125/77 - 159/78)  BP(mean): --  RR: 20 (15 Aug 2019 10:00) (17 - 20)  SpO2: --

## 2019-08-15 NOTE — CONSULT NOTE ADULT - SUBJECTIVE AND OBJECTIVE BOX
S : SAM REA is a 58y year old Male seen at bedside with a chief complaint of   painful thickened, dystrophic, ingrowing  and long toenails digits 1-5 bilaterally  and preventative foot examination. Patient is medically managed  by Medicine/Hospitalists.  Patient denies any history o f trauma to both feet.  Patient has no other pedal complaints.  Patient is experiencing pain while standing, walking and in shoe gear.     pt seen and assessed at bedside with attending Dr. Basilio    PMH: Schizophrenia  Hyperlipemia  Depression  HTN (hypertension)  No pertinent past medical history   PAST MEDICAL & SURGICAL HISTORY:  Schizophrenia  Hyperlipemia  Depression  HTN (hypertension)  No significant past surgical history    PSH:No significant past surgical history    Allergies:tetanus toxoid (Swelling)      Labs:      WBC Trend  9.48 Date (08-08 @ 20:51)      Chem          T(F): 96.5 (08-15-19 @ 10:00), Max: 97.8 (08-15-19 @ 06:12)  HR: 88 (08-15-19 @ 10:00) (69 - 88)  BP: 125/77 (08-15-19 @ 10:00) (125/77 - 159/78)  RR: 20 (08-15-19 @ 10:00) (17 - 20)  SpO2: --  Wt(kg): --    O:   DERM:  Skin warm, dry and supple bilateral.  No open lesions or inter-digital macerations noted bilateral.   Toenails 1-5 Right and Left feet thickened, elongated, discolored, and dystrophic with subungual debris. There is pain upon palpation of all fungal and ingrowing nails 1-5 bilaterally.   VASC: Dorsalis Pedis and Posterior Tibial pulses 1/4.  Capillary re-fill time less than 3 seconds digits 1-5 bilateral.   NEURO: Protective sensation intact to the level of the digits bilateral.  MSK: Muscle strength 5/5 all major muscle groups bilateral. No structural abnormality, bilaterally    A:   1. Bilateral dystrophic toenails  2. Pain from Elongated nails, ingrowing, incurvated,  and dystrophic nails upon palpation of nails.      P:   Discussed diagnosis and treatment with patient  Aseptic debridement and curretage  of all fungal and ingrowing  nails 1-5 bilateral with sterile nail nipper.  Discussed importance of daily foot examinations, drying of feet after bathing and proper shoe gear.  Attending updated.   Please re-consult as needed.  08-15-19 @ 11:05

## 2019-08-15 NOTE — PROGRESS NOTE BEHAVIORAL HEALTH - SUMMARY
57 y/o male with long psych hx, discharged from hospital on 8/5/19 for suicidal ideations. patient a resident at [facility]. patient with hx of HTN, schizophrenia, depression. patient denies hearing voices. patient denies any drugs or alcohol usage. patient states he wants to kill himself by slicing his wrists. Patient compliant with his medications. patient is upset over his mother death 3 years ago.    Upon evaluation patient is alert, calm, and cooperative. Thought process is linear no perceptual disturbances. Admits to suicidal ideation, plan, no intent. He contracts to safety. Denies homicidal ideation, symptoms of psychosis, and agustín. Patient requires further stabilization.     Primary Dx Chronic schizoaffective disorder with acute exacerbation  - Continue medication regimen    HTN  - Losartan 100mg po daily  - Amlodipine 5mg po daily     Constipation   - Senna 1 tablet every 12 hours prn 57 y/o male with long psych hx, discharged from hospital on 8/5/19 for suicidal ideations. patient a resident at [facility]. patient with hx of HTN, schizophrenia, depression. patient denies hearing voices. patient denies any drugs or alcohol usage. patient states he wants to kill himself by slicing his wrists. Patient compliant with his medications. patient is upset over his mother death 3 years ago.    Upon evaluation patient is alert, calm, and cooperative. Thought process is linear no perceptual disturbances. Denies suicidal and homicidal ideation, symptoms of psychosis, and agustín. Physical exam demonstrates gynecomastia. Patient requires further stabilization.     Primary Dx Chronic schizoaffective disorder with acute exacerbation  - Continue medication regimen    Gynecomastia   - Serum prolactin levels     HTN  - Losartan 100mg po daily  - Amlodipine 5mg po daily     Constipation   - Senna 1 tablet every 12 hours prn

## 2019-08-15 NOTE — PROGRESS NOTE BEHAVIORAL HEALTH - RISK ASSESSMENT
Patient is at moderate risk due to multiple IPP admissions that is mitigated by his ability to engage in safety planning and is future oriented.

## 2019-08-15 NOTE — PROGRESS NOTE BEHAVIORAL HEALTH - CASE SUMMARY
Pt is slight ly less psychotic and less thought disordered and more future oriented today. Continues current mes regimen Pt is gradually improving c lessening of depressive symptoms.

## 2019-08-15 NOTE — CONSULT NOTE ADULT - REASON FOR ADMISSION
58 YEARS OLD MALE COME TO ER FOR PSYCHIATRIC EVALUATION .
58 YEARS OLD MALE COME TO ER FOR PSYCHIATRIC EVALUATION .

## 2019-08-15 NOTE — PROGRESS NOTE BEHAVIORAL HEALTH - PRN MEDS
LORazepam     Tablet   1 milliGRAM(s) Oral (08-13-19 @ 17:20) LORazepam     Tablet   1 milliGRAM(s) Oral (08-15-19 @ 11:31)   1 milliGRAM(s) Oral (08-14-19 @ 17:04)    QUEtiapine   50 milliGRAM(s) Oral (08-14-19 @ 17:28)

## 2019-08-16 LAB — PROLACTIN SERPL-MCNC: 24.8 NG/ML — HIGH (ref 4.1–18.4)

## 2019-08-16 PROCEDURE — 99231 SBSQ HOSP IP/OBS SF/LOW 25: CPT | Mod: GC

## 2019-08-16 RX ADMIN — AMLODIPINE BESYLATE 5 MILLIGRAM(S): 2.5 TABLET ORAL at 08:14

## 2019-08-16 RX ADMIN — Medication 1 PATCH: at 08:14

## 2019-08-16 RX ADMIN — DIVALPROEX SODIUM 500 MILLIGRAM(S): 500 TABLET, DELAYED RELEASE ORAL at 20:14

## 2019-08-16 RX ADMIN — Medication 100 MILLIGRAM(S): at 08:14

## 2019-08-16 RX ADMIN — QUETIAPINE FUMARATE 50 MILLIGRAM(S): 200 TABLET, FILM COATED ORAL at 17:16

## 2019-08-16 RX ADMIN — Medication 15 MILLIGRAM(S): at 08:14

## 2019-08-16 RX ADMIN — LOSARTAN POTASSIUM 100 MILLIGRAM(S): 100 TABLET, FILM COATED ORAL at 08:14

## 2019-08-16 RX ADMIN — Medication 81 MILLIGRAM(S): at 13:51

## 2019-08-16 RX ADMIN — HALOPERIDOL DECANOATE 5 MILLIGRAM(S): 100 INJECTION INTRAMUSCULAR at 20:14

## 2019-08-16 RX ADMIN — Medication 100 MILLIGRAM(S): at 13:52

## 2019-08-16 RX ADMIN — Medication 15 MILLIGRAM(S): at 20:14

## 2019-08-16 RX ADMIN — Medication 100 MILLIGRAM(S): at 20:14

## 2019-08-16 RX ADMIN — DIVALPROEX SODIUM 500 MILLIGRAM(S): 500 TABLET, DELAYED RELEASE ORAL at 13:51

## 2019-08-16 RX ADMIN — QUETIAPINE FUMARATE 100 MILLIGRAM(S): 200 TABLET, FILM COATED ORAL at 13:51

## 2019-08-16 RX ADMIN — QUETIAPINE FUMARATE 300 MILLIGRAM(S): 200 TABLET, FILM COATED ORAL at 20:15

## 2019-08-16 NOTE — PROGRESS NOTE BEHAVIORAL HEALTH - RISK ASSESSMENT
Patient is at moderate risk due to multiple IPP admissions that is mitigated by his ability to engage in safety planning and no hx of suicide attempt.

## 2019-08-16 NOTE — PROGRESS NOTE BEHAVIORAL HEALTH - SUMMARY
57 y/o male with long psych hx, discharged from hospital on 8/5/19 for suicidal ideations. patient a resident at [facility]. patient with hx of HTN, schizophrenia, depression. patient denies hearing voices. patient denies any drugs or alcohol usage. patient states he wants to kill himself by slicing his wrists. Patient compliant with his medications. patient is upset over his mother death 3 years ago.    Upon evaluation patient is alert, calm, and cooperative. Thought process is linear no perceptual disturbances. Denies suicidal and homicidal ideation, symptoms of psychosis, and agustín. Physical exam demonstrates gynecomastia. Patient requires further evaluation and stabilization.     Primary Dx Chronic schizoaffective disorder with acute exacerbation  - Continue medication regimen    HTN  - Losartan 100mg po daily  - Amlodipine 5mg po daily     Constipation   - Senna 1 tablet every 12 hours prn

## 2019-08-16 NOTE — PROGRESS NOTE BEHAVIORAL HEALTH - CASE SUMMARY
Slightly less depressed today, denies SI today. . Pt continues to present c flat affect, poverty of speech and poverty of thought content, and diminished initiative and diminished interest in her surroundings. This may be consistent with some residual depression and/or  with persistent negative symptoms of schizophrenia. Slightly less depressed today, denies SI today. . Pt continues to present c flat affect, poverty of speech and poverty of thought content, and diminished initiative and diminished interest in his surroundings. This may be consistent with some residual depression and/or  with persistent negative symptoms of schizophrenia.

## 2019-08-16 NOTE — PROGRESS NOTE BEHAVIORAL HEALTH - PRN MEDS
LORazepam     Tablet   1 milliGRAM(s) Oral (08-15-19 @ 20:12)   1 milliGRAM(s) Oral (08-15-19 @ 11:31)    nicotine - 21 mG/24Hr(s) Patch   1 Patch Transdermal (08-16-19 @ 08:14)

## 2019-08-16 NOTE — PROGRESS NOTE BEHAVIORAL HEALTH - NSBHCHARTREVIEWVS_PSY_A_CORE FT
Vital Signs Last 24 Hrs  T(C): 36.1 (16 Aug 2019 10:04), Max: 36.4 (15 Aug 2019 17:44)  T(F): 96.9 (16 Aug 2019 10:04), Max: 97.6 (15 Aug 2019 17:44)  HR: 65 (16 Aug 2019 10:04) (65 - 84)  BP: 139/88 (16 Aug 2019 10:04) (126/73 - 150/79)  BP(mean): --  RR: 16 (16 Aug 2019 10:04) (16 - 18)  SpO2: --

## 2019-08-16 NOTE — CHART NOTE - NSCHARTNOTEFT_GEN_A_CORE
Pt. is presenting with flat affecting and some symptoms of depression. He also presents with poverty of speech. He is denying any suicidal or homicidal ideations at this time or any A/V hallucinations.  Pt. did express concerns about an enlarged breast. This issue is being addressed by medical staff. Pt. remains mostly isolative to his room and does not engage in any activities on the unit.  He continues to be encouraged to do so.  Upon discharge, pt. will return to his residence at [facility] where he also receives mental health services.  Pt. is not psychiatrically stable for discharge at this time.

## 2019-08-16 NOTE — PROGRESS NOTE BEHAVIORAL HEALTH - NSBHFUPINTERVALHXFT_PSY_A_CORE
Patient was seen, evaluated, and chart was reviewed. No acute events overnight.  Patient is alert,  calm, cooperative, compliant with treatment. States he feels "alright" and has no thoughts of hurting himself. States "something is wrong with my chest" and is concerned of enlarged breast. Patient is in good behavioral control. He has been mostly isolative and does not participate in groups. States he walks down the halls. Endorses good appetite and sleep. States he has not had bowel movements for a "few days." Patient is tolerating medications. Denies A/V hallucinations. Continue current medication regimen. Patient was seen, evaluated, and chart was reviewed. No acute events overnight.  Patient is alert,  calm, cooperative, compliant with treatment. States he feels "alright" and has no thoughts of hurting himself. States "something is wrong with my chest" and is concerned of enlarged breast. Patient is in good behavioral control. He has been mostly isolative and does not participate in groups. States he walks down the halls. Endorses good appetite and sleep. States he has not had bowel movements for a "few days." Patient is tolerating medications. Denies A/V hallucinations. Continue current medication regimen. However, Pt continues to present c flat affect, poverty of speech and poverty of thought content, and diminished initiative and diminished interest in her surroundings. This may be consistent with some residual depression and/or  with persistent negative symptoms of schizophrenia. Patient was seen, evaluated, and chart was reviewed. No acute events overnight.  Patient is alert,  calm, cooperative, compliant with treatment. States he feels "alright" and has no thoughts of hurting himself. States "something is wrong with my chest" and is concerned of enlarged breast. Patient is in good behavioral control. He has been mostly isolative and does not participate in groups. States he walks down the halls. Endorses good appetite and sleep. States he has not had bowel movements for a "few days." Patient is tolerating medications. Denies A/V hallucinations. Continue current medication regimen. However, Pt continues to present c flat affect, poverty of speech and poverty of thought content, and diminished initiative and diminished interest in his surroundings. This may be consistent with some residual depression and/or  with persistent negative symptoms of schizophrenia. Prolactin is slightly elevated.

## 2019-08-17 RX ADMIN — AMLODIPINE BESYLATE 5 MILLIGRAM(S): 2.5 TABLET ORAL at 08:14

## 2019-08-17 RX ADMIN — Medication 100 MILLIGRAM(S): at 08:14

## 2019-08-17 RX ADMIN — Medication 15 MILLIGRAM(S): at 20:53

## 2019-08-17 RX ADMIN — Medication 100 MILLIGRAM(S): at 20:53

## 2019-08-17 RX ADMIN — HALOPERIDOL DECANOATE 5 MILLIGRAM(S): 100 INJECTION INTRAMUSCULAR at 20:53

## 2019-08-17 RX ADMIN — Medication 100 MILLIGRAM(S): at 14:46

## 2019-08-17 RX ADMIN — Medication 1 PATCH: at 08:22

## 2019-08-17 RX ADMIN — Medication 1 PATCH: at 08:17

## 2019-08-17 RX ADMIN — QUETIAPINE FUMARATE 300 MILLIGRAM(S): 200 TABLET, FILM COATED ORAL at 20:53

## 2019-08-17 RX ADMIN — Medication 15 MILLIGRAM(S): at 08:14

## 2019-08-17 RX ADMIN — Medication 1 PATCH: at 20:54

## 2019-08-17 RX ADMIN — Medication 81 MILLIGRAM(S): at 11:57

## 2019-08-17 RX ADMIN — LOSARTAN POTASSIUM 100 MILLIGRAM(S): 100 TABLET, FILM COATED ORAL at 08:14

## 2019-08-17 RX ADMIN — DIVALPROEX SODIUM 500 MILLIGRAM(S): 500 TABLET, DELAYED RELEASE ORAL at 20:53

## 2019-08-17 RX ADMIN — QUETIAPINE FUMARATE 100 MILLIGRAM(S): 200 TABLET, FILM COATED ORAL at 11:57

## 2019-08-17 RX ADMIN — DIVALPROEX SODIUM 500 MILLIGRAM(S): 500 TABLET, DELAYED RELEASE ORAL at 11:57

## 2019-08-18 RX ORDER — NICOTINE POLACRILEX 2 MG
1 GUM BUCCAL DAILY
Refills: 0 | Status: DISCONTINUED | OUTPATIENT
Start: 2019-08-18 | End: 2019-09-06

## 2019-08-18 RX ADMIN — Medication 100 MILLIGRAM(S): at 13:27

## 2019-08-18 RX ADMIN — Medication 1 PATCH: at 20:52

## 2019-08-18 RX ADMIN — DIVALPROEX SODIUM 500 MILLIGRAM(S): 500 TABLET, DELAYED RELEASE ORAL at 11:30

## 2019-08-18 RX ADMIN — HALOPERIDOL DECANOATE 5 MILLIGRAM(S): 100 INJECTION INTRAMUSCULAR at 20:46

## 2019-08-18 RX ADMIN — QUETIAPINE FUMARATE 100 MILLIGRAM(S): 200 TABLET, FILM COATED ORAL at 11:30

## 2019-08-18 RX ADMIN — LOSARTAN POTASSIUM 100 MILLIGRAM(S): 100 TABLET, FILM COATED ORAL at 08:41

## 2019-08-18 RX ADMIN — Medication 15 MILLIGRAM(S): at 20:46

## 2019-08-18 RX ADMIN — Medication 81 MILLIGRAM(S): at 11:30

## 2019-08-18 RX ADMIN — Medication 100 MILLIGRAM(S): at 20:46

## 2019-08-18 RX ADMIN — Medication 15 MILLIGRAM(S): at 08:41

## 2019-08-18 RX ADMIN — AMLODIPINE BESYLATE 5 MILLIGRAM(S): 2.5 TABLET ORAL at 08:41

## 2019-08-18 RX ADMIN — Medication 100 MILLIGRAM(S): at 08:47

## 2019-08-18 RX ADMIN — QUETIAPINE FUMARATE 300 MILLIGRAM(S): 200 TABLET, FILM COATED ORAL at 20:46

## 2019-08-18 RX ADMIN — DIVALPROEX SODIUM 500 MILLIGRAM(S): 500 TABLET, DELAYED RELEASE ORAL at 20:46

## 2019-08-18 RX ADMIN — Medication 1 PATCH: at 10:41

## 2019-08-19 PROCEDURE — 99231 SBSQ HOSP IP/OBS SF/LOW 25: CPT

## 2019-08-19 RX ADMIN — Medication 1 PATCH: at 07:17

## 2019-08-19 RX ADMIN — Medication 1 PATCH: at 08:17

## 2019-08-19 RX ADMIN — Medication 100 MILLIGRAM(S): at 11:42

## 2019-08-19 RX ADMIN — Medication 1 PATCH: at 21:32

## 2019-08-19 RX ADMIN — QUETIAPINE FUMARATE 50 MILLIGRAM(S): 200 TABLET, FILM COATED ORAL at 22:01

## 2019-08-19 RX ADMIN — DIVALPROEX SODIUM 500 MILLIGRAM(S): 500 TABLET, DELAYED RELEASE ORAL at 11:36

## 2019-08-19 RX ADMIN — DIVALPROEX SODIUM 500 MILLIGRAM(S): 500 TABLET, DELAYED RELEASE ORAL at 21:31

## 2019-08-19 RX ADMIN — Medication 81 MILLIGRAM(S): at 11:36

## 2019-08-19 RX ADMIN — AMLODIPINE BESYLATE 5 MILLIGRAM(S): 2.5 TABLET ORAL at 08:17

## 2019-08-19 RX ADMIN — QUETIAPINE FUMARATE 50 MILLIGRAM(S): 200 TABLET, FILM COATED ORAL at 12:01

## 2019-08-19 RX ADMIN — Medication 1 PATCH: at 07:18

## 2019-08-19 RX ADMIN — Medication 100 MILLIGRAM(S): at 08:17

## 2019-08-19 RX ADMIN — LOSARTAN POTASSIUM 100 MILLIGRAM(S): 100 TABLET, FILM COATED ORAL at 08:17

## 2019-08-19 RX ADMIN — QUETIAPINE FUMARATE 300 MILLIGRAM(S): 200 TABLET, FILM COATED ORAL at 22:01

## 2019-08-19 RX ADMIN — Medication 15 MILLIGRAM(S): at 20:25

## 2019-08-19 RX ADMIN — HALOPERIDOL DECANOATE 5 MILLIGRAM(S): 100 INJECTION INTRAMUSCULAR at 20:25

## 2019-08-19 RX ADMIN — Medication 15 MILLIGRAM(S): at 08:17

## 2019-08-19 RX ADMIN — QUETIAPINE FUMARATE 100 MILLIGRAM(S): 200 TABLET, FILM COATED ORAL at 11:36

## 2019-08-19 RX ADMIN — Medication 100 MILLIGRAM(S): at 20:25

## 2019-08-19 NOTE — PROGRESS NOTE BEHAVIORAL HEALTH - NSBHFUPINTERVALHXFT_PSY_A_CORE
Patient was seen, evaluated, and chart was reviewed. No acute events overnight.  Patient is alert,  calm, cooperative, compliant with treatment. States he feels "fine" and currently has no thoughts of hurting himself. If he has thoughts of hurting himself, he will inform the staff. He complaints "my chest is swollen" and denies chest pain, palpitations, and shortness of breath. Patient is in good behavioral control. He has been more visible on the unit however he does not participate in groups. Patient states he is not interested in groups and does not socialize with anyone in the hallways. Endorses good appetite and sleep. States he has not had bowel movements for a "while" but refuses to elaborate on the number of days. Patient is tolerating medications. Denies A/V hallucinations. Continue current medication regimen. Pt continues to present c flat affect, poverty of speech and poverty of thought content, and diminished initiative and diminished interest in his surroundings. This may be consistent with some residual depression and/or  with persistent negative symptoms of schizophrenia. Prolactin is slightly elevated.

## 2019-08-19 NOTE — PROGRESS NOTE BEHAVIORAL HEALTH - NSBHCHARTREVIEWVS_PSY_A_CORE FT
Vital Signs Last 24 Hrs  T(C): 36.7 (19 Aug 2019 08:30), Max: 36.7 (19 Aug 2019 08:30)  T(F): 98 (19 Aug 2019 08:30), Max: 98 (19 Aug 2019 08:30)  HR: 77 (19 Aug 2019 08:30) (65 - 79)  BP: 128/83 (19 Aug 2019 08:30) (128/83 - 137/79)  BP(mean): --  RR: 18 (19 Aug 2019 08:30) (16 - 18)  SpO2: --

## 2019-08-19 NOTE — PROGRESS NOTE BEHAVIORAL HEALTH - PRN MEDS
QUEtiapine 50 milliGRAM(s) Oral three times a day PRN agitation  senna 1 Tablet(s) Oral every 12 hours PRN Constipation

## 2019-08-19 NOTE — CHART NOTE - NSCHARTNOTEFT_GEN_A_CORE
Pt. is demonstrating improvement in mental status.  He is denying any suicidal or homicidal ideations or any A/V hallucinations.  Pt. is presenting as more visible on the unit but is not socializing with peers.  He presents as alert and oriented on all spheres.  Upon discharge, pt. will return to his residence at [facility].  Pt. is not ready for discharge at this time.

## 2019-08-19 NOTE — PROGRESS NOTE BEHAVIORAL HEALTH - SUMMARY
59 y/o male with long psych hx, discharged from hospital on 8/5/19 for suicidal ideations. patient a resident at [facility]. patient with hx of HTN, schizophrenia, depression. patient denies hearing voices. patient denies any drugs or alcohol usage. patient states he wants to kill himself by slicing his wrists. Patient compliant with his medications. patient is upset over his mother death 3 years ago.    Upon evaluation patient is alert, calm, and cooperative. Thought process is linear no perceptual disturbances. Denies suicidal and homicidal ideation, symptoms of psychosis, and agustín. Physical exam demonstrates gynecomastia. Patient requires further evaluation and stabilization.     Primary Dx Chronic schizoaffective disorder with acute exacerbation  - Continue medication regimen    HTN  - Losartan 100mg po daily  - Amlodipine 5mg po daily     Constipation   - Senna 1 tablet every 12 hours prn

## 2019-08-20 PROCEDURE — 99232 SBSQ HOSP IP/OBS MODERATE 35: CPT | Mod: GC

## 2019-08-20 RX ORDER — DIVALPROEX SODIUM 500 MG/1
500 TABLET, DELAYED RELEASE ORAL
Refills: 0 | Status: DISCONTINUED | OUTPATIENT
Start: 2019-08-20 | End: 2019-09-06

## 2019-08-20 RX ADMIN — Medication 100 MILLIGRAM(S): at 20:24

## 2019-08-20 RX ADMIN — Medication 1 PATCH: at 08:18

## 2019-08-20 RX ADMIN — QUETIAPINE FUMARATE 300 MILLIGRAM(S): 200 TABLET, FILM COATED ORAL at 20:24

## 2019-08-20 RX ADMIN — Medication 100 MILLIGRAM(S): at 08:17

## 2019-08-20 RX ADMIN — DIVALPROEX SODIUM 500 MILLIGRAM(S): 500 TABLET, DELAYED RELEASE ORAL at 20:24

## 2019-08-20 RX ADMIN — QUETIAPINE FUMARATE 50 MILLIGRAM(S): 200 TABLET, FILM COATED ORAL at 23:08

## 2019-08-20 RX ADMIN — QUETIAPINE FUMARATE 100 MILLIGRAM(S): 200 TABLET, FILM COATED ORAL at 12:41

## 2019-08-20 RX ADMIN — Medication 1 PATCH: at 07:56

## 2019-08-20 RX ADMIN — Medication 81 MILLIGRAM(S): at 12:41

## 2019-08-20 RX ADMIN — Medication 15 MILLIGRAM(S): at 20:24

## 2019-08-20 RX ADMIN — Medication 100 MILLIGRAM(S): at 14:56

## 2019-08-20 RX ADMIN — AMLODIPINE BESYLATE 5 MILLIGRAM(S): 2.5 TABLET ORAL at 08:17

## 2019-08-20 RX ADMIN — Medication 1 PATCH: at 07:57

## 2019-08-20 RX ADMIN — LOSARTAN POTASSIUM 100 MILLIGRAM(S): 100 TABLET, FILM COATED ORAL at 08:17

## 2019-08-20 RX ADMIN — Medication 15 MILLIGRAM(S): at 08:17

## 2019-08-20 RX ADMIN — HALOPERIDOL DECANOATE 5 MILLIGRAM(S): 100 INJECTION INTRAMUSCULAR at 20:24

## 2019-08-20 RX ADMIN — Medication 1 PATCH: at 19:46

## 2019-08-20 NOTE — PROGRESS NOTE BEHAVIORAL HEALTH - NSBHCHARTREVIEWVS_PSY_A_CORE FT
Vital Signs Last 24 Hrs  T(C): 36.3 (20 Aug 2019 11:15), Max: 36.7 (19 Aug 2019 17:59)  T(F): 97.4 (20 Aug 2019 11:15), Max: 98 (19 Aug 2019 17:59)  HR: 74 (20 Aug 2019 11:15) (56 - 75)  BP: 140/69 (20 Aug 2019 11:15) (140/69 - 143/72)  BP(mean): --  RR: 16 (20 Aug 2019 11:15) (16 - 18)  SpO2: --

## 2019-08-20 NOTE — PROGRESS NOTE BEHAVIORAL HEALTH - NSBHFUPINTERVALHXFT_PSY_A_CORE
Patient was seen, evaluated, and chart was reviewed. No acute events overnight.  Patient is alert,  calm, cooperative, compliant with treatment. States he feels "fine" and sad that his mother passed away and his father is  but he is less sad than usual. Currently has no thoughts of hurting himself. States from time to time he has thoughts of dieing by cutting his wrist. If he has thoughts of hurting himself, he will inform the staff. States he looks forward to discharge. Patient is in good behavioral control. He has been more visible on the unit however he does not participate in groups. He states he is not interested in groups and he likes standing in the hallways. Endorses good appetite and sleep. Patient is tolerating medications. Denies A/V hallucinations. Continue current medication regimen. Pt continues to present c flat affect, poverty of speech and poverty of thought content, and diminished initiative and diminished interest in his surroundings. This may be consistent with some residual depression and/or  with persistent negative symptoms of schizophrenia. Prolactin is slightly elevated.

## 2019-08-20 NOTE — PROGRESS NOTE BEHAVIORAL HEALTH - SUMMARY
59 y/o male with long psych hx, discharged from hospital on 8/5/19 for suicidal ideations. patient a resident at [facility]. patient with hx of HTN, schizophrenia, depression. patient denies hearing voices. patient denies any drugs or alcohol usage. patient states he wants to kill himself by slicing his wrists. Patient compliant with his medications. patient is upset over his mother death 3 years ago.    Upon evaluation patient is alert, calm, and cooperative. Thought process is linear no perceptual disturbances. Denies suicidal and homicidal ideation, symptoms of psychosis, and agustín. Physical exam demonstrates gynecomastia. Patient requires further evaluation and stabilization.     Primary Dx Chronic schizoaffective disorder with acute exacerbation  - Continue medication regimen    HTN  - Losartan 100mg po daily  - Amlodipine 5mg po daily     Constipation   - Senna 1 tablet every 12 hours prn

## 2019-08-20 NOTE — PROGRESS NOTE BEHAVIORAL HEALTH - CASE SUMMARY
The patient attended today’s treatment team meeting participated in tx and discharge planning and signed the attendance sheet.  Pt is less depressed. SI persists but pt denies suicidal plans or intentions. Pt is willing to start planning for discharge back to his residence.

## 2019-08-21 PROCEDURE — 99231 SBSQ HOSP IP/OBS SF/LOW 25: CPT

## 2019-08-21 RX ORDER — QUETIAPINE FUMARATE 200 MG/1
400 TABLET, FILM COATED ORAL AT BEDTIME
Refills: 0 | Status: DISCONTINUED | OUTPATIENT
Start: 2019-08-21 | End: 2019-09-06

## 2019-08-21 RX ORDER — AMLODIPINE BESYLATE 2.5 MG/1
10 TABLET ORAL AT BEDTIME
Refills: 0 | Status: DISCONTINUED | OUTPATIENT
Start: 2019-08-21 | End: 2019-09-06

## 2019-08-21 RX ADMIN — Medication 15 MILLIGRAM(S): at 20:07

## 2019-08-21 RX ADMIN — Medication 1 PATCH: at 11:54

## 2019-08-21 RX ADMIN — QUETIAPINE FUMARATE 50 MILLIGRAM(S): 200 TABLET, FILM COATED ORAL at 22:24

## 2019-08-21 RX ADMIN — LOSARTAN POTASSIUM 100 MILLIGRAM(S): 100 TABLET, FILM COATED ORAL at 08:22

## 2019-08-21 RX ADMIN — Medication 100 MILLIGRAM(S): at 08:22

## 2019-08-21 RX ADMIN — Medication 100 MILLIGRAM(S): at 19:01

## 2019-08-21 RX ADMIN — Medication 81 MILLIGRAM(S): at 19:01

## 2019-08-21 RX ADMIN — AMLODIPINE BESYLATE 5 MILLIGRAM(S): 2.5 TABLET ORAL at 08:22

## 2019-08-21 RX ADMIN — QUETIAPINE FUMARATE 200 MILLIGRAM(S): 200 TABLET, FILM COATED ORAL at 20:08

## 2019-08-21 RX ADMIN — Medication 15 MILLIGRAM(S): at 08:22

## 2019-08-21 RX ADMIN — DIVALPROEX SODIUM 500 MILLIGRAM(S): 500 TABLET, DELAYED RELEASE ORAL at 08:22

## 2019-08-21 RX ADMIN — DIVALPROEX SODIUM 500 MILLIGRAM(S): 500 TABLET, DELAYED RELEASE ORAL at 20:07

## 2019-08-21 RX ADMIN — AMLODIPINE BESYLATE 10 MILLIGRAM(S): 2.5 TABLET ORAL at 20:08

## 2019-08-21 RX ADMIN — Medication 1 PATCH: at 08:22

## 2019-08-21 RX ADMIN — HALOPERIDOL DECANOATE 5 MILLIGRAM(S): 100 INJECTION INTRAMUSCULAR at 20:07

## 2019-08-21 RX ADMIN — Medication 100 MILLIGRAM(S): at 20:07

## 2019-08-21 RX ADMIN — Medication 1 PATCH: at 20:07

## 2019-08-21 NOTE — CHART NOTE - NSCHARTNOTEFT_GEN_A_CORE
Pt. is presenting as less depressed.  He continues to report some passive suicidal ideation but denies any plan or intent to harm himself.  Pt. is leaving his room more and states he likes to stand outside of his room to observe but does not like to engage in unit. activities.  He presents as alert and oriented on all spheres and denies any A/V hallucinations.  Upon discharge, pt. will return to his residence at [facility].  Pt. is not yet stable for discharge at this time.

## 2019-08-21 NOTE — PROGRESS NOTE BEHAVIORAL HEALTH - SUMMARY
59 y/o male with long psych hx, discharged from hospital on 8/5/19 for suicidal ideations. patient a resident at [facility]. patient with hx of HTN, schizophrenia, depression. patient denies hearing voices. patient denies any drugs or alcohol usage. patient states he wants to kill himself by slicing his wrists. Patient compliant with his medications. patient is upset over his mother death 3 years ago.    Upon evaluation patient is alert, calm, and cooperative. Thought process is linear no perceptual disturbances. Denies suicidal and homicidal ideation, symptoms of psychosis, and agustín. Physical exam demonstrates gynecomastia. Patient requires further evaluation and stabilization.     Primary Dx Chronic schizoaffective disorder with acute exacerbation  - Continue medication regimen    HTN  - Losartan 100mg po daily  - Amlodipine 5mg po daily     Constipation   - Senna 1 tablet every 12 hours prn

## 2019-08-21 NOTE — PROGRESS NOTE BEHAVIORAL HEALTH - NSBHCHARTREVIEWVS_PSY_A_CORE FT
T(C): 36.2 (08-21-19 @ 06:13), Max: 36.2 (08-21-19 @ 06:13)  HR: 55 (08-21-19 @ 06:13) (55 - 82)  BP: 165/97 (08-21-19 @ 06:13) (153/96 - 165/97)  RR: 20 (08-21-19 @ 06:13) (18 - 20)  SpO2: --

## 2019-08-21 NOTE — PROGRESS NOTE BEHAVIORAL HEALTH - NSBHFUPINTERVALHXFT_PSY_A_CORE
Patient was seen, evaluated, and chart was reviewed. No acute events overnight.  Patient is alert,  calm, cooperative, compliant with treatment. Pt is less depressed. SI persists but pt denies suicidal plans or intentions. Pt is willing to start planning for discharge back to his residence. Currently has no thoughts of hurting himself.  Patient is in good behavioral control. He has been more visible on the unit however he does not participate in groups. He states he is not interested in groups and he likes standing in the hallways. Endorses good appetite and sleep. Patient is tolerating medications. Denies A/V hallucinations. Continue current medication regimen. Pt continues to present c flat affect, poverty of speech and poverty of thought content, and diminished initiative and diminished interest in his surroundings. Pt requested that staff assists him to take a shower today.

## 2019-08-22 PROCEDURE — 99231 SBSQ HOSP IP/OBS SF/LOW 25: CPT | Mod: GC

## 2019-08-22 RX ORDER — HYDROXYZINE HCL 10 MG
100 TABLET ORAL THREE TIMES A DAY
Refills: 0 | Status: DISCONTINUED | OUTPATIENT
Start: 2019-08-22 | End: 2019-08-30

## 2019-08-22 RX ADMIN — AMLODIPINE BESYLATE 10 MILLIGRAM(S): 2.5 TABLET ORAL at 20:06

## 2019-08-22 RX ADMIN — Medication 1 PATCH: at 08:26

## 2019-08-22 RX ADMIN — QUETIAPINE FUMARATE 400 MILLIGRAM(S): 200 TABLET, FILM COATED ORAL at 20:07

## 2019-08-22 RX ADMIN — DIVALPROEX SODIUM 500 MILLIGRAM(S): 500 TABLET, DELAYED RELEASE ORAL at 20:06

## 2019-08-22 RX ADMIN — Medication 15 MILLIGRAM(S): at 08:25

## 2019-08-22 RX ADMIN — HALOPERIDOL DECANOATE 5 MILLIGRAM(S): 100 INJECTION INTRAMUSCULAR at 20:06

## 2019-08-22 RX ADMIN — Medication 1 PATCH: at 08:25

## 2019-08-22 RX ADMIN — Medication 100 MILLIGRAM(S): at 13:07

## 2019-08-22 RX ADMIN — Medication 81 MILLIGRAM(S): at 08:26

## 2019-08-22 RX ADMIN — Medication 15 MILLIGRAM(S): at 20:06

## 2019-08-22 RX ADMIN — Medication 100 MILLIGRAM(S): at 08:26

## 2019-08-22 RX ADMIN — Medication 100 MILLIGRAM(S): at 20:07

## 2019-08-22 RX ADMIN — Medication 1 PATCH: at 20:07

## 2019-08-22 RX ADMIN — DIVALPROEX SODIUM 500 MILLIGRAM(S): 500 TABLET, DELAYED RELEASE ORAL at 08:25

## 2019-08-22 RX ADMIN — LOSARTAN POTASSIUM 100 MILLIGRAM(S): 100 TABLET, FILM COATED ORAL at 08:25

## 2019-08-22 NOTE — PROGRESS NOTE BEHAVIORAL HEALTH - NSBHCHARTREVIEWLAB_PSY_A_CORE FT
Prolactin 24.8 [4.1-18.4 ng/mL]
Prolactin 24.8 [4.1-18.4]
Prolactin 24.8 [4.1-18.4]
Prolactin 24.8 [4.1-18.4 ng/mL]
Prolactin 24.8 [4.1-18.4]

## 2019-08-22 NOTE — PROGRESS NOTE BEHAVIORAL HEALTH - CASE SUMMARY
Pt admits to si, but denies any suicidal plans or intentions. pt willing to consider dc next week. Atarax prns ordered fro anxiety / insomnia.

## 2019-08-22 NOTE — PROGRESS NOTE BEHAVIORAL HEALTH - NSBHCHARTREVIEWVS_PSY_A_CORE FT
Vital Signs Last 24 Hrs  T(C): 37.1 (22 Aug 2019 06:22), Max: 37.1 (22 Aug 2019 06:22)  T(F): 98.7 (22 Aug 2019 06:22), Max: 98.7 (22 Aug 2019 06:22)  HR: 93 (22 Aug 2019 06:22) (81 - 93)  BP: 99/69 (22 Aug 2019 06:22) (99/69 - 150/96)  BP(mean): --  RR: 18 (22 Aug 2019 06:22) (18 - 18)  SpO2: ---

## 2019-08-22 NOTE — PROGRESS NOTE BEHAVIORAL HEALTH - NSBHFUPINTERVALHXFT_PSY_A_CORE
Patient was seen, evaluated, and chart was reviewed. No acute events overnight.  Patient is alert,  calm, cooperative, compliant with treatment. Complaints of poor sleep last night and did not have breakfast this morning. SI persists but pt states he has a plan to cut his wrist but no intention. Patient contracts to safety. Patient is in good behavioral control. He is isolative and he does not participate in groups. Patient is tolerating medications. Denies A/V hallucinations. Continue current medication regimen. Pt continues to present c flat affect, poverty of speech and poverty of thought content, and diminished initiative and diminished interest in his surroundings.

## 2019-08-22 NOTE — PROGRESS NOTE BEHAVIORAL HEALTH - SUMMARY
59 y/o male with long psych hx, discharged from hospital on 8/5/19 for suicidal ideations. patient a resident at [facility]. patient with hx of HTN, schizophrenia, depression. patient denies hearing voices. patient denies any drugs or alcohol usage. patient states he wants to kill himself by slicing his wrists. Patient compliant with his medications. patient is upset over his mother death 3 years ago.    Upon evaluation patient is alert, calm, and cooperative. Thought process is linear no perceptual disturbances. Denies suicidal and homicidal ideation, symptoms of psychosis, and agustín. Physical exam demonstrates gynecomastia. Patient requires further evaluation and stabilization.     Primary Dx Chronic schizoaffective disorder with acute exacerbation  - Continue medication regimen    HTN  - Losartan 100mg po daily  - Amlodipine 5mg po daily     Constipation   - Senna 1 tablet every 12 hours prn

## 2019-08-23 PROCEDURE — 99231 SBSQ HOSP IP/OBS SF/LOW 25: CPT | Mod: GC

## 2019-08-23 RX ADMIN — Medication 1 PATCH: at 08:25

## 2019-08-23 RX ADMIN — HALOPERIDOL DECANOATE 5 MILLIGRAM(S): 100 INJECTION INTRAMUSCULAR at 20:18

## 2019-08-23 RX ADMIN — DIVALPROEX SODIUM 500 MILLIGRAM(S): 500 TABLET, DELAYED RELEASE ORAL at 20:19

## 2019-08-23 RX ADMIN — DIVALPROEX SODIUM 500 MILLIGRAM(S): 500 TABLET, DELAYED RELEASE ORAL at 08:25

## 2019-08-23 RX ADMIN — Medication 15 MILLIGRAM(S): at 08:26

## 2019-08-23 RX ADMIN — AMLODIPINE BESYLATE 10 MILLIGRAM(S): 2.5 TABLET ORAL at 20:19

## 2019-08-23 RX ADMIN — Medication 100 MILLIGRAM(S): at 20:20

## 2019-08-23 RX ADMIN — QUETIAPINE FUMARATE 50 MILLIGRAM(S): 200 TABLET, FILM COATED ORAL at 23:33

## 2019-08-23 RX ADMIN — LOSARTAN POTASSIUM 100 MILLIGRAM(S): 100 TABLET, FILM COATED ORAL at 08:25

## 2019-08-23 RX ADMIN — Medication 100 MILLIGRAM(S): at 08:25

## 2019-08-23 RX ADMIN — QUETIAPINE FUMARATE 400 MILLIGRAM(S): 200 TABLET, FILM COATED ORAL at 20:18

## 2019-08-23 RX ADMIN — Medication 81 MILLIGRAM(S): at 08:26

## 2019-08-23 RX ADMIN — Medication 100 MILLIGRAM(S): at 20:18

## 2019-08-23 RX ADMIN — Medication 1 PATCH: at 21:08

## 2019-08-23 RX ADMIN — Medication 100 MILLIGRAM(S): at 13:46

## 2019-08-23 RX ADMIN — Medication 15 MILLIGRAM(S): at 20:19

## 2019-08-23 NOTE — PROGRESS NOTE BEHAVIORAL HEALTH - MODIFICATIONS
Patient was seen, and evaluated, - I concur c above note
Patient was seen, and evaluated, chart was reviewed and case was discussed - I concur c above note
Patient was seen, and evaluated, - I concur c above note
Patient was seen, and evaluated, chart was reviewed and case was discussed - I concur c above note
Patient was seen, and evaluated, chart was reviewed and case was discussed - I concur c above note
Patient was seen, and evaluated, - I concur c above note

## 2019-08-23 NOTE — PROGRESS NOTE BEHAVIORAL HEALTH - SUMMARY
57 y/o male with long psych hx, discharged from hospital on 8/5/19 for suicidal ideations. patient a resident at [facility]. patient with hx of HTN, schizophrenia, depression. patient denies hearing voices. patient denies any drugs or alcohol usage. patient states he wants to kill himself by slicing his wrists. Patient compliant with his medications. patient is upset over his mother death 3 years ago.    Upon evaluation patient is alert, calm, and cooperative. Thought process is linear no perceptual disturbances. Denies suicidal and homicidal ideation, symptoms of psychosis, and agustín. Patient requires further evaluation and stabilization.     Primary Dx Chronic schizoaffective disorder with acute exacerbation  - Continue medication regimen    HTN  - Losartan 100mg po daily  - Amlodipine 5mg po daily     Constipation   - Senna 1 tablet every 12 hours prn

## 2019-08-23 NOTE — PROGRESS NOTE BEHAVIORAL HEALTH - NSBHFUPINTERVALHXFT_PSY_A_CORE
Patient was seen, evaluated, chart reviewed. On evaluation, patient is calm, cooperative, no acute events overnight. Patient seen in dining room and walked with writer to his room. Patient able to engage in conversation stating the foods he had for breakfast. . Patient reports his mood is "good."  SI persist but are less frequent. States if he has thought of hurting himself, he will inform staff. Patient contracts to safety. Denies A/V hallucinations. He has not attended groups. He is compliant with medication, denies negative side effects. States last bowel movement was 3 days ago. He is isolative and he does not participate in groups. Patient is tolerating medications. Denies A/V hallucinations. Continue current medication regimen. Pt continues to present c flat affect, poverty of speech and poverty of thought content, and diminished initiative and diminished interest in his surroundings.  As per nurse patient was brought out of his room due to fire drill, patient cooperated and complied to exiting room. Patient was seen, evaluated, chart reviewed. On evaluation, patient is calm, cooperative, no acute events overnight. Patient seen in dining room and walked with writer to his room. Patient able to engage in conversation stating the foods he had for breakfast. Patient reports his mood is "good."  SI persist but are less frequent and states "feels like they are almost gone." States if he has thought of hurting himself, he will inform staff. Patient contracts to safety. Denies A/V hallucinations. He has not attended groups. He is compliant with medication, denies negative side effects. States last bowel movement was 3 days ago. Patient is tolerating medications. Continue current medication regimen. Discharge planning discussed with patient, states he feels comfortable going home next week. Pt continues to present c flat affect, poverty of speech and poverty of thought content, and diminished initiative and diminished interest in his surroundings.  As per nurse, patient was brought out of his room due to fire drill, patient cooperated and complied to exiting room. Patient was seen, evaluated, chart reviewed. On evaluation, patient is calm, cooperative, no acute events overnight. Patient seen in dining room and walked with writer to his room. Patient able to engage in conversation stating the food he had for breakfast. Patient reports his mood is "good."  SI persist but are less frequent and states "feels like they are almost gone." States if he has thought of hurting himself, he will inform staff. Patient contracts to safety. Denies A/V hallucinations. He has not attended groups. He is compliant with medication, denies negative side effects.  Patient is tolerating medications. Continue current medication regimen. Discharge planning discussed with patient, states he feels comfortable going home next week. Pt continues to present c flat affect, poverty of speech and poverty of thought content, and diminished initiative and diminished interest in his surroundings.

## 2019-08-23 NOTE — PROGRESS NOTE BEHAVIORAL HEALTH - CASE SUMMARY
Pt is less preoccupied c Si. Pt denies and present sno evidence for suicidal or homicidal plans or intentions. Pt agreeable to move forward c discharge plans for next week.

## 2019-08-23 NOTE — PROGRESS NOTE BEHAVIORAL HEALTH - NSBHCHARTREVIEWVS_PSY_A_CORE FT
Vital Signs Last 24 Hrs  T(C): 35.6 (23 Aug 2019 08:44), Max: 37.2 (22 Aug 2019 18:14)  T(F): 96.1 (23 Aug 2019 08:44), Max: 98.9 (22 Aug 2019 18:14)  HR: 85 (23 Aug 2019 08:44) (62 - 85)  BP: 122/84 (23 Aug 2019 08:44) (111/68 - 132/84)  BP(mean): --  RR: 18 (23 Aug 2019 08:44) (16 - 18)  SpO2: --

## 2019-08-23 NOTE — CHART NOTE - NSCHARTNOTEFT_GEN_A_CORE
Pt. reports feeling better.  He reports some remaining suicidal ideation but states it is "almost gone".  Pt. was able to contract for safety on the unit and denies any plan or intent to harm himself.  He also denies any A/V hallucinations.  Pt. will likely be discharged next week and will return to his residence at [facility].  Pt. is not yet ready for discharge on this date.

## 2019-08-24 RX ADMIN — Medication 81 MILLIGRAM(S): at 08:06

## 2019-08-24 RX ADMIN — QUETIAPINE FUMARATE 50 MILLIGRAM(S): 200 TABLET, FILM COATED ORAL at 18:32

## 2019-08-24 RX ADMIN — LOSARTAN POTASSIUM 100 MILLIGRAM(S): 100 TABLET, FILM COATED ORAL at 08:06

## 2019-08-24 RX ADMIN — HALOPERIDOL DECANOATE 5 MILLIGRAM(S): 100 INJECTION INTRAMUSCULAR at 20:11

## 2019-08-24 RX ADMIN — AMLODIPINE BESYLATE 10 MILLIGRAM(S): 2.5 TABLET ORAL at 20:11

## 2019-08-24 RX ADMIN — DIVALPROEX SODIUM 500 MILLIGRAM(S): 500 TABLET, DELAYED RELEASE ORAL at 08:06

## 2019-08-24 RX ADMIN — QUETIAPINE FUMARATE 400 MILLIGRAM(S): 200 TABLET, FILM COATED ORAL at 20:11

## 2019-08-24 RX ADMIN — Medication 1 PATCH: at 08:06

## 2019-08-24 RX ADMIN — Medication 1 PATCH: at 16:54

## 2019-08-24 RX ADMIN — Medication 15 MILLIGRAM(S): at 20:11

## 2019-08-24 RX ADMIN — Medication 100 MILLIGRAM(S): at 08:06

## 2019-08-24 RX ADMIN — Medication 1 PATCH: at 08:07

## 2019-08-24 RX ADMIN — Medication 100 MILLIGRAM(S): at 20:11

## 2019-08-24 RX ADMIN — Medication 15 MILLIGRAM(S): at 08:06

## 2019-08-24 RX ADMIN — DIVALPROEX SODIUM 500 MILLIGRAM(S): 500 TABLET, DELAYED RELEASE ORAL at 20:11

## 2019-08-25 RX ADMIN — QUETIAPINE FUMARATE 400 MILLIGRAM(S): 200 TABLET, FILM COATED ORAL at 20:04

## 2019-08-25 RX ADMIN — DIVALPROEX SODIUM 500 MILLIGRAM(S): 500 TABLET, DELAYED RELEASE ORAL at 20:04

## 2019-08-25 RX ADMIN — Medication 100 MILLIGRAM(S): at 13:18

## 2019-08-25 RX ADMIN — Medication 1 PATCH: at 10:04

## 2019-08-25 RX ADMIN — AMLODIPINE BESYLATE 10 MILLIGRAM(S): 2.5 TABLET ORAL at 20:04

## 2019-08-25 RX ADMIN — Medication 1 PATCH: at 10:03

## 2019-08-25 RX ADMIN — Medication 100 MILLIGRAM(S): at 10:04

## 2019-08-25 RX ADMIN — Medication 15 MILLIGRAM(S): at 20:04

## 2019-08-25 RX ADMIN — Medication 1 PATCH: at 20:05

## 2019-08-25 RX ADMIN — Medication 81 MILLIGRAM(S): at 10:04

## 2019-08-25 RX ADMIN — Medication 15 MILLIGRAM(S): at 10:04

## 2019-08-25 RX ADMIN — LOSARTAN POTASSIUM 100 MILLIGRAM(S): 100 TABLET, FILM COATED ORAL at 10:04

## 2019-08-25 RX ADMIN — Medication 100 MILLIGRAM(S): at 20:04

## 2019-08-25 RX ADMIN — DIVALPROEX SODIUM 500 MILLIGRAM(S): 500 TABLET, DELAYED RELEASE ORAL at 10:04

## 2019-08-25 RX ADMIN — HALOPERIDOL DECANOATE 5 MILLIGRAM(S): 100 INJECTION INTRAMUSCULAR at 20:05

## 2019-08-26 PROCEDURE — 99231 SBSQ HOSP IP/OBS SF/LOW 25: CPT

## 2019-08-26 RX ADMIN — Medication 100 MILLIGRAM(S): at 08:14

## 2019-08-26 RX ADMIN — HALOPERIDOL DECANOATE 5 MILLIGRAM(S): 100 INJECTION INTRAMUSCULAR at 20:36

## 2019-08-26 RX ADMIN — DIVALPROEX SODIUM 500 MILLIGRAM(S): 500 TABLET, DELAYED RELEASE ORAL at 20:36

## 2019-08-26 RX ADMIN — Medication 1 PATCH: at 08:14

## 2019-08-26 RX ADMIN — Medication 100 MILLIGRAM(S): at 13:06

## 2019-08-26 RX ADMIN — QUETIAPINE FUMARATE 400 MILLIGRAM(S): 200 TABLET, FILM COATED ORAL at 20:36

## 2019-08-26 RX ADMIN — Medication 1 PATCH: at 07:29

## 2019-08-26 RX ADMIN — Medication 81 MILLIGRAM(S): at 08:14

## 2019-08-26 RX ADMIN — Medication 100 MILLIGRAM(S): at 20:36

## 2019-08-26 RX ADMIN — Medication 15 MILLIGRAM(S): at 20:37

## 2019-08-26 RX ADMIN — Medication 1 PATCH: at 20:37

## 2019-08-26 RX ADMIN — DIVALPROEX SODIUM 500 MILLIGRAM(S): 500 TABLET, DELAYED RELEASE ORAL at 08:14

## 2019-08-26 RX ADMIN — LOSARTAN POTASSIUM 100 MILLIGRAM(S): 100 TABLET, FILM COATED ORAL at 08:13

## 2019-08-26 RX ADMIN — Medication 15 MILLIGRAM(S): at 08:14

## 2019-08-26 RX ADMIN — Medication 100 MILLIGRAM(S): at 22:17

## 2019-08-26 RX ADMIN — AMLODIPINE BESYLATE 10 MILLIGRAM(S): 2.5 TABLET ORAL at 20:36

## 2019-08-26 NOTE — CHART NOTE - NSCHARTNOTEFT_GEN_A_CORE
Mr. Simon remains on the unit for continued treatment, safety and observation. He was seen by  and treatment team during morning rounds. He remained in bed during morning rounds. Mr. Simon was pleasant and cooperative with treatment team. He continues to report auditory hallucinations stating he "hears some voices" but they are not constant. Mr. Simon denies suicidal and homicidal ideation. Treatment team encouraged Mr. Simon to attend unit groups but he continues to keep to himself.      will continue to meet with patient one on one and with treatment team daily. Mr. Simon will return to [place] when discharged. Patient is not yet psychiatrically stable for discharge.

## 2019-08-27 PROCEDURE — 99231 SBSQ HOSP IP/OBS SF/LOW 25: CPT

## 2019-08-27 RX ADMIN — Medication 1 PATCH: at 11:23

## 2019-08-27 RX ADMIN — Medication 15 MILLIGRAM(S): at 20:28

## 2019-08-27 RX ADMIN — DIVALPROEX SODIUM 500 MILLIGRAM(S): 500 TABLET, DELAYED RELEASE ORAL at 20:28

## 2019-08-27 RX ADMIN — Medication 100 MILLIGRAM(S): at 08:48

## 2019-08-27 RX ADMIN — Medication 1 PATCH: at 16:43

## 2019-08-27 RX ADMIN — AMLODIPINE BESYLATE 10 MILLIGRAM(S): 2.5 TABLET ORAL at 20:28

## 2019-08-27 RX ADMIN — Medication 100 MILLIGRAM(S): at 13:23

## 2019-08-27 RX ADMIN — Medication 1 PATCH: at 08:50

## 2019-08-27 RX ADMIN — Medication 15 MILLIGRAM(S): at 08:50

## 2019-08-27 RX ADMIN — QUETIAPINE FUMARATE 400 MILLIGRAM(S): 200 TABLET, FILM COATED ORAL at 20:33

## 2019-08-27 RX ADMIN — HALOPERIDOL DECANOATE 5 MILLIGRAM(S): 100 INJECTION INTRAMUSCULAR at 20:28

## 2019-08-27 RX ADMIN — Medication 1 PATCH: at 08:49

## 2019-08-27 RX ADMIN — Medication 100 MILLIGRAM(S): at 20:35

## 2019-08-27 RX ADMIN — Medication 100 MILLIGRAM(S): at 20:28

## 2019-08-27 RX ADMIN — DIVALPROEX SODIUM 500 MILLIGRAM(S): 500 TABLET, DELAYED RELEASE ORAL at 08:48

## 2019-08-27 RX ADMIN — LOSARTAN POTASSIUM 100 MILLIGRAM(S): 100 TABLET, FILM COATED ORAL at 08:48

## 2019-08-27 RX ADMIN — Medication 81 MILLIGRAM(S): at 08:49

## 2019-08-28 PROCEDURE — 99231 SBSQ HOSP IP/OBS SF/LOW 25: CPT

## 2019-08-28 RX ADMIN — AMLODIPINE BESYLATE 10 MILLIGRAM(S): 2.5 TABLET ORAL at 20:18

## 2019-08-28 RX ADMIN — Medication 100 MILLIGRAM(S): at 20:18

## 2019-08-28 RX ADMIN — Medication 100 MILLIGRAM(S): at 13:49

## 2019-08-28 RX ADMIN — DIVALPROEX SODIUM 500 MILLIGRAM(S): 500 TABLET, DELAYED RELEASE ORAL at 20:18

## 2019-08-28 RX ADMIN — Medication 15 MILLIGRAM(S): at 20:18

## 2019-08-28 RX ADMIN — Medication 100 MILLIGRAM(S): at 08:12

## 2019-08-28 RX ADMIN — Medication 1 PATCH: at 08:12

## 2019-08-28 RX ADMIN — HALOPERIDOL DECANOATE 5 MILLIGRAM(S): 100 INJECTION INTRAMUSCULAR at 20:18

## 2019-08-28 RX ADMIN — DIVALPROEX SODIUM 500 MILLIGRAM(S): 500 TABLET, DELAYED RELEASE ORAL at 08:12

## 2019-08-28 RX ADMIN — Medication 81 MILLIGRAM(S): at 08:12

## 2019-08-28 RX ADMIN — Medication 1 PATCH: at 07:18

## 2019-08-28 RX ADMIN — Medication 100 MILLIGRAM(S): at 21:58

## 2019-08-28 RX ADMIN — LOSARTAN POTASSIUM 100 MILLIGRAM(S): 100 TABLET, FILM COATED ORAL at 08:12

## 2019-08-28 RX ADMIN — Medication 15 MILLIGRAM(S): at 08:12

## 2019-08-28 RX ADMIN — QUETIAPINE FUMARATE 400 MILLIGRAM(S): 200 TABLET, FILM COATED ORAL at 20:19

## 2019-08-28 NOTE — CHART NOTE - NSCHARTNOTEFT_GEN_A_CORE
Mr. Simon remains on the unit for continued treatment, safety and observation. He was seen by  and treatment team during morning rounds. He remains isolative to his room and was in bed during morning rounds. Mr. Simon was cooperative with treatment team and did not express any concerns. He reports he showered and is looking forward to returning home. He was able to sleep well after receiving PRN's the night prior. Mr. Simon denies suicidal and homicidal ideation. Treatment team encouraged Mr. Simon to attend unit groups and be more visible on the unit.      will continue to meet with patient one on one and with treatment team daily. Mr. Simon will return to [place] when discharged. Discharge anticipated for next week contingent on patient's progress and response to treatment. Patient is not yet psychiatrically stable for discharge.

## 2019-08-29 LAB — VALPROATE SERPL-MCNC: 70 UG/ML — SIGNIFICANT CHANGE UP (ref 50–100)

## 2019-08-29 PROCEDURE — 99231 SBSQ HOSP IP/OBS SF/LOW 25: CPT

## 2019-08-29 RX ADMIN — QUETIAPINE FUMARATE 400 MILLIGRAM(S): 200 TABLET, FILM COATED ORAL at 20:08

## 2019-08-29 RX ADMIN — DIVALPROEX SODIUM 500 MILLIGRAM(S): 500 TABLET, DELAYED RELEASE ORAL at 20:08

## 2019-08-29 RX ADMIN — Medication 1 PATCH: at 08:39

## 2019-08-29 RX ADMIN — Medication 15 MILLIGRAM(S): at 08:38

## 2019-08-29 RX ADMIN — DIVALPROEX SODIUM 500 MILLIGRAM(S): 500 TABLET, DELAYED RELEASE ORAL at 08:39

## 2019-08-29 RX ADMIN — Medication 100 MILLIGRAM(S): at 08:38

## 2019-08-29 RX ADMIN — Medication 1 PATCH: at 11:22

## 2019-08-29 RX ADMIN — AMLODIPINE BESYLATE 10 MILLIGRAM(S): 2.5 TABLET ORAL at 20:08

## 2019-08-29 RX ADMIN — Medication 100 MILLIGRAM(S): at 20:08

## 2019-08-29 RX ADMIN — LOSARTAN POTASSIUM 100 MILLIGRAM(S): 100 TABLET, FILM COATED ORAL at 08:38

## 2019-08-29 RX ADMIN — Medication 15 MILLIGRAM(S): at 20:08

## 2019-08-29 RX ADMIN — Medication 100 MILLIGRAM(S): at 13:19

## 2019-08-29 RX ADMIN — HALOPERIDOL DECANOATE 5 MILLIGRAM(S): 100 INJECTION INTRAMUSCULAR at 20:08

## 2019-08-29 RX ADMIN — Medication 81 MILLIGRAM(S): at 08:38

## 2019-08-30 PROCEDURE — 99231 SBSQ HOSP IP/OBS SF/LOW 25: CPT

## 2019-08-30 RX ORDER — HYDROXYZINE HCL 10 MG
50 TABLET ORAL ONCE
Refills: 0 | Status: COMPLETED | OUTPATIENT
Start: 2019-08-30 | End: 2019-08-30

## 2019-08-30 RX ADMIN — QUETIAPINE FUMARATE 400 MILLIGRAM(S): 200 TABLET, FILM COATED ORAL at 20:05

## 2019-08-30 RX ADMIN — Medication 100 MILLIGRAM(S): at 08:24

## 2019-08-30 RX ADMIN — Medication 100 MILLIGRAM(S): at 14:32

## 2019-08-30 RX ADMIN — Medication 1 PATCH: at 08:24

## 2019-08-30 RX ADMIN — Medication 1 PATCH: at 08:25

## 2019-08-30 RX ADMIN — Medication 15 MILLIGRAM(S): at 20:05

## 2019-08-30 RX ADMIN — DIVALPROEX SODIUM 500 MILLIGRAM(S): 500 TABLET, DELAYED RELEASE ORAL at 20:05

## 2019-08-30 RX ADMIN — Medication 81 MILLIGRAM(S): at 08:24

## 2019-08-30 RX ADMIN — LOSARTAN POTASSIUM 100 MILLIGRAM(S): 100 TABLET, FILM COATED ORAL at 08:24

## 2019-08-30 RX ADMIN — Medication 50 MILLIGRAM(S): at 22:21

## 2019-08-30 RX ADMIN — AMLODIPINE BESYLATE 10 MILLIGRAM(S): 2.5 TABLET ORAL at 20:05

## 2019-08-30 RX ADMIN — HALOPERIDOL DECANOATE 5 MILLIGRAM(S): 100 INJECTION INTRAMUSCULAR at 20:05

## 2019-08-30 RX ADMIN — Medication 100 MILLIGRAM(S): at 20:05

## 2019-08-30 RX ADMIN — DIVALPROEX SODIUM 500 MILLIGRAM(S): 500 TABLET, DELAYED RELEASE ORAL at 08:24

## 2019-08-30 RX ADMIN — Medication 15 MILLIGRAM(S): at 08:24

## 2019-08-30 NOTE — CHART NOTE - NSCHARTNOTEFT_GEN_A_CORE
Mr. Simon remains on the unit for continued treatment, safety and observation. He was seen by  and treatment team during morning rounds. He was seen in team meeting. He engaged in interview with treatment team. He reports "not feeling too well" and reports not wanting to attend another hospital, rather than return to his residence. Mr. Simon    He remains isolative to his room and was in bed during morning rounds. Mr. Simon was cooperative with treatment team and did not express any concerns. He reports he showered and is looking forward to returning home. He was able to sleep well after receiving PRN's the night prior. Mr. Simon denies suicidal and homicidal ideation. Treatment team encouraged Mr. Simon to attend unit groups and be more visible on the unit.      will continue to meet with patient one on one and with treatment team daily. Mr. Simon will return to [place] when discharged. Discharge anticipated for next week contingent on patient's progress and response to treatment. Patient is not yet psychiatrically stable for discharge. Mr. Simon remains on the unit for continued treatment, safety and observation. He was seen by  and treatment team during morning rounds. He was seen in team meeting. He engaged in interview with treatment team. He reports "not feeling too well" and reports wanting to attend another hospital, rather than return to his residence. Mr. Simon reports reluctancy to discharge. He contracts for safety on the unit but does not feel he is ready to return home. He reports poor sleep, although he continues to sleep many hours during the day. Mr. Simon has been more visible on the unit. He is encouraged to attend unit groups. He prefers to remain in his hospital clothes although he reports he has his own clothing.      will continue to meet with patient one on one and with treatment team daily. Mr. Simon will return to [place] when discharged. Discharge anticipated for next week contingent on patient's progress and response to treatment. Patient is not yet psychiatrically stable for discharge.

## 2019-08-31 RX ORDER — HYDROXYZINE HCL 10 MG
50 TABLET ORAL AT BEDTIME
Refills: 0 | Status: DISCONTINUED | OUTPATIENT
Start: 2019-08-31 | End: 2019-09-06

## 2019-08-31 RX ADMIN — LOSARTAN POTASSIUM 100 MILLIGRAM(S): 100 TABLET, FILM COATED ORAL at 08:46

## 2019-08-31 RX ADMIN — Medication 15 MILLIGRAM(S): at 08:46

## 2019-08-31 RX ADMIN — Medication 100 MILLIGRAM(S): at 08:46

## 2019-08-31 RX ADMIN — DIVALPROEX SODIUM 500 MILLIGRAM(S): 500 TABLET, DELAYED RELEASE ORAL at 20:06

## 2019-08-31 RX ADMIN — Medication 1 PATCH: at 12:11

## 2019-08-31 RX ADMIN — Medication 15 MILLIGRAM(S): at 20:07

## 2019-08-31 RX ADMIN — HALOPERIDOL DECANOATE 5 MILLIGRAM(S): 100 INJECTION INTRAMUSCULAR at 20:06

## 2019-08-31 RX ADMIN — AMLODIPINE BESYLATE 10 MILLIGRAM(S): 2.5 TABLET ORAL at 20:07

## 2019-08-31 RX ADMIN — Medication 81 MILLIGRAM(S): at 08:46

## 2019-08-31 RX ADMIN — QUETIAPINE FUMARATE 400 MILLIGRAM(S): 200 TABLET, FILM COATED ORAL at 20:06

## 2019-08-31 RX ADMIN — Medication 1 PATCH: at 18:36

## 2019-08-31 RX ADMIN — Medication 1 PATCH: at 08:46

## 2019-08-31 RX ADMIN — DIVALPROEX SODIUM 500 MILLIGRAM(S): 500 TABLET, DELAYED RELEASE ORAL at 08:46

## 2019-08-31 RX ADMIN — Medication 100 MILLIGRAM(S): at 14:04

## 2019-08-31 RX ADMIN — Medication 100 MILLIGRAM(S): at 20:06

## 2019-09-01 RX ADMIN — Medication 100 MILLIGRAM(S): at 20:45

## 2019-09-01 RX ADMIN — Medication 1 PATCH: at 09:02

## 2019-09-01 RX ADMIN — DIVALPROEX SODIUM 500 MILLIGRAM(S): 500 TABLET, DELAYED RELEASE ORAL at 09:02

## 2019-09-01 RX ADMIN — QUETIAPINE FUMARATE 400 MILLIGRAM(S): 200 TABLET, FILM COATED ORAL at 20:44

## 2019-09-01 RX ADMIN — HALOPERIDOL DECANOATE 5 MILLIGRAM(S): 100 INJECTION INTRAMUSCULAR at 20:45

## 2019-09-01 RX ADMIN — Medication 15 MILLIGRAM(S): at 20:45

## 2019-09-01 RX ADMIN — Medication 100 MILLIGRAM(S): at 13:13

## 2019-09-01 RX ADMIN — Medication 15 MILLIGRAM(S): at 09:02

## 2019-09-01 RX ADMIN — Medication 100 MILLIGRAM(S): at 09:02

## 2019-09-01 RX ADMIN — Medication 50 MILLIGRAM(S): at 20:48

## 2019-09-01 RX ADMIN — LOSARTAN POTASSIUM 100 MILLIGRAM(S): 100 TABLET, FILM COATED ORAL at 09:02

## 2019-09-01 RX ADMIN — Medication 1 PATCH: at 16:49

## 2019-09-01 RX ADMIN — DIVALPROEX SODIUM 500 MILLIGRAM(S): 500 TABLET, DELAYED RELEASE ORAL at 20:45

## 2019-09-01 RX ADMIN — AMLODIPINE BESYLATE 10 MILLIGRAM(S): 2.5 TABLET ORAL at 20:45

## 2019-09-01 RX ADMIN — Medication 81 MILLIGRAM(S): at 09:02

## 2019-09-02 RX ADMIN — Medication 81 MILLIGRAM(S): at 08:20

## 2019-09-02 RX ADMIN — AMLODIPINE BESYLATE 10 MILLIGRAM(S): 2.5 TABLET ORAL at 20:11

## 2019-09-02 RX ADMIN — DIVALPROEX SODIUM 500 MILLIGRAM(S): 500 TABLET, DELAYED RELEASE ORAL at 20:11

## 2019-09-02 RX ADMIN — Medication 15 MILLIGRAM(S): at 08:20

## 2019-09-02 RX ADMIN — Medication 1 PATCH: at 08:22

## 2019-09-02 RX ADMIN — Medication 15 MILLIGRAM(S): at 20:11

## 2019-09-02 RX ADMIN — HALOPERIDOL DECANOATE 5 MILLIGRAM(S): 100 INJECTION INTRAMUSCULAR at 20:11

## 2019-09-02 RX ADMIN — Medication 1 PATCH: at 08:20

## 2019-09-02 RX ADMIN — DIVALPROEX SODIUM 500 MILLIGRAM(S): 500 TABLET, DELAYED RELEASE ORAL at 08:20

## 2019-09-02 RX ADMIN — LOSARTAN POTASSIUM 100 MILLIGRAM(S): 100 TABLET, FILM COATED ORAL at 08:19

## 2019-09-02 RX ADMIN — Medication 100 MILLIGRAM(S): at 15:19

## 2019-09-02 RX ADMIN — Medication 100 MILLIGRAM(S): at 08:20

## 2019-09-02 RX ADMIN — Medication 100 MILLIGRAM(S): at 20:11

## 2019-09-02 RX ADMIN — Medication 1 PATCH: at 08:23

## 2019-09-02 RX ADMIN — QUETIAPINE FUMARATE 400 MILLIGRAM(S): 200 TABLET, FILM COATED ORAL at 20:11

## 2019-09-03 PROCEDURE — 99231 SBSQ HOSP IP/OBS SF/LOW 25: CPT

## 2019-09-03 RX ORDER — INFLUENZA VIRUS VACCINE 15; 15; 15; 15 UG/.5ML; UG/.5ML; UG/.5ML; UG/.5ML
0.5 SUSPENSION INTRAMUSCULAR ONCE
Refills: 0 | Status: COMPLETED | OUTPATIENT
Start: 2019-09-03 | End: 2019-09-03

## 2019-09-03 RX ADMIN — HALOPERIDOL DECANOATE 5 MILLIGRAM(S): 100 INJECTION INTRAMUSCULAR at 20:03

## 2019-09-03 RX ADMIN — Medication 100 MILLIGRAM(S): at 14:06

## 2019-09-03 RX ADMIN — LOSARTAN POTASSIUM 100 MILLIGRAM(S): 100 TABLET, FILM COATED ORAL at 08:40

## 2019-09-03 RX ADMIN — DIVALPROEX SODIUM 500 MILLIGRAM(S): 500 TABLET, DELAYED RELEASE ORAL at 20:03

## 2019-09-03 RX ADMIN — Medication 100 MILLIGRAM(S): at 20:03

## 2019-09-03 RX ADMIN — Medication 1 PATCH: at 08:40

## 2019-09-03 RX ADMIN — Medication 15 MILLIGRAM(S): at 20:03

## 2019-09-03 RX ADMIN — DIVALPROEX SODIUM 500 MILLIGRAM(S): 500 TABLET, DELAYED RELEASE ORAL at 08:40

## 2019-09-03 RX ADMIN — Medication 81 MILLIGRAM(S): at 08:40

## 2019-09-03 RX ADMIN — Medication 50 MILLIGRAM(S): at 20:06

## 2019-09-03 RX ADMIN — AMLODIPINE BESYLATE 10 MILLIGRAM(S): 2.5 TABLET ORAL at 20:03

## 2019-09-03 RX ADMIN — Medication 1 PATCH: at 20:03

## 2019-09-03 RX ADMIN — QUETIAPINE FUMARATE 400 MILLIGRAM(S): 200 TABLET, FILM COATED ORAL at 20:03

## 2019-09-03 RX ADMIN — Medication 1 PATCH: at 08:41

## 2019-09-03 RX ADMIN — Medication 100 MILLIGRAM(S): at 08:40

## 2019-09-03 RX ADMIN — Medication 15 MILLIGRAM(S): at 08:40

## 2019-09-03 NOTE — PROGRESS NOTE BEHAVIORAL HEALTH - ESTIMATED DISCHARGE DATE
05-Sep-2019
26-Aug-2019
31-Aug-2019
25-Aug-2019
31-Aug-2019
26-Aug-2019
31-Aug-2019
26-Aug-2019
31-Aug-2019

## 2019-09-04 PROCEDURE — 99231 SBSQ HOSP IP/OBS SF/LOW 25: CPT

## 2019-09-04 RX ADMIN — DIVALPROEX SODIUM 500 MILLIGRAM(S): 500 TABLET, DELAYED RELEASE ORAL at 08:28

## 2019-09-04 RX ADMIN — Medication 100 MILLIGRAM(S): at 08:28

## 2019-09-04 RX ADMIN — Medication 100 MILLIGRAM(S): at 13:48

## 2019-09-04 RX ADMIN — AMLODIPINE BESYLATE 10 MILLIGRAM(S): 2.5 TABLET ORAL at 21:08

## 2019-09-04 RX ADMIN — HALOPERIDOL DECANOATE 5 MILLIGRAM(S): 100 INJECTION INTRAMUSCULAR at 21:09

## 2019-09-04 RX ADMIN — Medication 50 MILLIGRAM(S): at 22:21

## 2019-09-04 RX ADMIN — Medication 100 MILLIGRAM(S): at 21:09

## 2019-09-04 RX ADMIN — DIVALPROEX SODIUM 500 MILLIGRAM(S): 500 TABLET, DELAYED RELEASE ORAL at 21:09

## 2019-09-04 RX ADMIN — Medication 15 MILLIGRAM(S): at 08:27

## 2019-09-04 RX ADMIN — LOSARTAN POTASSIUM 100 MILLIGRAM(S): 100 TABLET, FILM COATED ORAL at 08:28

## 2019-09-04 RX ADMIN — Medication 1 PATCH: at 08:28

## 2019-09-04 RX ADMIN — Medication 81 MILLIGRAM(S): at 08:27

## 2019-09-04 RX ADMIN — Medication 15 MILLIGRAM(S): at 21:09

## 2019-09-04 RX ADMIN — QUETIAPINE FUMARATE 400 MILLIGRAM(S): 200 TABLET, FILM COATED ORAL at 21:08

## 2019-09-04 RX ADMIN — Medication 1 PATCH: at 21:12

## 2019-09-04 NOTE — CHART NOTE - NSCHARTNOTEFT_GEN_A_CORE
Pt. is presenting with improved mood.  He is denying and suicidal or homicidal ideation or any A/V hallucinations.  Pt. is scheduled for discharge on 9/6.  He will return to his residence at [facility] where he also receives mental health services.  Writer has given the attending psychiatrist the required documentation to complete in order for pt. to return to the residence.  Writer will submit the documentation as soon as the doctor completes it.  Pt. presents as stable for discharge on 9/6.

## 2019-09-04 NOTE — PROGRESS NOTE ADULT - PROBLEM SELECTOR PROBLEM 2
Essential hypertension
Acute exacerbation of subchronic schizoaffective schizophrenia
Essential hypertension

## 2019-09-04 NOTE — PROGRESS NOTE ADULT - PROBLEM SELECTOR PLAN 2
bp stable on tx
bp stable cont curretn tx
bp stable will cont to follow on tx
bp stasble on tx
bp stsable on curren ttx
continue present treatment as per psych plan as reviewed  Medically stable with no new changes in treatment  will continue to monitor medical status while being treated on psych
increse dose of amlodipine to 10 mg

## 2019-09-05 DIAGNOSIS — K59.00 CONSTIPATION, UNSPECIFIED: ICD-10-CM

## 2019-09-05 DIAGNOSIS — I51.9 HEART DISEASE, UNSPECIFIED: ICD-10-CM

## 2019-09-05 PROCEDURE — 99231 SBSQ HOSP IP/OBS SF/LOW 25: CPT

## 2019-09-05 RX ADMIN — Medication 100 MILLIGRAM(S): at 09:07

## 2019-09-05 RX ADMIN — LOSARTAN POTASSIUM 100 MILLIGRAM(S): 100 TABLET, FILM COATED ORAL at 09:05

## 2019-09-05 RX ADMIN — HALOPERIDOL DECANOATE 5 MILLIGRAM(S): 100 INJECTION INTRAMUSCULAR at 20:59

## 2019-09-05 RX ADMIN — Medication 15 MILLIGRAM(S): at 09:04

## 2019-09-05 RX ADMIN — QUETIAPINE FUMARATE 400 MILLIGRAM(S): 200 TABLET, FILM COATED ORAL at 20:59

## 2019-09-05 RX ADMIN — Medication 1 PATCH: at 09:08

## 2019-09-05 RX ADMIN — Medication 50 MILLIGRAM(S): at 21:06

## 2019-09-05 RX ADMIN — DIVALPROEX SODIUM 500 MILLIGRAM(S): 500 TABLET, DELAYED RELEASE ORAL at 20:58

## 2019-09-05 RX ADMIN — DIVALPROEX SODIUM 500 MILLIGRAM(S): 500 TABLET, DELAYED RELEASE ORAL at 09:04

## 2019-09-05 RX ADMIN — AMLODIPINE BESYLATE 10 MILLIGRAM(S): 2.5 TABLET ORAL at 20:58

## 2019-09-05 RX ADMIN — Medication 81 MILLIGRAM(S): at 09:04

## 2019-09-05 RX ADMIN — Medication 100 MILLIGRAM(S): at 13:09

## 2019-09-05 RX ADMIN — Medication 100 MILLIGRAM(S): at 20:59

## 2019-09-05 RX ADMIN — Medication 15 MILLIGRAM(S): at 20:58

## 2019-09-05 RX ADMIN — Medication 1 PATCH: at 09:05

## 2019-09-05 NOTE — PROGRESS NOTE BEHAVIORAL HEALTH - ESTIMATED INTELLIGENCE
Average
Below Average
Below Average
Average
Below Average
Average
Below Average
Below Average
Average
Below Average
Average
Average
Below Average

## 2019-09-05 NOTE — PROGRESS NOTE BEHAVIORAL HEALTH - NSBHLEGALSTATUS_PSY_A_CORE
solids
9.39 (Emergency)
9.13 (Voluntary)
9.39 (Emergency)
9.13 (Voluntary)
9.39 (Emergency)
9.39 (Emergency)
9.13 (Voluntary)
9.39 (Emergency)

## 2019-09-05 NOTE — PROGRESS NOTE BEHAVIORAL HEALTH - NS ED BHA MSE SPEECH ARTICULATION
Impaired/Normal
Impaired
Impaired/Normal
Impaired
Normal
Normal
Normal/Impaired
Impaired
Impaired
Normal/Impaired
Impaired/Normal
Impaired/Normal
Normal/Impaired
Normal
Normal
Normal/Impaired
Normal
Normal
Normal/Impaired
Impaired/Normal

## 2019-09-05 NOTE — PROGRESS NOTE BEHAVIORAL HEALTH - NSBHCONSORIP_PSY_A_CORE
Inpatient Admission...
Consult...
Inpatient Admission...

## 2019-09-05 NOTE — PROGRESS NOTE BEHAVIORAL HEALTH - NSBHFUPVIOL_PSY_A_CORE
none known

## 2019-09-05 NOTE — PROGRESS NOTE BEHAVIORAL HEALTH - NSBHFUPINTERVALCCFT_PSY_A_CORE
" I need help"
" I need help"
"I need help."
Mood is neutral; affect blunted.  Pt remains self isolative, but says he will be ready to return to his residence next week. Denies s/h ideation
Mood is neutral; no s/h ideation or overt psychosis. Aware of, and in agreement with plan to return to residence tomorrow
Pt is isolative but without c/o. He remains in agreement with planned return to his residence next week.  No s/h ideation or overt delusions.  Making no demands to stay in hospital
Pt is isolative, and resistant to engaging in any unit activities. He is not overtly psychotic, and his mood is neutral.  He denies wanting to harm himself. He states he should be ready to return to his residence "soon".
Pt is more verbal and visible on the unit. He is irritable at times, and now clearly states that he wants to go to another hospital, and not back to residence.  No psychosis evident; no s/h ideation.
pt is without c/o at this time. Mood neutral; no s/h ideation. He is in agreement with plan to return to residence this week.  No overt psychosis
pt known from prior admits; just d/c'd this week but presented self to staff with thoughts to cut self with a scissors and kill himself.  Pt with no known actual attempts; no current plans to harm self.  He denies hallucinations, and has no overt delusions. He is requesting a physical exam to feel better, without any clear c/o.
pt states he is not suicidal, but not yet ready for return to residence. Denies hallucinations. He remains isolative
"I need help."
I still want to kill myself. Pt is disorganized and cognitively impaired., with disorganized speech and TP. Pt also get admitted for the same complaints, but never attempted to hurt self.
Mood is neutral; pt remains generally isolative with flat affect and fair ADLs.  Denies s/h ideation ; intermittent aud hallucinations.

## 2019-09-05 NOTE — PROGRESS NOTE BEHAVIORAL HEALTH - BEHAVIOR
Cooperative
Cooperative/Uncooperative
Cooperative
Cooperative/Uncooperative
Cooperative/Uncooperative
Cooperative
Uncooperative/Cooperative
Cooperative
Cooperative
Cooperative/Uncooperative
Cooperative

## 2019-09-05 NOTE — PROGRESS NOTE BEHAVIORAL HEALTH - NSBHLOC_PSY_A_CORE
Alert

## 2019-09-05 NOTE — PROGRESS NOTE BEHAVIORAL HEALTH - NS ED BHA MSE SPEECH VOLUME
Normal
Soft
Normal
Soft
Normal
Soft
Normal
Soft

## 2019-09-05 NOTE — PROGRESS NOTE BEHAVIORAL HEALTH - BODY HABITUS
Overweight/Well nourished
Overweight/Well nourished
Well nourished/Overweight
Overweight/Well nourished
Well nourished/Overweight
Well nourished/Overweight
Overweight/Well nourished

## 2019-09-05 NOTE — PROGRESS NOTE BEHAVIORAL HEALTH - NSBHADMITIPOBS_PSY_A_CORE
Enhanced supervision
Routine observation
Enhanced supervision
Routine observation
Enhanced supervision
Enhanced supervision
Constant observation
Enhanced supervision

## 2019-09-05 NOTE — PROGRESS NOTE BEHAVIORAL HEALTH - THOUGHT ASSOCIATIONS
Normal
Loose
Normal
Loose
Normal
Loose
Loose
Normal

## 2019-09-05 NOTE — PROGRESS NOTE BEHAVIORAL HEALTH - LANGUAGE
No abnormalities noted

## 2019-09-05 NOTE — PROGRESS NOTE BEHAVIORAL HEALTH - THOUGHT PROCESS
Linear
Illogical/Impaired reasoning
Perseverative/Illogical/Tangential
Linear
Perseverative/Illogical/Tangential
Illogical/Tangential/Perseverative
Linear
Perseverative/Illogical/Tangential
Illogical/Impaired reasoning
Linear

## 2019-09-05 NOTE — PROGRESS NOTE BEHAVIORAL HEALTH - FUND OF KNOWLEDGE
Normal
Impaired
Impaired
Normal
Impaired
Normal
Impaired
Normal

## 2019-09-05 NOTE — PROGRESS NOTE BEHAVIORAL HEALTH - NSBHFUPTYPE_PSY_A_CORE
Inpatient

## 2019-09-05 NOTE — PROGRESS NOTE BEHAVIORAL HEALTH - NSBHFUPMEDSE_PSY_A_CORE
None known

## 2019-09-05 NOTE — PROGRESS NOTE BEHAVIORAL HEALTH - PRIMARY DX
Chronic schizoaffective disorder with acute exacerbation
Schizoaffective schizophrenia
Chronic schizoaffective disorder with acute exacerbation

## 2019-09-05 NOTE — PROGRESS NOTE BEHAVIORAL HEALTH - ATTENTION / CONCENTRATION
Normal
Impaired
Normal
Impaired
Normal
Impaired
Normal
Impaired
Normal

## 2019-09-05 NOTE — PROGRESS NOTE BEHAVIORAL HEALTH - NSBHADMITIPOBSFT_PSY_A_CORE
Safety
as is clinically indicated
as is indicated
as is indicated
as clinically indicated
Safety
as is indicated
no imminent threat to self/others
as indicated
as is indicated
Safety
Safety
as clinically indicated
Safety

## 2019-09-05 NOTE — PROGRESS NOTE BEHAVIORAL HEALTH - AFFECT RANGE
Constricted

## 2019-09-06 VITALS
DIASTOLIC BLOOD PRESSURE: 18 MMHG | HEART RATE: 105 BPM | TEMPERATURE: 98 F | SYSTOLIC BLOOD PRESSURE: 112 MMHG | RESPIRATION RATE: 16 BRPM

## 2019-09-06 PROCEDURE — 99238 HOSP IP/OBS DSCHRG MGMT 30/<: CPT

## 2019-09-06 RX ORDER — HALOPERIDOL DECANOATE 100 MG/ML
1 INJECTION INTRAMUSCULAR
Qty: 30 | Refills: 0
Start: 2019-09-06

## 2019-09-06 RX ORDER — LOSARTAN POTASSIUM 100 MG/1
1 TABLET, FILM COATED ORAL
Qty: 30 | Refills: 0
Start: 2019-09-06

## 2019-09-06 RX ORDER — ASPIRIN/CALCIUM CARB/MAGNESIUM 324 MG
1 TABLET ORAL
Qty: 30 | Refills: 0
Start: 2019-09-06

## 2019-09-06 RX ORDER — BENZTROPINE MESYLATE 1 MG
0.3 TABLET ORAL
Qty: 0 | Refills: 0 | DISCHARGE

## 2019-09-06 RX ORDER — AMLODIPINE BESYLATE 2.5 MG/1
1 TABLET ORAL
Qty: 30 | Refills: 0
Start: 2019-09-06

## 2019-09-06 RX ORDER — DIVALPROEX SODIUM 500 MG/1
1 TABLET, DELAYED RELEASE ORAL
Qty: 60 | Refills: 0
Start: 2019-09-06

## 2019-09-06 RX ORDER — HALOPERIDOL DECANOATE 100 MG/ML
1 INJECTION INTRAMUSCULAR
Qty: 0 | Refills: 0 | DISCHARGE

## 2019-09-06 RX ORDER — DOCUSATE SODIUM 100 MG
1 CAPSULE ORAL
Qty: 90 | Refills: 0
Start: 2019-09-06

## 2019-09-06 RX ORDER — QUETIAPINE FUMARATE 200 MG/1
1 TABLET, FILM COATED ORAL
Qty: 30 | Refills: 0
Start: 2019-09-06

## 2019-09-06 RX ORDER — NICOTINE POLACRILEX 2 MG
1 GUM BUCCAL
Qty: 0 | Refills: 0 | DISCHARGE

## 2019-09-06 RX ADMIN — Medication 81 MILLIGRAM(S): at 08:16

## 2019-09-06 RX ADMIN — Medication 1 PATCH: at 08:17

## 2019-09-06 RX ADMIN — Medication 1 PATCH: at 08:16

## 2019-09-06 RX ADMIN — Medication 15 MILLIGRAM(S): at 08:16

## 2019-09-06 RX ADMIN — LOSARTAN POTASSIUM 100 MILLIGRAM(S): 100 TABLET, FILM COATED ORAL at 08:15

## 2019-09-06 RX ADMIN — DIVALPROEX SODIUM 500 MILLIGRAM(S): 500 TABLET, DELAYED RELEASE ORAL at 08:16

## 2019-09-06 RX ADMIN — Medication 100 MILLIGRAM(S): at 13:18

## 2019-09-06 RX ADMIN — Medication 100 MILLIGRAM(S): at 08:16

## 2019-09-06 NOTE — DISCHARGE NOTE BEHAVIORAL HEALTH - NSBHDCSWCOMMENTSFT_PSY_A_CORE
Discharge information faxed to the next level of care-[facility] Ktqtl-[phone] on 9/6/19 at 2:30pm

## 2019-09-06 NOTE — PROGRESS NOTE ADULT - SUBJECTIVE AND OBJECTIVE BOX
pt stable alert in NAD  no new complaints    SCHIZOEFFECTIVE SCHIZOPHRENIA SUICIDAL IDEATION  ^SUICIDAL  Handoff  MEWS Score  Schizophrenia  Hyperlipemia  Depression  HTN (hypertension)  No pertinent past medical history  Schizoaffective schizophrenia  Heart disease  Constipation, unspecified constipation type  Suicidal ideation  Schizoaffective schizophrenia  Chronic schizoaffective disorder with acute exacerbation  Essential hypertension  Acute exacerbation of subchronic schizoaffective schizophrenia  No significant past surgical history  SUICIDAL  3  Suicidal ideation    HEALTH ISSUES - PROBLEM Dx:  Heart disease  Constipation, unspecified constipation type  Suicidal ideation: Suicidal ideation  Schizoaffective schizophrenia: Schizoaffective schizophrenia  Chronic schizoaffective disorder with acute exacerbation  Essential hypertension: Essential hypertension  Acute exacerbation of subchronic schizoaffective schizophrenia: Acute exacerbation of subchronic schizoaffective schizophrenia        PAST MEDICAL & SURGICAL HISTORY:  Schizophrenia  Hyperlipemia  Depression  HTN (hypertension)  No significant past surgical history    tetanus toxoid (Swelling)      FAMILY HISTORY:      amLODIPine   Tablet 10 milliGRAM(s) Oral at bedtime  aspirin enteric coated 81 milliGRAM(s) Oral daily  busPIRone 15 milliGRAM(s) Oral two times a day  diVALproex  milliGRAM(s) Oral two times a day  docusate sodium 100 milliGRAM(s) Oral three times a day  haloperidol     Tablet 5 milliGRAM(s) Oral at bedtime  hydrOXYzine hydrochloride 50 milliGRAM(s) Oral at bedtime PRN  losartan 100 milliGRAM(s) Oral daily  nicotine - 21 mG/24Hr(s) Patch 1 patch Transdermal daily  QUEtiapine 50 milliGRAM(s) Oral three times a day PRN  QUEtiapine 400 milliGRAM(s) Oral at bedtime  senna 1 Tablet(s) Oral every 12 hours PRN      T(C): 36.2 (09-05-19 @ 18:41), Max: 36.4 (09-05-19 @ 09:34)  HR: 75 (09-05-19 @ 18:41) (66 - 75)  BP: 132/69 (09-05-19 @ 18:41) (126/88 - 132/69)  RR: 16 (09-05-19 @ 18:41) (16 - 16)  SpO2: --    PE;  general:  stasble no acute cahgnes ain nnad    Lungs:    Heart:    EXT:    Neuro:  no deficits                          CAPILLARY BLOOD GLUCOSE
pt stable alert in NAD  no new complaints    SCHIZOEFFECTIVE SCHIZOPHRENIA SUICIDAL IDEATION  ^SUICIDAL  MEWS Score  Schizophrenia  Hyperlipemia  Depression  HTN (hypertension)  No pertinent past medical history  Schizoaffective schizophrenia  Suicidal ideation  Schizoaffective schizophrenia  Chronic schizoaffective disorder with acute exacerbation  Essential hypertension  Acute exacerbation of subchronic schizoaffective schizophrenia  No significant past surgical history  SUICIDAL  3  Suicidal ideation      tetanus toxoid (Swelling)          amLODIPine   Tablet 10 milliGRAM(s) Oral at bedtime  aspirin enteric coated 81 milliGRAM(s) Oral daily  busPIRone 15 milliGRAM(s) Oral two times a day  diVALproex  milliGRAM(s) Oral two times a day  docusate sodium 100 milliGRAM(s) Oral three times a day  haloperidol     Tablet 5 milliGRAM(s) Oral at bedtime  hydrOXYzine hydrochloride 50 milliGRAM(s) Oral at bedtime PRN  losartan 100 milliGRAM(s) Oral daily  nicotine - 21 mG/24Hr(s) Patch 1 patch Transdermal daily  QUEtiapine 50 milliGRAM(s) Oral three times a day PRN  QUEtiapine 400 milliGRAM(s) Oral at bedtime  senna 1 Tablet(s) Oral every 12 hours PRN      T(C): 36.4 (09-04-19 @ 06:20), Max: 36.7 (09-03-19 @ 17:33)  HR: 62 (09-04-19 @ 06:20) (62 - 92)  BP: 115/80 (09-04-19 @ 06:20) (115/80 - 168/105)  RR: 20 (09-04-19 @ 06:20) (18 - 20)  SpO2: --    PE;  general:  no cahnges noted  in nad    Lungs:    Heart:    EXT:    Neuro:  alert no deficits                          CAPILLARY BLOOD GLUCOSE
pt stable alert in NAD  no new complaints    SCHIZOEFFECTIVE SCHIZOPHRENIA SUICIDAL IDEATION  ^SUICIDAL  MEWS Score  Schizophrenia  Hyperlipemia  Depression  HTN (hypertension)  No pertinent past medical history  Schizoaffective schizophrenia  Suicidal ideation  Schizoaffective schizophrenia  Chronic schizoaffective disorder with acute exacerbation  Essential hypertension  Acute exacerbation of subchronic schizoaffective schizophrenia  No significant past surgical history  SUICIDAL  3  Suicidal ideation      tetanus toxoid (Swelling)          amLODIPine   Tablet 5 milliGRAM(s) Oral daily  aspirin enteric coated 81 milliGRAM(s) Oral daily  busPIRone 15 milliGRAM(s) Oral two times a day  diVALproex  milliGRAM(s) Oral daily  diVALproex  milliGRAM(s) Oral at bedtime  docusate sodium 100 milliGRAM(s) Oral three times a day  haloperidol     Tablet 5 milliGRAM(s) Oral at bedtime  LORazepam     Tablet 1 milliGRAM(s) Oral three times a day PRN  losartan 100 milliGRAM(s) Oral daily  nicotine  Polacrilex Gum 2 milliGRAM(s) Oral four times a day PRN  nicotine - 21 mG/24Hr(s) Patch 1 patch Transdermal daily PRN  QUEtiapine 50 milliGRAM(s) Oral three times a day PRN  QUEtiapine 100 milliGRAM(s) Oral daily  QUEtiapine 300 milliGRAM(s) Oral at bedtime  senna 1 Tablet(s) Oral every 12 hours PRN      T(C): 36.2 (08-14-19 @ 06:07), Max: 36.2 (08-13-19 @ 16:00)  HR: 68 (08-14-19 @ 06:07) (68 - 86)  BP: 125/77 (08-14-19 @ 06:07) (122/85 - 178/98)  RR: 18 (08-14-19 @ 06:07) (16 - 18)  SpO2: --    PE;  general:  stable in bed no acute changes innad    Lungs:    Heart:    EXT:    Neuro:  alert no deficits                          CAPILLARY BLOOD GLUCOSE
pt stable alert in NAD  no new complaints    SCHIZOEFFECTIVE SCHIZOPHRENIA SUICIDAL IDEATION  ^SUICIDAL  MEWS Score  Schizophrenia  Hyperlipemia  Depression  HTN (hypertension)  No pertinent past medical history  Schizoaffective schizophrenia  Suicidal ideation  Schizoaffective schizophrenia  Chronic schizoaffective disorder with acute exacerbation  Essential hypertension  Acute exacerbation of subchronic schizoaffective schizophrenia  No significant past surgical history  SUICIDAL  3  Suicidal ideation    HEALTH ISSUES - PROBLEM Dx:  Suicidal ideation: Suicidal ideation  Schizoaffective schizophrenia: Schizoaffective schizophrenia  Chronic schizoaffective disorder with acute exacerbation  Essential hypertension: Essential hypertension  Acute exacerbation of subchronic schizoaffective schizophrenia: Acute exacerbation of subchronic schizoaffective schizophrenia        PAST MEDICAL & SURGICAL HISTORY:  Schizophrenia  Hyperlipemia  Depression  HTN (hypertension)  No significant past surgical history    tetanus toxoid (Swelling)      FAMILY HISTORY:      amLODIPine   Tablet 10 milliGRAM(s) Oral at bedtime  aspirin enteric coated 81 milliGRAM(s) Oral daily  busPIRone 15 milliGRAM(s) Oral two times a day  diVALproex  milliGRAM(s) Oral two times a day  docusate sodium 100 milliGRAM(s) Oral three times a day  haloperidol     Tablet 5 milliGRAM(s) Oral at bedtime  hydrOXYzine hydrochloride 100 milliGRAM(s) Oral three times a day PRN  losartan 100 milliGRAM(s) Oral daily  nicotine - 21 mG/24Hr(s) Patch 1 patch Transdermal daily  QUEtiapine 50 milliGRAM(s) Oral three times a day PRN  QUEtiapine 400 milliGRAM(s) Oral at bedtime  senna 1 Tablet(s) Oral every 12 hours PRN      T(C): 36.2 (08-28-19 @ 06:24), Max: 36.3 (08-27-19 @ 18:04)  HR: 67 (08-28-19 @ 06:24) (67 - 80)  BP: 109/72 (08-28-19 @ 06:24) (109/72 - 140/89)  RR: 18 (08-28-19 @ 06:24) (18 - 18)  SpO2: --    PE;  general:  no acute cahnges innad    Lungs:    Heart:    EXT:    Neuro:  alert nod efiits                          CAPILLARY BLOOD GLUCOSE
pt stable alert in NAD  no new complaints    SCHIZOEFFECTIVE SCHIZOPHRENIA SUICIDAL IDEATION  ^SUICIDAL  MEWS Score  Schizophrenia  Hyperlipemia  Depression  HTN (hypertension)  No pertinent past medical history  Schizoaffective schizophrenia  Suicidal ideation  Schizoaffective schizophrenia  Chronic schizoaffective disorder with acute exacerbation  Essential hypertension  Acute exacerbation of subchronic schizoaffective schizophrenia  No significant past surgical history  SUICIDAL  3  Suicidal ideation    HEALTH ISSUES - PROBLEM Dx:  Suicidal ideation: Suicidal ideation  Schizoaffective schizophrenia: Schizoaffective schizophrenia  Chronic schizoaffective disorder with acute exacerbation  Essential hypertension: Essential hypertension  Acute exacerbation of subchronic schizoaffective schizophrenia: Acute exacerbation of subchronic schizoaffective schizophrenia        PAST MEDICAL & SURGICAL HISTORY:  Schizophrenia  Hyperlipemia  Depression  HTN (hypertension)  No significant past surgical history    tetanus toxoid (Swelling)      FAMILY HISTORY:      amLODIPine   Tablet 5 milliGRAM(s) Oral daily  aspirin enteric coated 81 milliGRAM(s) Oral daily  busPIRone 15 milliGRAM(s) Oral two times a day  diVALproex  milliGRAM(s) Oral daily  diVALproex  milliGRAM(s) Oral at bedtime  docusate sodium 100 milliGRAM(s) Oral three times a day  LORazepam     Tablet 1 milliGRAM(s) Oral three times a day PRN  losartan 100 milliGRAM(s) Oral daily  nicotine  Polacrilex Gum 2 milliGRAM(s) Oral four times a day PRN  nicotine - 21 mG/24Hr(s) Patch 1 patch Transdermal daily PRN  QUEtiapine 50 milliGRAM(s) Oral three times a day PRN  QUEtiapine 100 milliGRAM(s) Oral daily  QUEtiapine 300 milliGRAM(s) Oral at bedtime  senna 1 Tablet(s) Oral every 12 hours PRN      T(C): 37.3 (08-12-19 @ 09:54), Max: 37.3 (08-12-19 @ 09:54)  HR: 62 (08-12-19 @ 09:54) (62 - 97)  BP: 120/80 (08-12-19 @ 09:54) (120/80 - 163/116)  RR: 16 (08-12-19 @ 09:54) (16 - 18)  SpO2: --    PE;  general: no cahnges noted in nad    Lungs:    Heart:    EXT:    Neuro:  alert no deficits                          CAPILLARY BLOOD GLUCOSE
pt stable alert in NAD  no new complaints    SCHIZOEFFECTIVE SCHIZOPHRENIA SUICIDAL IDEATION  ^SUICIDAL  MEWS Score  Schizophrenia  Hyperlipemia  Depression  HTN (hypertension)  No pertinent past medical history  Schizoaffective schizophrenia  Suicidal ideation  Schizoaffective schizophrenia  Chronic schizoaffective disorder with acute exacerbation  Essential hypertension  Acute exacerbation of subchronic schizoaffective schizophrenia  No significant past surgical history  SUICIDAL  3  Suicidal ideation    HEALTH ISSUES - PROBLEM Dx:  Suicidal ideation: Suicidal ideation  Schizoaffective schizophrenia: Schizoaffective schizophrenia  Chronic schizoaffective disorder with acute exacerbation  Essential hypertension: Essential hypertension  Acute exacerbation of subchronic schizoaffective schizophrenia: Acute exacerbation of subchronic schizoaffective schizophrenia        PAST MEDICAL & SURGICAL HISTORY:  Schizophrenia  Hyperlipemia  Depression  HTN (hypertension)  No significant past surgical history    tetanus toxoid (Swelling)      FAMILY HISTORY:      amLODIPine   Tablet 5 milliGRAM(s) Oral daily  aspirin enteric coated 81 milliGRAM(s) Oral daily  busPIRone 15 milliGRAM(s) Oral two times a day  diVALproex  milliGRAM(s) Oral daily  diVALproex  milliGRAM(s) Oral at bedtime  docusate sodium 100 milliGRAM(s) Oral three times a day  haloperidol     Tablet 5 milliGRAM(s) Oral at bedtime  losartan 100 milliGRAM(s) Oral daily  nicotine - 21 mG/24Hr(s) Patch 1 patch Transdermal daily  QUEtiapine 50 milliGRAM(s) Oral three times a day PRN  QUEtiapine 100 milliGRAM(s) Oral daily  QUEtiapine 300 milliGRAM(s) Oral at bedtime  senna 1 Tablet(s) Oral every 12 hours PRN      T(C): 36.7 (08-19-19 @ 08:30), Max: 36.7 (08-19-19 @ 08:30)  HR: 77 (08-19-19 @ 08:30) (65 - 79)  BP: 128/83 (08-19-19 @ 08:30) (128/83 - 149/93)  RR: 18 (08-19-19 @ 08:30) (16 - 18)  SpO2: --    PE;  general: no acute changes in nad    Lungs:    Heart:    EXT:    Neuro:   aelrt nod eficits                          CAPILLARY BLOOD GLUCOSE
pt stable alert in NAD  no new complaints    SCHIZOEFFECTIVE SCHIZOPHRENIA SUICIDAL IDEATION  ^SUICIDAL  MEWS Score  Schizophrenia  Hyperlipemia  Depression  HTN (hypertension)  No pertinent past medical history  Schizoaffective schizophrenia  Suicidal ideation  Schizoaffective schizophrenia  Chronic schizoaffective disorder with acute exacerbation  Essential hypertension  Acute exacerbation of subchronic schizoaffective schizophrenia  No significant past surgical history  SUICIDAL  3  Suicidal ideation    HEALTH ISSUES - PROBLEM Dx:  Suicidal ideation: Suicidal ideation  Schizoaffective schizophrenia: Schizoaffective schizophrenia  Chronic schizoaffective disorder with acute exacerbation  Essential hypertension: Essential hypertension  Acute exacerbation of subchronic schizoaffective schizophrenia: Acute exacerbation of subchronic schizoaffective schizophrenia        PAST MEDICAL & SURGICAL HISTORY:  Schizophrenia  Hyperlipemia  Depression  HTN (hypertension)  No significant past surgical history    tetanus toxoid (Swelling)      FAMILY HISTORY:      amLODIPine   Tablet 5 milliGRAM(s) Oral daily  aspirin enteric coated 81 milliGRAM(s) Oral daily  busPIRone 15 milliGRAM(s) Oral two times a day  diVALproex  milliGRAM(s) Oral two times a day  docusate sodium 100 milliGRAM(s) Oral three times a day  haloperidol     Tablet 5 milliGRAM(s) Oral at bedtime  losartan 100 milliGRAM(s) Oral daily  nicotine - 21 mG/24Hr(s) Patch 1 patch Transdermal daily  QUEtiapine 50 milliGRAM(s) Oral three times a day PRN  QUEtiapine 100 milliGRAM(s) Oral daily  QUEtiapine 300 milliGRAM(s) Oral at bedtime  senna 1 Tablet(s) Oral every 12 hours PRN      T(C): 36.2 (08-21-19 @ 06:13), Max: 36.3 (08-20-19 @ 11:15)  HR: 55 (08-21-19 @ 06:13) (55 - 82)  BP: 165/97 (08-21-19 @ 06:13) (140/69 - 165/97)  RR: 20 (08-21-19 @ 06:13) (16 - 20)  SpO2: --    PE;  general:  stable in bed in nad    Lungs:    Heart:    EXT:    Neuro:  alert no  deficits                          CAPILLARY BLOOD GLUCOSE
pt stable alert in NAD  no new complaints    SCHIZOEFFECTIVE SCHIZOPHRENIA SUICIDAL IDEATION  ^SUICIDAL  MEWS Score  Schizophrenia  Hyperlipemia  Depression  HTN (hypertension)  No pertinent past medical history  Schizoaffective schizophrenia  Suicidal ideation  Schizoaffective schizophrenia  Chronic schizoaffective disorder with acute exacerbation  Essential hypertension  Acute exacerbation of subchronic schizoaffective schizophrenia  No significant past surgical history  SUICIDAL  3  Suicidal ideation    HEALTH ISSUES - PROBLEM Dx:  Suicidal ideation: Suicidal ideation  Schizoaffective schizophrenia: Schizoaffective schizophrenia  Chronic schizoaffective disorder with acute exacerbation  Essential hypertension: Essential hypertension  Acute exacerbation of subchronic schizoaffective schizophrenia: Acute exacerbation of subchronic schizoaffective schizophrenia        PAST MEDICAL & SURGICAL HISTORY:  Schizophrenia  Hyperlipemia  Depression  HTN (hypertension)  No significant past surgical history    tetanus toxoid (Swelling)      FAMILY HISTORY:      amLODIPine   Tablet 10 milliGRAM(s) Oral at bedtime  aspirin enteric coated 81 milliGRAM(s) Oral daily  busPIRone 15 milliGRAM(s) Oral two times a day  diVALproex  milliGRAM(s) Oral two times a day  docusate sodium 100 milliGRAM(s) Oral three times a day  haloperidol     Tablet 5 milliGRAM(s) Oral at bedtime  hydrOXYzine hydrochloride 100 milliGRAM(s) Oral three times a day PRN  losartan 100 milliGRAM(s) Oral daily  nicotine - 21 mG/24Hr(s) Patch 1 patch Transdermal daily  QUEtiapine 50 milliGRAM(s) Oral three times a day PRN  QUEtiapine 400 milliGRAM(s) Oral at bedtime  senna 1 Tablet(s) Oral every 12 hours PRN      T(C): 36.9 (08-25-19 @ 16:01), Max: 36.9 (08-25-19 @ 16:01)  HR: 79 (08-25-19 @ 16:01) (79 - 88)  BP: 146/80 (08-25-19 @ 16:01) (117/77 - 146/80)  RR: 18 (08-25-19 @ 16:01) (18 - 18)  SpO2: --    PE;  general:  no acute changes in nad    Lungs:    Heart:    EXT:    Neuro:   aelrt no deficits                          CAPILLARY BLOOD GLUCOSE

## 2019-09-06 NOTE — CHART NOTE - NSCHARTNOTEFT_GEN_A_CORE
Pt. is scheduled for discharge on this date.  He reports feeling better since his admission.  Pt. denies any suicidal or homicidal ideation or any A/V hallucinations.  Pt. will return to his residence at [facility] where he also receives mental health services.  Required documentation has been faxed to [facility] and the original documents were given to medical staff to be sent back to the facility with the pt.  Pt. presents as stable for discharge on this date.

## 2019-09-06 NOTE — DISCHARGE NOTE BEHAVIORAL HEALTH - HPI (INCLUDE ILLNESS QUALITY, SEVERITY, DURATION, TIMING, CONTEXT, MODIFYING FACTORS, ASSOCIATED SIGNS AND SYMPTOMS)
59 y/o male known from prior admits, with dx schizoaffective disorder, who was brought in from residence with vague c/o of wanting to cut his wrist or stab himself.   On admission pt was not psychotic and not expressing any such thoughts. His mood improved somewhat, and he voiced how he preferred to be in hospital over his residence,     Medications were continued, and pt expressed insight about returning to residence, and not needing hospitalization. He was without psychosis and able to contract for safety

## 2019-09-06 NOTE — PROGRESS NOTE ADULT - PROBLEM SELECTOR PROBLEM 1
Acute exacerbation of subchronic schizoaffective schizophrenia
Chronic schizoaffective disorder with acute exacerbation
Essential hypertension
Schizoaffective schizophrenia

## 2019-09-06 NOTE — PROGRESS NOTE ADULT - ASSESSMENT
medically stable with no acute issues
medically stable with no acute issues  bp labile
medically stable with no acute issues  bp ntoed occssionl; high
medically stable with no acute issues

## 2019-09-06 NOTE — PROGRESS NOTE ADULT - REASON FOR ADMISSION
58 YEARS OLD MALE COME TO ER FOR PSYCHIATRIC EVALUATION .

## 2019-09-06 NOTE — DISCHARGE NOTE BEHAVIORAL HEALTH - MEDICATION SUMMARY - MEDICATIONS TO TAKE
I will START or STAY ON the medications listed below when I get home from the hospital:    Aspirin Enteric Coated 81 mg oral delayed release tablet  -- 1 tab(s) by mouth once a day until md stops  -- Indication: For Heart disease    losartan 100 mg oral tablet  -- 1 tab(s) by mouth once a day until directed by md to stop  -- Indication: For Essential hypertension    divalproex sodium 500 mg oral delayed release tablet  -- 1 tab(s) by mouth 2 times a day until md stops  -- Indication: For Chronic schizoaffective disorder with acute exacerbation    haloperidol 5 mg oral tablet  -- 1 tab(s) by mouth once a day (at bedtime) until md stops  -- Indication: For Chronic schizoaffective disorder with acute exacerbation    QUEtiapine 400 mg oral tablet  -- 1 tab(s) by mouth once a day (at bedtime) until md stops  -- Indication: For Chronic schizoaffective disorder with acute exacerbation    busPIRone 15 mg oral tablet  -- 1 tab(s) by mouth 2 times a day until md stops  -- Indication: For Chronic schizoaffective disorder with acute exacerbation    amLODIPine 10 mg oral tablet  -- 1 tab(s) by mouth once a day (at bedtime) until md stops  -- Indication: For Essential hypertension    docusate sodium 100 mg oral capsule  -- 1 cap(s) by mouth 3 times a day until md stops  -- Indication: For Constipation, unspecified constipation type

## 2019-09-06 NOTE — PROGRESS NOTE ADULT - PROBLEM SELECTOR PLAN 1
continue present treatment as per psych plan as reviewed  Medically stable with no new changes in treatment  will continue to monitor medical status while being treated on psych
continue present treatment as per psych plan as reviewed  Medically stable with no new changes in treatment  will continue to monitor medical status while being treated on psych  medically stable for d/c as per murphy on meds as reviweed
cotn meds as ofelia ascencio will continue to monitor

## 2019-09-06 NOTE — DISCHARGE NOTE BEHAVIORAL HEALTH - MEDICATION SUMMARY - MEDICATIONS TO STOP TAKING
I will STOP taking the medications listed below when I get home from the hospital:    Aspir 81 oral delayed release tablet  -- 1 tab(s) by mouth once a day until told by MD to stop    losartan 100 mg oral tablet  -- 1 tab(s) by mouth once a day until told by MD to stop    divalproex sodium 500 mg oral delayed release tablet  -- 1 tab(s) by mouth once a day (at bedtime) until told by MD to stop    divalproex sodium 500 mg oral delayed release tablet  -- 1 tab(s) by mouth once a day in morning until told by MD to stop    benztropine 0.5 mg oral tablet  -- 1 tab(s) by mouth 2 times a day until told by MD to stop    Colace 100 mg oral capsule  -- 1 cap(s) by mouth 3 times a day until told by MD to stop    Norvasc 5 mg oral tablet  -- 1 tab(s) by mouth once a day until told by MD to stop    SEROquel 100 mg oral tablet  -- 1 tab(s) by mouth once a day until told by MD to stop    SEROquel 300 mg oral tablet  -- 1 tab(s) by mouth once (at bedtime) until told by md to stop    busPIRone 15 mg oral tablet  -- 1 tab(s) by mouth 2 times a day until told by md to stop

## 2019-09-09 DIAGNOSIS — I51.9 HEART DISEASE, UNSPECIFIED: ICD-10-CM

## 2019-09-09 DIAGNOSIS — Z79.82 LONG TERM (CURRENT) USE OF ASPIRIN: ICD-10-CM

## 2019-09-09 DIAGNOSIS — E78.5 HYPERLIPIDEMIA, UNSPECIFIED: ICD-10-CM

## 2019-09-09 DIAGNOSIS — N62 HYPERTROPHY OF BREAST: ICD-10-CM

## 2019-09-09 DIAGNOSIS — F25.9 SCHIZOAFFECTIVE DISORDER, UNSPECIFIED: ICD-10-CM

## 2019-09-09 DIAGNOSIS — R45.851 SUICIDAL IDEATIONS: ICD-10-CM

## 2019-09-09 DIAGNOSIS — K59.00 CONSTIPATION, UNSPECIFIED: ICD-10-CM

## 2019-09-09 DIAGNOSIS — I10 ESSENTIAL (PRIMARY) HYPERTENSION: ICD-10-CM

## 2019-09-11 ENCOUNTER — OUTPATIENT (OUTPATIENT)
Dept: OUTPATIENT SERVICES | Facility: HOSPITAL | Age: 59
LOS: 1 days | Discharge: HOME | End: 2019-09-11

## 2019-09-11 DIAGNOSIS — K02.63 DENTAL CARIES ON SMOOTH SURFACE PENETRATING INTO PULP: ICD-10-CM

## 2019-12-23 ENCOUNTER — APPOINTMENT (OUTPATIENT)
Dept: OTOLARYNGOLOGY | Facility: CLINIC | Age: 59
End: 2019-12-23
Payer: MEDICARE

## 2019-12-23 PROCEDURE — 69210 REMOVE IMPACTED EAR WAX UNI: CPT

## 2019-12-23 PROCEDURE — 31575 DIAGNOSTIC LARYNGOSCOPY: CPT

## 2019-12-23 PROCEDURE — 99203 OFFICE O/P NEW LOW 30 MIN: CPT | Mod: 25

## 2019-12-23 RX ORDER — ASPIRIN 81 MG
6.5 TABLET, DELAYED RELEASE (ENTERIC COATED) ORAL
Qty: 2 | Refills: 0 | Status: ACTIVE | COMMUNITY
Start: 2019-12-23 | End: 1900-01-01

## 2019-12-23 NOTE — PHYSICAL EXAM
[de-identified] : bilateral impacted wax cleaned partially with curette [Midline] : trachea located in midline position [de-identified] : soft palate papillomatous lesion. [Normal] : no rashes

## 2019-12-23 NOTE — HISTORY OF PRESENT ILLNESS
[de-identified] : 59 Year old male here today with his aid c/o a globus sensation in his throat. \par In january 2014, patient started having a sensation of mucus stuck in his throat. no change over time.  has not tried meds. \par No voice change. No dysphagia.\par

## 2020-01-14 NOTE — PROGRESS NOTE BEHAVIORAL HEALTH - SECONDARY DX2
Suicidal ideation
- - -
Constipation, unspecified constipation type
Suicidal ideation
Suicidal ideation

## 2020-01-17 ENCOUNTER — APPOINTMENT (OUTPATIENT)
Dept: OTOLARYNGOLOGY | Facility: CLINIC | Age: 60
End: 2020-01-17
Payer: MEDICARE

## 2020-01-17 PROCEDURE — 99214 OFFICE O/P EST MOD 30 MIN: CPT | Mod: 25

## 2020-01-17 PROCEDURE — 31575 DIAGNOSTIC LARYNGOSCOPY: CPT

## 2020-01-17 PROCEDURE — 69210 REMOVE IMPACTED EAR WAX UNI: CPT

## 2020-01-17 RX ORDER — CHLORHEXIDINE GLUCONATE, 0.12% ORAL RINSE 1.2 MG/ML
0.12 SOLUTION DENTAL
Qty: 1 | Refills: 0 | Status: ACTIVE | COMMUNITY
Start: 2019-12-23 | End: 1900-01-01

## 2020-01-17 NOTE — PHYSICAL EXAM
[de-identified] : bilateral impacted wax cleaned completely with curette [de-identified] : soft palate papillomatous lesion. [Midline] : trachea located in midline position [Normal] : orientation to person, place, and time: normal

## 2020-01-17 NOTE — ASSESSMENT
[FreeTextEntry1] : Laryngeal asymmetry on exam. \par \par soft palate papillomatous lesion to be biopsies.

## 2020-01-17 NOTE — HISTORY OF PRESENT ILLNESS
[FreeTextEntry1] : Patient following up on lesion of oral mucosa. \par He was given chlorhexidin 3 weeks ago but was never delivered to him.\par He was also given deberox for impacted wax and still havent received it.\par \par Dr Glez put him on stomach medication, his symptoms are still there, complaining of phlegm in his throat permanently. No change in voice. no swallowing issues.

## 2020-01-23 NOTE — ED ADULT TRIAGE NOTE - WEIGHT METHOD
From: Stephane Morales  To: Antonino Vazquez MD  Sent: 1/23/2020 12:55 PM CST  Subject: Lab Test or Test Related Question    Dr. VAZQUEZ, I had my blood drawn at [facility] on Wed. 1/22 to recheck the levels   being controlled by the Tacrolimus. Results should be   available today or tomorrow on [facility].  The order came from Dr. Cabral.   stated

## 2020-09-15 ENCOUNTER — EMERGENCY (EMERGENCY)
Facility: HOSPITAL | Age: 60
LOS: 0 days | Discharge: HOME | End: 2020-09-15
Attending: EMERGENCY MEDICINE | Admitting: EMERGENCY MEDICINE
Payer: MEDICARE

## 2020-09-15 VITALS
HEIGHT: 70 IN | OXYGEN SATURATION: 95 % | TEMPERATURE: 99 F | SYSTOLIC BLOOD PRESSURE: 175 MMHG | RESPIRATION RATE: 20 BRPM | DIASTOLIC BLOOD PRESSURE: 99 MMHG | HEART RATE: 84 BPM

## 2020-09-15 DIAGNOSIS — Z88.8 ALLERGY STATUS TO OTHER DRUGS, MEDICAMENTS AND BIOLOGICAL SUBSTANCES: ICD-10-CM

## 2020-09-15 DIAGNOSIS — Z79.899 OTHER LONG TERM (CURRENT) DRUG THERAPY: ICD-10-CM

## 2020-09-15 DIAGNOSIS — K08.89 OTHER SPECIFIED DISORDERS OF TEETH AND SUPPORTING STRUCTURES: ICD-10-CM

## 2020-09-15 DIAGNOSIS — F32.9 MAJOR DEPRESSIVE DISORDER, SINGLE EPISODE, UNSPECIFIED: ICD-10-CM

## 2020-09-15 DIAGNOSIS — I10 ESSENTIAL (PRIMARY) HYPERTENSION: ICD-10-CM

## 2020-09-15 DIAGNOSIS — F17.210 NICOTINE DEPENDENCE, CIGARETTES, UNCOMPLICATED: ICD-10-CM

## 2020-09-15 DIAGNOSIS — E78.5 HYPERLIPIDEMIA, UNSPECIFIED: ICD-10-CM

## 2020-09-15 DIAGNOSIS — Z79.01 LONG TERM (CURRENT) USE OF ANTICOAGULANTS: ICD-10-CM

## 2020-09-15 PROCEDURE — 99282 EMERGENCY DEPT VISIT SF MDM: CPT | Mod: GC

## 2020-09-15 NOTE — ED PROVIDER NOTE - CARE PROVIDERS DIRECT ADDRESSES
[email or web address]

## 2020-09-15 NOTE — ED PROVIDER NOTE - PHYSICAL EXAMINATION
CONSTITUTIONAL: NAD  HEAD: Normocephalic; atraumatic.  EYES: PERRLA  ENT: Various teeth missing with poor dentition.   NECK: Supple; non tender.  CARD: S1, S2 normal; no murmurs, gallops, or rubs. Regular rate and rhythm.   RESP: No wheezes, rales or rhonchi. CONSTITUTIONAL: NAD. Protecting airway  HEAD: Normocephalic; atraumatic.  EYES: PERRLA  ENT: Multiple teeth missing with generally poor dentition.   NECK: Supple; non tender, normal ROM  CARD: RRR  RESP: No wheezes, rales or rhonchi. No stridor

## 2020-09-15 NOTE — ED PROVIDER NOTE - ATTENDING CONTRIBUTION TO CARE
60yo M with PMHx schizophrenia, HTN, HLD, depression, presents for dental complaint. Pt states he had dental implants for missing teeth done previously in [place], lost insurance and can't go back to old dentist. Is requesting eval for implants or false teeth as work was not completed and he states he still missing a lot of teeth. Denies dental pain. Denies acute symptoms. Exam as above.

## 2020-09-15 NOTE — CONSULT NOTE ADULT - SUBJECTIVE AND OBJECTIVE BOX
Patient is a 59y old  Male who presents with a chief complaint of missing teeth.    HPI: Patient states he would  like to get his teeth checked/fixed. Patient is currently not experiencing pain.       PAST MEDICAL & SURGICAL HISTORY:  Schizophrenia    Hyperlipemia    Depression    HTN (hypertension)    No significant past surgical history      (  - ) heart valve replacement  (  - ) joint replacement      MEDICATIONS  (STANDING):    MEDICATIONS  (PRN):      Allergies    tetanus toxoid (Swelling)    Intolerances        FAMILY HISTORY:      *SOCIAL HISTORY: (   ) Tobacco; (   ) ETOH    *Last Dental Visit:    Vital Signs Last 24 Hrs  T(C): 37.2 (15 Sep 2020 08:08), Max: 37.2 (15 Sep 2020 08:08)  T(F): 98.9 (15 Sep 2020 08:08), Max: 98.9 (15 Sep 2020 08:08)  HR: 84 (15 Sep 2020 08:08) (84 - 84)  BP: 175/99 (15 Sep 2020 08:08) (175/99 - 175/99)  BP(mean): --  RR: 20 (15 Sep 2020 08:08) (20 - 20)  SpO2: 95% (15 Sep 2020 08:08) (95% - 95%)    LABS:                  EOE:  TMJ ( -  ) clicks                     (  - ) pops                     (  - ) crepitus             Mandible <<FROM>>             Facial bones and MOM <<grossly intact>>             (  - ) trismus             ( -) lymphadenopathy             (  - ) swelling             (  - ) asymmetry             ( -  ) palpation             (  - ) dyspnea             (  -) dysphagia             (  - ) loss of consciousness    IOE:             (  - ) percussion           (  - ) palpation           (  -) swelling            ( - ) abscess           (  - ) sinus tract    *ASSESSMENT: Patient advised to schedule a hygiene and comprehensive care appointment. Patient understands and agrees.      RECOMMENDATIONS:  1) Schedule a comprehensive care appointment  2) Dental F/U with outpatient dentist for comprehensive dental care.   3) If any difficulty swallowing/breathing, fever occur, return to ER.     Resident Name, Lindsay Solis DDS pager #[phone] Patient is a 59y old  Male who presents with a chief complaint of missing teeth.    HPI: Patient states he would  like to get his teeth checked/fixed. Patient is currently not experiencing pain.       PAST MEDICAL & SURGICAL HISTORY:  Schizophrenia    Hyperlipemia    Depression    HTN (hypertension)    No significant past surgical history      (  - ) heart valve replacement  (  - ) joint replacement      MEDICATIONS  (STANDING):    MEDICATIONS  (PRN):      Allergies    tetanus toxoid (Swelling)    Intolerances        FAMILY HISTORY:      *SOCIAL HISTORY: ( +  ) Tobacco; (   ) ETOH    *Last Dental Visit:    Vital Signs Last 24 Hrs  T(C): 37.2 (15 Sep 2020 08:08), Max: 37.2 (15 Sep 2020 08:08)  T(F): 98.9 (15 Sep 2020 08:08), Max: 98.9 (15 Sep 2020 08:08)  HR: 84 (15 Sep 2020 08:08) (84 - 84)  BP: 175/99 (15 Sep 2020 08:08) (175/99 - 175/99)  BP(mean): --  RR: 20 (15 Sep 2020 08:08) (20 - 20)  SpO2: 95% (15 Sep 2020 08:08) (95% - 95%)    LABS:                  EOE:  TMJ ( -  ) clicks                     (  - ) pops                     (  - ) crepitus             Mandible <<FROM>>             Facial bones and MOM <<grossly intact>>             (  - ) trismus             ( -) lymphadenopathy             (  - ) swelling             (  - ) asymmetry             ( -  ) palpation             (  - ) dyspnea             (  -) dysphagia             (  - ) loss of consciousness    IOE:             (  - ) percussion           (  - ) palpation           (  -) swelling            ( - ) abscess           (  - ) sinus tract    *ASSESSMENT: Patient advised to schedule a hygiene/comprehensive care appointment. Patient understands and agrees.      RECOMMENDATIONS:  1) Schedule a comprehensive care appointment  2) Dental F/U with outpatient dentist for comprehensive dental care.   3) If any difficulty swallowing/breathing, fever occur, return to ER.     Resident Name, Lindsay Solis DDS pager #[phone]

## 2020-09-15 NOTE — ED PROVIDER NOTE - NSFOLLOWUPINSTRUCTIONS_ED_ALL_ED_FT
You were seen in the ED for requesting dental implants.    Practice good oral hygiene.    Ask about fluoride supplements if you live in a community without fluorinated water or with water that has a low fluoride content. Use fluoride supplements as directed by your dentist or health care provider.  Allow fluoride varnish applications to teeth if directed by your dentist or health care provider.     Please follow up with your Primary MD in 24-48 hr.  Seek immediate medical care for any new/worsening signs or symptoms including fever, worsening pain, chest pain, SOB, and difficulty swallowing.

## 2020-09-15 NOTE — ED PROVIDER NOTE - CLINICAL SUMMARY MEDICAL DECISION MAKING FREE TEXT BOX
58yo M presents requesting dental eval. No acute pain. No fever. No infection. Will have dental service eval patient.

## 2020-09-15 NOTE — ED PROVIDER NOTE - OBJECTIVE STATEMENT
59 y.o. male w/ PMH of schizophrenia, HLD, depression, HTN presents for dental complaint x years.  States that in [id number], he had various teeth removed and would like dental implants and false teeth placed.  Denies fevers, dental pain, sore throat, and other symptoms. 59 y.o. male w/ PMH of schizophrenia, HLD, depression, HTN presents for dental complaint x years.  States that in [id number], he had various teeth removed and would like dental implants and false teeth placed.  Denies fevers, dental pain, sore throat, and other symptoms. No other complaints at this time.

## 2020-09-15 NOTE — ED PROVIDER NOTE - CARE PROVIDER_API CALL
Wilmer Glez  [address] [place], [place] [id number]  [address] [place] [address]  Phone: [phone]  Fax: [phone]  Established Patient  Follow Up Time: Urgent

## 2020-09-15 NOTE — ED PROVIDER NOTE - NS ED ROS FT
General: No fevers, chills, or malaise.  HEENT: No headache,, ear pain, or sore throat.  Cardiac:  No chest pain, SOB,   Respiratory:  No cough, respiratory distress, or hemoptysis.

## 2021-03-10 ENCOUNTER — APPOINTMENT (OUTPATIENT)
Dept: OTOLARYNGOLOGY | Facility: CLINIC | Age: 61
End: 2021-03-10
Payer: MEDICARE

## 2021-03-10 DIAGNOSIS — K13.79 OTHER LESIONS OF ORAL MUCOSA: ICD-10-CM

## 2021-03-10 DIAGNOSIS — H61.23 IMPACTED CERUMEN, BILATERAL: ICD-10-CM

## 2021-03-10 DIAGNOSIS — R09.89 OTHER SPECIFIED SYMPTOMS AND SIGNS INVOLVING THE CIRCULATORY AND RESPIRATORY SYSTEMS: ICD-10-CM

## 2021-03-10 PROCEDURE — 69210 REMOVE IMPACTED EAR WAX UNI: CPT

## 2021-03-10 PROCEDURE — 99214 OFFICE O/P EST MOD 30 MIN: CPT | Mod: 25

## 2021-03-10 PROCEDURE — 31575 DIAGNOSTIC LARYNGOSCOPY: CPT

## 2021-03-10 NOTE — ASSESSMENT
[FreeTextEntry1] : -recurrent impacted wax cleaned with curette.\par \par -recurrent thrush. prescribed nystatin.

## 2021-03-10 NOTE — PHYSICAL EXAM
[Midline] : trachea located in midline position [Normal] : no rashes [de-identified] : bilateral impacted wax cleaned completely with curette [de-identified] : soft palate papillomatous lesion.

## 2021-03-10 NOTE — REASON FOR VISIT
[Subsequent Evaluation] : a subsequent evaluation for [FreeTextEntry2] : throat discomfort , clogged ear

## 2021-03-10 NOTE — HISTORY OF PRESENT ILLNESS
[de-identified] : 01/17/2020: Patient following up on lesion of oral mucosa. \par He was given chlorhexidin 3 weeks ago but was never delivered to him.\par He was also given deberox for impacted wax and still havent received it.\par \par Dr Glez put him on stomach medication, his symptoms are still there, complaining of phlegm in his throat permanently. No change in voice. no swallowing issues.  [FreeTextEntry1] : 03/10/21 : Patient presents today following up on throat discomfort and clogged ears. Had excessive phlegm in throat.  Denies any pain or hard time swallowing. He is constantly clearing throat. \par Ear do feel clogged , he is able to hear well.  Does have a history of impacted ears . \par

## 2021-04-12 ENCOUNTER — RX RENEWAL (OUTPATIENT)
Age: 61
End: 2021-04-12

## 2021-04-12 RX ORDER — NYSTATIN 100000 [USP'U]/ML
100000 SUSPENSION ORAL TWICE DAILY
Qty: 300 | Refills: 5 | Status: ACTIVE | COMMUNITY
Start: 2021-03-10 | End: 1900-01-01

## 2021-06-12 NOTE — PROGRESS NOTE BEHAVIORAL HEALTH - NSBHATTESTSEENBY_PSY_A_CORE
GI H&P  6/12/2021    Primary Care Physician  Gino Cortes MD    Chief Complaint  FOBT+    History Of Present Illness  Bertram is a 78 year old male presenting for colonoscopy for FOBT+ Not anemic. No indication for EGD.    Assessment and Plan  Proceed with colonoscopy as scheduled      Past Medical History:   Diagnosis Date   • ED (erectile dysfunction)    • Essential (primary) hypertension       Past Surgical History:   Procedure Laterality Date   • Achilles tendon surgery Right         Social History     Tobacco Use   • Smoking status: Never Smoker   • Smokeless tobacco: Never Used   Substance Use Topics   • Alcohol use: No   • Drug use: No     Family History   Problem Relation Age of Onset   • Patient is unaware of any medical problems Sister         ALLERGIES:  Pollen, Dog dander, and Molds & smuts    (Not in a hospital admission)      Review of Systems  As per HPI all other systems negative     Last Recorded Vitals  Blood pressure (!) 146/99, pulse (!) 58, temperature 98.1 °F (36.7 °C), temperature source Oral, resp. rate 20, height 6' 3\" (1.905 m), weight 95.3 kg (210 lb), SpO2 99 %.    Physical Exam     Imaging       Labs   Lab Services on 06/10/2021   Component Date Value   • SARS-CoV-2 by PCR 06/10/2021 Not Detected    • Isolation Guidelines 06/10/2021                      Value:This result contains rich text formatting which cannot be displayed here.   • Procedural Notes 06/10/2021                      Value:This result contains rich text formatting which cannot be displayed here.       Thank you for involving me in the care of this patient.    Electronically signed by Azra Freeman MD  [facility]     attending Psychiatrist without NP/Trainee

## 2021-06-23 NOTE — PROGRESS NOTE BEHAVIORAL HEALTH - NS ED BHA MSE SPEECH ARTICULATION
Normal
Spontaneous
Normal

## 2021-07-08 ENCOUNTER — INPATIENT (INPATIENT)
Facility: HOSPITAL | Age: 61
LOS: 3 days | Discharge: NURSING, REHAB OR LONG-TERM CARE | End: 2021-07-12
Attending: INTERNAL MEDICINE | Admitting: INTERNAL MEDICINE
Payer: MEDICARE

## 2021-07-08 VITALS
TEMPERATURE: 97 F | DIASTOLIC BLOOD PRESSURE: 72 MMHG | RESPIRATION RATE: 18 BRPM | WEIGHT: 250 LBS | HEART RATE: 81 BPM | HEIGHT: 70 IN | SYSTOLIC BLOOD PRESSURE: 119 MMHG | OXYGEN SATURATION: 98 %

## 2021-07-08 DIAGNOSIS — F25.9 SCHIZOAFFECTIVE DISORDER, UNSPECIFIED: ICD-10-CM

## 2021-07-08 LAB
ALBUMIN SERPL ELPH-MCNC: 4.1 G/DL — SIGNIFICANT CHANGE UP (ref 3.5–5.2)
ALBUMIN SERPL ELPH-MCNC: 4.5 G/DL — SIGNIFICANT CHANGE UP (ref 3.5–5.2)
ALP SERPL-CCNC: 74 U/L — SIGNIFICANT CHANGE UP (ref 30–115)
ALP SERPL-CCNC: 83 U/L — SIGNIFICANT CHANGE UP (ref 30–115)
ALT FLD-CCNC: 6 U/L — SIGNIFICANT CHANGE UP (ref 0–41)
ALT FLD-CCNC: 6 U/L — SIGNIFICANT CHANGE UP (ref 0–41)
AMPHET UR-MCNC: NEGATIVE — SIGNIFICANT CHANGE UP
ANION GAP SERPL CALC-SCNC: 10 MMOL/L — SIGNIFICANT CHANGE UP (ref 7–14)
ANION GAP SERPL CALC-SCNC: 11 MMOL/L — SIGNIFICANT CHANGE UP (ref 7–14)
ANION GAP SERPL CALC-SCNC: 11 MMOL/L — SIGNIFICANT CHANGE UP (ref 7–14)
ANION GAP SERPL CALC-SCNC: 7 MMOL/L — SIGNIFICANT CHANGE UP (ref 7–14)
ANION GAP SERPL CALC-SCNC: 7 MMOL/L — SIGNIFICANT CHANGE UP (ref 7–14)
ANION GAP SERPL CALC-SCNC: 8 MMOL/L — SIGNIFICANT CHANGE UP (ref 7–14)
APAP SERPL-MCNC: <5 UG/ML — LOW (ref 10–30)
APPEARANCE UR: CLEAR — SIGNIFICANT CHANGE UP
AST SERPL-CCNC: 12 U/L — SIGNIFICANT CHANGE UP (ref 0–41)
AST SERPL-CCNC: 13 U/L — SIGNIFICANT CHANGE UP (ref 0–41)
BARBITURATES UR SCN-MCNC: NEGATIVE — SIGNIFICANT CHANGE UP
BASOPHILS # BLD AUTO: 0.04 K/UL — SIGNIFICANT CHANGE UP (ref 0–0.2)
BASOPHILS NFR BLD AUTO: 0.5 % — SIGNIFICANT CHANGE UP (ref 0–1)
BENZODIAZ UR-MCNC: NEGATIVE — SIGNIFICANT CHANGE UP
BILIRUB SERPL-MCNC: 0.3 MG/DL — SIGNIFICANT CHANGE UP (ref 0.2–1.2)
BILIRUB SERPL-MCNC: 0.3 MG/DL — SIGNIFICANT CHANGE UP (ref 0.2–1.2)
BILIRUB UR-MCNC: NEGATIVE — SIGNIFICANT CHANGE UP
BUN SERPL-MCNC: 6 MG/DL — LOW (ref 10–20)
BUN SERPL-MCNC: 7 MG/DL — LOW (ref 10–20)
BUN SERPL-MCNC: 8 MG/DL — LOW (ref 10–20)
BUN SERPL-MCNC: 8 MG/DL — LOW (ref 10–20)
CALCIUM SERPL-MCNC: 8.8 MG/DL — SIGNIFICANT CHANGE UP (ref 8.5–10.1)
CALCIUM SERPL-MCNC: 9.1 MG/DL — SIGNIFICANT CHANGE UP (ref 8.5–10.1)
CALCIUM SERPL-MCNC: 9.4 MG/DL — SIGNIFICANT CHANGE UP (ref 8.5–10.1)
CALCIUM SERPL-MCNC: 9.4 MG/DL — SIGNIFICANT CHANGE UP (ref 8.5–10.1)
CALCIUM SERPL-MCNC: 9.6 MG/DL — SIGNIFICANT CHANGE UP (ref 8.5–10.1)
CALCIUM SERPL-MCNC: 9.6 MG/DL — SIGNIFICANT CHANGE UP (ref 8.5–10.1)
CHLORIDE SERPL-SCNC: 77 MMOL/L — LOW (ref 98–110)
CHLORIDE SERPL-SCNC: 84 MMOL/L — LOW (ref 98–110)
CHLORIDE SERPL-SCNC: 84 MMOL/L — LOW (ref 98–110)
CHLORIDE SERPL-SCNC: 85 MMOL/L — LOW (ref 98–110)
CHLORIDE SERPL-SCNC: 86 MMOL/L — LOW (ref 98–110)
CHLORIDE SERPL-SCNC: 89 MMOL/L — LOW (ref 98–110)
CO2 SERPL-SCNC: 26 MMOL/L — SIGNIFICANT CHANGE UP (ref 17–32)
CO2 SERPL-SCNC: 27 MMOL/L — SIGNIFICANT CHANGE UP (ref 17–32)
CO2 SERPL-SCNC: 29 MMOL/L — SIGNIFICANT CHANGE UP (ref 17–32)
CO2 SERPL-SCNC: 30 MMOL/L — SIGNIFICANT CHANGE UP (ref 17–32)
COCAINE METAB.OTHER UR-MCNC: NEGATIVE — SIGNIFICANT CHANGE UP
COLOR SPEC: YELLOW — SIGNIFICANT CHANGE UP
COVID-19 SPIKE DOMAIN AB INTERP: POSITIVE
COVID-19 SPIKE DOMAIN ANTIBODY RESULT: 5.11 U/ML — HIGH
CREAT SERPL-MCNC: 0.6 MG/DL — LOW (ref 0.7–1.5)
CREAT SERPL-MCNC: 0.7 MG/DL — SIGNIFICANT CHANGE UP (ref 0.7–1.5)
DIFF PNL FLD: NEGATIVE — SIGNIFICANT CHANGE UP
DRUG SCREEN 1, URINE RESULT: SIGNIFICANT CHANGE UP
EOSINOPHIL # BLD AUTO: 0.23 K/UL — SIGNIFICANT CHANGE UP (ref 0–0.7)
EOSINOPHIL NFR BLD AUTO: 2.7 % — SIGNIFICANT CHANGE UP (ref 0–8)
ETHANOL SERPL-MCNC: <10 MG/DL — SIGNIFICANT CHANGE UP
GLUCOSE SERPL-MCNC: 103 MG/DL — HIGH (ref 70–99)
GLUCOSE SERPL-MCNC: 104 MG/DL — HIGH (ref 70–99)
GLUCOSE SERPL-MCNC: 109 MG/DL — HIGH (ref 70–99)
GLUCOSE SERPL-MCNC: 114 MG/DL — HIGH (ref 70–99)
GLUCOSE SERPL-MCNC: 117 MG/DL — HIGH (ref 70–99)
GLUCOSE SERPL-MCNC: 119 MG/DL — HIGH (ref 70–99)
GLUCOSE UR QL: NEGATIVE MG/DL — SIGNIFICANT CHANGE UP
HCT VFR BLD CALC: 31.6 % — LOW (ref 42–52)
HGB BLD-MCNC: 11.7 G/DL — LOW (ref 14–18)
IMM GRANULOCYTES NFR BLD AUTO: 0.6 % — HIGH (ref 0.1–0.3)
KETONES UR-MCNC: NEGATIVE — SIGNIFICANT CHANGE UP
LEUKOCYTE ESTERASE UR-ACNC: NEGATIVE — SIGNIFICANT CHANGE UP
LYMPHOCYTES # BLD AUTO: 2.34 K/UL — SIGNIFICANT CHANGE UP (ref 1.2–3.4)
LYMPHOCYTES # BLD AUTO: 28 % — SIGNIFICANT CHANGE UP (ref 20.5–51.1)
MAGNESIUM SERPL-MCNC: 1.7 MG/DL — LOW (ref 1.8–2.4)
MCHC RBC-ENTMCNC: 28.3 PG — SIGNIFICANT CHANGE UP (ref 27–31)
MCHC RBC-ENTMCNC: 37 G/DL — SIGNIFICANT CHANGE UP (ref 32–37)
MCV RBC AUTO: 76.3 FL — LOW (ref 80–94)
METHADONE UR-MCNC: NEGATIVE — SIGNIFICANT CHANGE UP
MONOCYTES # BLD AUTO: 0.81 K/UL — HIGH (ref 0.1–0.6)
MONOCYTES NFR BLD AUTO: 9.7 % — HIGH (ref 1.7–9.3)
NEUTROPHILS # BLD AUTO: 4.9 K/UL — SIGNIFICANT CHANGE UP (ref 1.4–6.5)
NEUTROPHILS NFR BLD AUTO: 58.5 % — SIGNIFICANT CHANGE UP (ref 42.2–75.2)
NITRITE UR-MCNC: NEGATIVE — SIGNIFICANT CHANGE UP
NRBC # BLD: 0 /100 WBCS — SIGNIFICANT CHANGE UP (ref 0–0)
OPIATES UR-MCNC: NEGATIVE — SIGNIFICANT CHANGE UP
OSMOLALITY SERPL: 238 MOS/KG — LOW (ref 275–300)
OSMOLALITY UR: 54 MOS/KG — SIGNIFICANT CHANGE UP (ref 50–1200)
PCP UR-MCNC: NEGATIVE — SIGNIFICANT CHANGE UP
PH UR: 7 — SIGNIFICANT CHANGE UP (ref 5–8)
PLATELET # BLD AUTO: 173 K/UL — SIGNIFICANT CHANGE UP (ref 130–400)
POTASSIUM SERPL-MCNC: 2.8 MMOL/L — LOW (ref 3.5–5)
POTASSIUM SERPL-MCNC: 3.3 MMOL/L — LOW (ref 3.5–5)
POTASSIUM SERPL-MCNC: 3.4 MMOL/L — LOW (ref 3.5–5)
POTASSIUM SERPL-MCNC: 3.5 MMOL/L — SIGNIFICANT CHANGE UP (ref 3.5–5)
POTASSIUM SERPL-SCNC: 2.8 MMOL/L — LOW (ref 3.5–5)
POTASSIUM SERPL-SCNC: 3.3 MMOL/L — LOW (ref 3.5–5)
POTASSIUM SERPL-SCNC: 3.4 MMOL/L — LOW (ref 3.5–5)
POTASSIUM SERPL-SCNC: 3.5 MMOL/L — SIGNIFICANT CHANGE UP (ref 3.5–5)
PROPOXYPHENE QUALITATIVE URINE RESULT: NEGATIVE — SIGNIFICANT CHANGE UP
PROT SERPL-MCNC: 6.6 G/DL — SIGNIFICANT CHANGE UP (ref 6–8)
PROT SERPL-MCNC: 7.1 G/DL — SIGNIFICANT CHANGE UP (ref 6–8)
PROT UR-MCNC: NEGATIVE MG/DL — SIGNIFICANT CHANGE UP
RBC # BLD: 4.14 M/UL — LOW (ref 4.7–6.1)
RBC # FLD: 13.8 % — SIGNIFICANT CHANGE UP (ref 11.5–14.5)
SALICYLATES SERPL-MCNC: <0.3 MG/DL — LOW (ref 4–30)
SARS-COV-2 IGG+IGM SERPL QL IA: 5.11 U/ML — HIGH
SARS-COV-2 IGG+IGM SERPL QL IA: POSITIVE
SARS-COV-2 RNA SPEC QL NAA+PROBE: SIGNIFICANT CHANGE UP
SODIUM SERPL-SCNC: 111 MMOL/L — CRITICAL LOW (ref 135–146)
SODIUM SERPL-SCNC: 122 MMOL/L — LOW (ref 135–146)
SODIUM SERPL-SCNC: 122 MMOL/L — LOW (ref 135–146)
SODIUM SERPL-SCNC: 123 MMOL/L — LOW (ref 135–146)
SODIUM SERPL-SCNC: 123 MMOL/L — LOW (ref 135–146)
SODIUM SERPL-SCNC: 124 MMOL/L — LOW (ref 135–146)
SODIUM UR-SCNC: <20 MMOL/L — SIGNIFICANT CHANGE UP
SP GR SPEC: 1.01 — SIGNIFICANT CHANGE UP (ref 1.01–1.03)
THC UR QL: NEGATIVE — SIGNIFICANT CHANGE UP
TROPONIN T SERPL-MCNC: 0.02 NG/ML — HIGH
TROPONIN T SERPL-MCNC: 0.03 NG/ML — CRITICAL HIGH
UROBILINOGEN FLD QL: 0.2 MG/DL — SIGNIFICANT CHANGE UP (ref 0.2–0.2)
WBC # BLD: 8.37 K/UL — SIGNIFICANT CHANGE UP (ref 4.8–10.8)
WBC # FLD AUTO: 8.37 K/UL — SIGNIFICANT CHANGE UP (ref 4.8–10.8)

## 2021-07-08 PROCEDURE — 99222 1ST HOSP IP/OBS MODERATE 55: CPT

## 2021-07-08 PROCEDURE — 99233 SBSQ HOSP IP/OBS HIGH 50: CPT

## 2021-07-08 PROCEDURE — 99232 SBSQ HOSP IP/OBS MODERATE 35: CPT

## 2021-07-08 PROCEDURE — 93010 ELECTROCARDIOGRAM REPORT: CPT | Mod: 76

## 2021-07-08 PROCEDURE — 99285 EMERGENCY DEPT VISIT HI MDM: CPT | Mod: CS

## 2021-07-08 PROCEDURE — 99221 1ST HOSP IP/OBS SF/LOW 40: CPT

## 2021-07-08 PROCEDURE — 70450 CT HEAD/BRAIN W/O DYE: CPT | Mod: 26

## 2021-07-08 RX ORDER — SODIUM CHLORIDE 9 MG/ML
1000 INJECTION, SOLUTION INTRAVENOUS
Refills: 0 | Status: DISCONTINUED | OUTPATIENT
Start: 2021-07-08 | End: 2021-07-09

## 2021-07-08 RX ORDER — SODIUM CHLORIDE 9 MG/ML
1000 INJECTION, SOLUTION INTRAVENOUS
Refills: 0 | Status: DISCONTINUED | OUTPATIENT
Start: 2021-07-08 | End: 2021-07-08

## 2021-07-08 RX ORDER — AMLODIPINE BESYLATE 2.5 MG/1
10 TABLET ORAL AT BEDTIME
Refills: 0 | Status: DISCONTINUED | OUTPATIENT
Start: 2021-07-08 | End: 2021-07-12

## 2021-07-08 RX ORDER — POTASSIUM CHLORIDE 20 MEQ
20 PACKET (EA) ORAL ONCE
Refills: 0 | Status: COMPLETED | OUTPATIENT
Start: 2021-07-08 | End: 2021-07-08

## 2021-07-08 RX ORDER — CHLORHEXIDINE GLUCONATE 213 G/1000ML
1 SOLUTION TOPICAL EVERY 12 HOURS
Refills: 0 | Status: DISCONTINUED | OUTPATIENT
Start: 2021-07-08 | End: 2021-07-12

## 2021-07-08 RX ORDER — HALOPERIDOL DECANOATE 100 MG/ML
5 INJECTION INTRAMUSCULAR AT BEDTIME
Refills: 0 | Status: DISCONTINUED | OUTPATIENT
Start: 2021-07-08 | End: 2021-07-12

## 2021-07-08 RX ORDER — ASPIRIN/CALCIUM CARB/MAGNESIUM 324 MG
325 TABLET ORAL ONCE
Refills: 0 | Status: COMPLETED | OUTPATIENT
Start: 2021-07-08 | End: 2021-07-08

## 2021-07-08 RX ORDER — NICOTINE POLACRILEX 2 MG
1 GUM BUCCAL DAILY
Refills: 0 | Status: DISCONTINUED | OUTPATIENT
Start: 2021-07-08 | End: 2021-07-12

## 2021-07-08 RX ORDER — SODIUM CHLORIDE 9 MG/ML
1000 INJECTION INTRAMUSCULAR; INTRAVENOUS; SUBCUTANEOUS
Refills: 0 | Status: DISCONTINUED | OUTPATIENT
Start: 2021-07-08 | End: 2021-07-08

## 2021-07-08 RX ORDER — NYSTATIN CREAM 100000 [USP'U]/G
1 CREAM TOPICAL
Refills: 0 | Status: DISCONTINUED | OUTPATIENT
Start: 2021-07-08 | End: 2021-07-12

## 2021-07-08 RX ORDER — MAGNESIUM SULFATE 500 MG/ML
2 VIAL (ML) INJECTION ONCE
Refills: 0 | Status: COMPLETED | OUTPATIENT
Start: 2021-07-08 | End: 2021-07-08

## 2021-07-08 RX ORDER — ENOXAPARIN SODIUM 100 MG/ML
40 INJECTION SUBCUTANEOUS EVERY 12 HOURS
Refills: 0 | Status: DISCONTINUED | OUTPATIENT
Start: 2021-07-08 | End: 2021-07-08

## 2021-07-08 RX ORDER — LOSARTAN POTASSIUM 100 MG/1
100 TABLET, FILM COATED ORAL DAILY
Refills: 0 | Status: DISCONTINUED | OUTPATIENT
Start: 2021-07-08 | End: 2021-07-12

## 2021-07-08 RX ORDER — DESMOPRESSIN ACETATE 0.1 MG/1
2 TABLET ORAL ONCE
Refills: 0 | Status: COMPLETED | OUTPATIENT
Start: 2021-07-08 | End: 2021-07-08

## 2021-07-08 RX ORDER — POTASSIUM CHLORIDE 20 MEQ
20 PACKET (EA) ORAL ONCE
Refills: 0 | Status: COMPLETED | OUTPATIENT
Start: 2021-07-08 | End: 2021-07-09

## 2021-07-08 RX ORDER — POTASSIUM CHLORIDE 20 MEQ
40 PACKET (EA) ORAL ONCE
Refills: 0 | Status: COMPLETED | OUTPATIENT
Start: 2021-07-08 | End: 2021-07-08

## 2021-07-08 RX ORDER — DIVALPROEX SODIUM 500 MG/1
500 TABLET, DELAYED RELEASE ORAL
Refills: 0 | Status: DISCONTINUED | OUTPATIENT
Start: 2021-07-08 | End: 2021-07-12

## 2021-07-08 RX ORDER — ENOXAPARIN SODIUM 100 MG/ML
40 INJECTION SUBCUTANEOUS DAILY
Refills: 0 | Status: DISCONTINUED | OUTPATIENT
Start: 2021-07-08 | End: 2021-07-12

## 2021-07-08 RX ORDER — ASPIRIN/CALCIUM CARB/MAGNESIUM 324 MG
81 TABLET ORAL DAILY
Refills: 0 | Status: DISCONTINUED | OUTPATIENT
Start: 2021-07-08 | End: 2021-07-12

## 2021-07-08 RX ADMIN — Medication 50 MILLIEQUIVALENT(S): at 06:39

## 2021-07-08 RX ADMIN — Medication 1 PATCH: at 15:30

## 2021-07-08 RX ADMIN — DESMOPRESSIN ACETATE 2 MICROGRAM(S): 0.1 TABLET ORAL at 13:48

## 2021-07-08 RX ADMIN — Medication 325 MILLIGRAM(S): at 05:43

## 2021-07-08 RX ADMIN — Medication 15 MILLIGRAM(S): at 17:16

## 2021-07-08 RX ADMIN — Medication 1 PATCH: at 19:30

## 2021-07-08 RX ADMIN — Medication 81 MILLIGRAM(S): at 11:04

## 2021-07-08 RX ADMIN — AMLODIPINE BESYLATE 10 MILLIGRAM(S): 2.5 TABLET ORAL at 21:03

## 2021-07-08 RX ADMIN — Medication 40 MILLIEQUIVALENT(S): at 05:03

## 2021-07-08 RX ADMIN — Medication 50 MILLIEQUIVALENT(S): at 22:48

## 2021-07-08 RX ADMIN — DIVALPROEX SODIUM 500 MILLIGRAM(S): 500 TABLET, DELAYED RELEASE ORAL at 17:16

## 2021-07-08 RX ADMIN — NYSTATIN CREAM 1 APPLICATION(S): 100000 CREAM TOPICAL at 17:16

## 2021-07-08 RX ADMIN — CHLORHEXIDINE GLUCONATE 1 APPLICATION(S): 213 SOLUTION TOPICAL at 17:17

## 2021-07-08 RX ADMIN — SODIUM CHLORIDE 75 MILLILITER(S): 9 INJECTION INTRAMUSCULAR; INTRAVENOUS; SUBCUTANEOUS at 09:00

## 2021-07-08 RX ADMIN — ENOXAPARIN SODIUM 40 MILLIGRAM(S): 100 INJECTION SUBCUTANEOUS at 11:04

## 2021-07-08 RX ADMIN — SODIUM CHLORIDE 150 MILLILITER(S): 9 INJECTION, SOLUTION INTRAVENOUS at 18:51

## 2021-07-08 RX ADMIN — Medication 50 MILLIEQUIVALENT(S): at 11:05

## 2021-07-08 RX ADMIN — LOSARTAN POTASSIUM 100 MILLIGRAM(S): 100 TABLET, FILM COATED ORAL at 11:04

## 2021-07-08 RX ADMIN — Medication 50 GRAM(S): at 05:42

## 2021-07-08 RX ADMIN — HALOPERIDOL DECANOATE 5 MILLIGRAM(S): 100 INJECTION INTRAMUSCULAR at 21:03

## 2021-07-08 RX ADMIN — SODIUM CHLORIDE 150 MILLILITER(S): 9 INJECTION, SOLUTION INTRAVENOUS at 12:05

## 2021-07-08 NOTE — H&P ADULT - NSHPLABSRESULTS_GEN_ALL_CORE
11.7   8.37  )-----------( 173      ( 08 Jul 2021 02:55 )             31.6       07-08    111<LL>  |  77<L>  |  7<L>  ----------------------------<  109<H>  2.8<L>   |  26  |  0.6<L>    Ca    9.6      08 Jul 2021 02:55  Mg     1.7     07-08                        Lactate Trend      CARDIAC MARKERS ( 08 Jul 2021 04:00 )  x     / 0.03 ng/mL / x     / x     / x            CAPILLARY BLOOD GLUCOSE

## 2021-07-08 NOTE — ED PROVIDER NOTE - CARE PLAN
Principal Discharge DX:	Hyponatremia  Secondary Diagnosis:	Suicidal ideations  Secondary Diagnosis:	Troponin level elevated

## 2021-07-08 NOTE — BEHAVIORAL HEALTH ASSESSMENT NOTE - DETAILS
"to wash my clothes" as in HPI. Pt reports SI but at the same time makes future plans "to spend money on food and tobacco" and wants to change his residence.

## 2021-07-08 NOTE — PHARMACOTHERAPY INTERVENTION NOTE - COMMENTS
Called to [facility] to verified patient's medication. The phone number is [phone].   Verified medication information:  1. not taken - fluoxetine  2. dose needs to adjust - haloperidol 10 mg 1 t po hs                                     - buspirone 15 mg po tid  3. med not in the list - quetiapine 100 mg 1 t po hs  4. med pt takes correctly - divalproex 500 mg 1 t po bid ***Medication Reconciliation: Admission***    Called [facility] ([phone]) to obtain medication history.     Medication that pt is not taking:  -Fluoxetine    Medications that have different dosage ordered from history:  -Haloperidol 10mg tablet daily (inpatient order: 5mg daily)  -Buspirone 15mg TID (inpatient order: 15mg daily)     Medication that was held inpatient/not documented  -Quetiapine 100 mg 1 t po hs    Medication that pt was continued on at same dose:  -Divalproex 500 mg 1 t po bid ***Medication Reconciliation: Admission***    Called [facility] ([phone]) to obtain medication history.     Medication that pt is no longer taking:  -Fluoxetine    Medications that have different dosage ordered inpatient compared to history:  -Haloperidol 10mg tablet daily (inpatient order: 5mg daily)  -Buspirone 15mg TID (inpatient order: 15mg daily)     Medication that was held inpatient/not documented:  -Quetiapine 100 mg 1 t po hs    Medication that pt was continued on at same dose:  -Divalproex 500 mg 1 t po bid

## 2021-07-08 NOTE — H&P ADULT - HISTORY OF PRESENT ILLNESS
59 yo W male w/ PMH as noted below. Pt was brought in from assisted living facility ([facility]) for suicidal ideation. Initial labs from ER shows severe hyponatremia (111), severe hypokalemia (2.8), hypomagnesemia (1.7).  Pt is awake alert oriented x 3 in no acute distress.  Pt is on SSRI plus other psyche meds, w/  may be cause for sever hyponatremia. Pending negative COVID test pt is for admission to ICU.

## 2021-07-08 NOTE — ED PROVIDER NOTE - OBJECTIVE STATEMENT
59 yo M with PMH of schizophrenia, HLD, depression, HTN presenting for suicidal ideation. Patient states that he wants to cut his wrists. Denies ever having past suicide attempts. Denies homicidal ideation, AVH, drug/alcohol abuse. Denies any physical complaints including headache, vision changes, dizziness, chest pain, shortness of breath, nausea, vomiting, dysuria, hematuria, melena, hematochezia, recent cold/flu symptoms.

## 2021-07-08 NOTE — ED PROVIDER NOTE - CLINICAL SUMMARY MEDICAL DECISION MAKING FREE TEXT BOX
60yM pmhx schizophrenia, depression htn pw  suicidal ideation. plan to cut his wrists -   while in ED  .  pt  is dry , K 2.8 -  replaced  40PO  20IV. Mg 2gm given  ( level 1.7).   trop 0.03  EKG no jeff  pt  wihtout cp  sob diaphoresis   only complaint is he wants to commit suciide.  dw nephro -  bmp to be repeated after  K.    no 3% 2/2 overcorrection potential due to K replacement -    admitted to ICU

## 2021-07-08 NOTE — CONSULT NOTE ADULT - ASSESSMENT
IMPRESSION:  severe hyponatremia  suicidal ideations  hypokalemia  ho HTN  ho schizophrenia    PLAN:    CNS: avoid depressants. hold psych meds for now. psych eval. Ct head    HEENT: Oral care    PULMONARY:  HOB @ 45 degrees.  Aspiration precautions     CARDIOVASCULAR: Goal map >65.    GI: GI prophylaxis.  Feeding.  Bowel regimen     RENAL:  Follow up lytes.  Correct as needed. serum osm, urine osm, urine sodium, qround BMP.     INFECTIOUS DISEASE: Follow up cultures    HEMATOLOGICAL:  DVT prophylaxis.    ENDOCRINE:  Follow up FS.  Insulin protocol if needed.    MUSCULOSKELETAL: bedrest    Dispo: MICU             IMPRESSION:  severe hyponatremia poss psychogenic polydyspnea as all electrollytes/BUN/Creat low  suicidal ideations  hypokalemia  ho HTN  ho schizophrenia    PLAN:    CNS: avoid depressants.   hold psych meds for now.   psych eval.   Ct head    HEENT: Oral care    PULMONARY:  HOB @ 45 degrees.  Aspiration precautions   CXR    CARDIOVASCULAR: Goal map >65.    GI: GI prophylaxis.  Feeding.  Bowel regimen     RENAL:  Follow up lytes.  Correct as needed.   serum osm, urine osm, urine sodium,   q round BMP.     INFECTIOUS DISEASE: Follow up cultures    HEMATOLOGICAL:  DVT prophylaxis.    ENDOCRINE:  Follow up FS.  Insulin protocol if needed.    MUSCULOSKELETAL: bedrest    Dispo: MICU

## 2021-07-08 NOTE — BEHAVIORAL HEALTH ASSESSMENT NOTE - ORIENTED TO PLACE
Estiramientos de la espalda: Ejercicios - [ Back Stretches: Exercises ]  Instrucciones de cuidado  Aquí se presentan algunos ejemplos de ejercicios para estiramiento de la espalda. Empiece cada ejercicio lentamente. Reduzca la intensidad del ejercicio si Duwaine Mule a tener dolor. Brown médico o fisioterapeuta le dirán cuándo puede comenzar con estos ejercicios y cuáles funcionarán mejor para usted. Cómo hacer los ejercicios  Estiramiento sobre la faith    1. Párese cómodamente y separe los pies a la distancia de los hombros.  2. Sable Rice adelante, levante ambos brazos sobre brown [place] y trate de alcanzar el techo. No deje que la faith se incline Cat Neelima atrás. 3. Mantenga la posición sofia 15 a 30 segundos y [employer] a los lados. 4. Repita de 2 a 4 veces. Estiramiento lateral    1. Párese cómodamente y separe los pies a la distancia de los hombros.  2. Janes Song un brazo sobre la faith y luego inclínese hacia el otro lado. 3. Deslice brown mano por la pierna mientras anshul que el peso de brown brazo estire suavemente los músculos laterales. Mantenga la posición entre 15 y 27 segundos. 4. Repita de 2 a 4 veces de cada lado. Lagartijas    1. Acuéstese boca abajo, apoyando brown cuerpo con los antebrazos. 2. Ursula presión con los codos en el piso para elevar la espalda. Al hacer esto, relaje los músculos del estómago y permita que brown espalda se arquee sin usar los músculos de brown espalda. Al hacer presión, evite que diya caderas o la pelvis se separen del piso. 3. Mantenga la posición sofia 15 a 30 segundos y luego relájese. 4. Repita de 2 a 4 veces. Relajamiento y descanso    1. Acuéstese boca arriba con balaji toalla enrollada debajo de brown jarrod y [employer] almohada debajo de las rodillas. Extienda los brazos cómodamente a los lados. 2. Relájese y respire con normalidad. 3. Permanezca en esta posición sofia unos 10 minutos. 4. Si quiere, puede hacer [employer] 2 y 3 veces al día.   [address] seguimiento es balaji parte clave de brown tratamiento y seguridad. Asegúrese de hacer y acudir a todas las citas, y llame a brown médico si está teniendo problemas. También es balaji buena idea saber los resultados de los exámenes y mantener balaji lista de los medicamentos que dav. ¿Dónde puede encontrar más información en inglés? Rocco Leon a http://vilma-benjamín.info/. Alissa Ybarra H540 en la búsqueda para aprender más acerca de \"Estiramientos de la espalda: Ejercicios - [ Back Stretches: Exercises ]. \"  Revisado: 21 marzo, 2017  Versión del contenido: 11.4  © 8187-5598 [employer]wise, Inc[employer]. Las instrucciones de cuidado fueron adaptadas bajo licencia por [employer] (which disclaims liability or warranty for this information). Si usted tiene [place] afección médica o sobre estas instrucciones, siempre pregunte a brown profesional de lisa. Healthwise, Incorporated niega toda garantía o responsabilidad por brown uso de esta información. Yes

## 2021-07-08 NOTE — CONSULT NOTE ADULT - SUBJECTIVE AND OBJECTIVE BOX
Patient is a 60y old  Male who presents with a chief complaint of severe hyponatremia, (2021 05:54)      HPI:  59 yo W male w/ PMH as noted below. Pt was brought in from assisted living facility ([facility]) for suicidal ideation. Initial labs from ER shows severe hyponatremia (111), severe hypokalemia (2.8), hypomagnesemia (1.7).  Pt is awake alert oriented x 3 in no acute distress.  Pt is on SSRI plus other psyche meds, w/  may be cause for sever hyponatremia. Pending negative COVID test pt is for admission to ICU.  (2021 05:54)      PAST MEDICAL & SURGICAL HISTORY:  HTN (hypertension)    Depression    Hyperlipemia    Schizophrenia    No significant past surgical history        SOCIAL HX:   Smoking     2-3 cigars per day                    ETOH     denies                       Other    FAMILY HISTORY:  :  No known cardiovacular family hisotry     Review Of Systems:     All ROS are negative except per HPI       Allergies    tetanus toxoid (Swelling)    Intolerances          PHYSICAL EXAM    ICU Vital Signs Last 24 Hrs  T(C): 36.3 (2021 07:52), Max: 36.3 (2021 02:27)  T(F): 97.3 (2021 07:52), Max: 97.3 (2021 02:27)  HR: 65 (2021 07:52) (65 - 81)  BP: 160/75 (2021 07:52) (119/72 - 160/75)  BP(mean): --  ABP: --  ABP(mean): --  RR: 18 (2021 07:52) (18 - 18)  SpO2: 96% (2021 07:52) (96% - 98%)      CONSTITUTIONAL:  Well nourished.   NAD    ENT:   Airway patent,   Mouth with normal mucosa.   No thrush      CARDIAC:   Normal rate,   Regular rhythm.    No edema      RESPIRATORY:   No wheezing  Bilateral BS   Not tachypneic,  No use of accessory muscles    GASTROINTESTINAL:  Abdomen soft,   Non-tender,   No guarding,     NEUROLOGICAL:   confused  No motor deficits.    SKIN:   Skin normal color for race,   No evidence of rash.              21 @ 07:01  -  21 @ 09:02  --------------------------------------------------------  IN:  Total IN: 0 mL    OUT:    Voided (mL): 700 mL  Total OUT: 700 mL    Total NET: -700 mL          LABS:                          11.7   8.37  )-----------( 173      ( 2021 02:55 )             31.6                                               07-08    111<LL>  |  77<L>  |  7<L>  ----------------------------<  109<H>  2.8<L>   |  26  |  0.6<L>    Ca    9.6      2021 02:55  Mg     1.7                                                    Urinalysis Basic - ( 2021 08:00 )    Color: Yellow / Appearance: Clear / S.015 / pH: x  Gluc: x / Ketone: Negative  / Bili: Negative / Urobili: 0.2 mg/dL   Blood: x / Protein: Negative mg/dL / Nitrite: Negative   Leuk Esterase: Negative / RBC: x / WBC x   Sq Epi: x / Non Sq Epi: x / Bacteria: x        CARDIAC MARKERS ( 2021 04:00 )  x     / 0.03 ng/mL / x     / x     / x                                                                                                                                                                              X-Rays reviewed                                                                                     ECHO    CXR interpreted by me     MEDICATIONS  (STANDING):  amLODIPine   Tablet 10 milliGRAM(s) Oral at bedtime  aspirin enteric coated 81 milliGRAM(s) Oral daily  busPIRone 15 milliGRAM(s) Oral two times a day  chlorhexidine 4% Liquid 1 Application(s) Topical every 12 hours  diVALproex  milliGRAM(s) Oral two times a day  enoxaparin Injectable 40 milliGRAM(s) SubCutaneous daily  haloperidol     Tablet 5 milliGRAM(s) Oral at bedtime  losartan 100 milliGRAM(s) Oral daily  sodium chloride 0.9%. 1000 milliLiter(s) (75 mL/Hr) IV Continuous <Continuous>    MEDICATIONS  (PRN):         Patient is a 60y old  Male who presents with a chief complaint of severe hyponatremia, (2021 05:54)      HPI:  61 yo W male w/ PMH as noted below. Pt was brought in from assisted living facility ([facility]) for suicidal ideation. Initial labs from ER shows severe hyponatremia (111), severe hypokalemia (2.8), hypomagnesemia (1.7).  Pt is awake alert oriented x 3 in no acute distress.  Pt is on SSRI plus other psyche meds, w/  may be cause for sever hyponatremia.  (2021 05:54)      PAST MEDICAL & SURGICAL HISTORY:  HTN (hypertension)    Depression    Hyperlipemia    Schizophrenia    No significant past surgical history        SOCIAL HX:   Smoking     2-3 cigars per day                    ETOH     denies                       Other    FAMILY HISTORY:  :  No known cardiovacular family hisotry     Review Of Systems:     All ROS are negative except per HPI       Allergies    tetanus toxoid (Swelling)    Intolerances          PHYSICAL EXAM    ICU Vital Signs Last 24 Hrs  T(C): 36.3 (2021 07:52), Max: 36.3 (2021 02:27)  T(F): 97.3 (2021 07:52), Max: 97.3 (2021 02:27)  HR: 65 (2021 07:52) (65 - 81)  BP: 160/75 (2021 07:52) (119/72 - 160/75)  BP(mean): --  ABP: --  ABP(mean): --  RR: 18 (2021 07:52) (18 - 18)  SpO2: 96% (2021 07:52) (96% - 98%)      CONSTITUTIONAL:  Well nourished.   NAD    ENT:   Airway patent,   Mouth with normal mucosa.   No thrush      CARDIAC:   Normal rate,   Regular rhythm.    No edema      RESPIRATORY:   No wheezing  Bilateral BS   Not tachypneic,  No use of accessory muscles    GASTROINTESTINAL:  Abdomen soft,   Non-tender,   No guarding,     NEUROLOGICAL:   confused  No motor deficits.    SKIN:   Skin normal color for race,   No evidence of rash.              21 @ 07:01  -  21 @ 09:02  --------------------------------------------------------  IN:  Total IN: 0 mL    OUT:    Voided (mL): 700 mL  Total OUT: 700 mL    Total NET: -700 mL          LABS:                          11.7   8.37  )-----------( 173      ( 2021 02:55 )             31.6                                               07-08    111<LL>  |  77<L>  |  7<L>  ----------------------------<  109<H>  2.8<L>   |  26  |  0.6<L>    Ca    9.6      2021 02:55  Mg     1.7                                                    Urinalysis Basic - ( 2021 08:00 )    Color: Yellow / Appearance: Clear / S.015 / pH: x  Gluc: x / Ketone: Negative  / Bili: Negative / Urobili: 0.2 mg/dL   Blood: x / Protein: Negative mg/dL / Nitrite: Negative   Leuk Esterase: Negative / RBC: x / WBC x   Sq Epi: x / Non Sq Epi: x / Bacteria: x        CARDIAC MARKERS ( 2021 04:00 )  x     / 0.03 ng/mL / x     / x     / x                                                                                                                                                                              X-Rays reviewed                                                                                     ECHO    CXR interpreted by me     MEDICATIONS  (STANDING):  amLODIPine   Tablet 10 milliGRAM(s) Oral at bedtime  aspirin enteric coated 81 milliGRAM(s) Oral daily  busPIRone 15 milliGRAM(s) Oral two times a day  chlorhexidine 4% Liquid 1 Application(s) Topical every 12 hours  diVALproex  milliGRAM(s) Oral two times a day  enoxaparin Injectable 40 milliGRAM(s) SubCutaneous daily  haloperidol     Tablet 5 milliGRAM(s) Oral at bedtime  losartan 100 milliGRAM(s) Oral daily  sodium chloride 0.9%. 1000 milliLiter(s) (75 mL/Hr) IV Continuous <Continuous>    MEDICATIONS  (PRN):

## 2021-07-08 NOTE — BEHAVIORAL HEALTH ASSESSMENT NOTE - NSBHCHARTREVIEWVS_PSY_A_CORE FT
Vital Signs Last 24 Hrs  T(C): 37 (08 Jul 2021 15:00), Max: 37 (08 Jul 2021 15:00)  T(F): 98.6 (08 Jul 2021 15:00), Max: 98.6 (08 Jul 2021 15:00)  HR: 84 (08 Jul 2021 16:00) (64 - 84)  BP: 146/81 (08 Jul 2021 16:00) (118/53 - 169/87)  BP(mean): 106 (08 Jul 2021 16:00) (77 - 126)  RR: 20 (08 Jul 2021 16:00) (13 - 20)  SpO2: 97% (08 Jul 2021 16:00) (94% - 98%)

## 2021-07-08 NOTE — BEHAVIORAL HEALTH ASSESSMENT NOTE - SUMMARY
61yo m with h/o schizoaffective d/o admitted to ICU due to hyponatremia and elevated troponin, presents with disorganised t/p, vague AH, and SI with thoughts of cutting self. Pt reports that he had these SI for "longer than a year", and never acted on them. He is unable to identify triggers but is focused on moving to [place] where he reportedly can have more comfort then in his current residence. This presentation is very similar to his prior presentations to this hospital. According to [facility] staff, there was no recent change in pt's presentation, no signs of worsening depression, no recent medication change, no suicidal statements at the residence. Although pt reports SI at times he never acted on SI while at [facility].

## 2021-07-08 NOTE — BEHAVIORAL HEALTH ASSESSMENT NOTE - NSBHCHARTREVIEWINVESTIGATE_PSY_A_CORE FT
Ventricular Rate 64 BPM    Atrial Rate 64 BPM    P-R Interval 252 ms    QRS Duration 108 ms    Q-T Interval 494 ms    QTC Calculation(Bazett) 509 ms    P Axis 62 degrees    R Axis -9 degrees    T Axis 17 degrees    Diagnosis Line Sinus rhythm with 1st degree A-V block    Confirmed by ARACELI DRIVER MD ([id number]) on 7/8/2021 12:48:44 PM

## 2021-07-08 NOTE — BEHAVIORAL HEALTH ASSESSMENT NOTE - PROBLEM SELECTOR PLAN 1
Continue VPA 500mg BID, check level.  Continue Haldol 5mg HS, monitor QTc  Can consider to discontinue BuSpar as there is no indications or obvious benefits.  Can d/c 1:1 observation as no acute suicidal risk.

## 2021-07-08 NOTE — ED PROVIDER NOTE - PHYSICAL EXAMINATION
16-Aug-2020 04:54 CONSTITUTIONAL: well-appearing, in NAD  SKIN: Warm dry, normal skin turgor  HEAD: NCAT  EYES: EOMI, PERRLA, no scleral icterus, conjunctiva pink  ENT: normal pharynx with no erythema or exudates  NECK: Supple; non tender. Full ROM.  CARD: RRR, no murmurs.  RESP: clear to ausculation b/l. No crackles or wheezing.  ABD: soft, non-tender, non-distended, no rebound or guarding.  EXT: Full ROM, no bony tenderness, no pedal edema, no calf tenderness  NEURO: normal motor. normal sensory. Normal gait.  PSYCH: Cooperative, appropriate.

## 2021-07-08 NOTE — BEHAVIORAL HEALTH ASSESSMENT NOTE - RISK ASSESSMENT
Low Acute Suicide Risk Pt has chronic SI on which he never acted. He is interested in improving his living conditions and transfer to [place], which would be unlikely in acutely suicidal person. Pt does not appear to be profoundly depressed and describes his mood as "not too bad, not too good". Protective factors include stability of housing, support from staff at residence, no h/o self injurious behavior, no substance use.

## 2021-07-08 NOTE — PHARMACOTHERAPY INTERVENTION NOTE - COMMENTS
In the process of ruling out SIADH; rec to consider holding buspirone and haloperidol as both of these agents can be the etiology of SIADH

## 2021-07-08 NOTE — H&P ADULT - ASSESSMENT
-severe hyponatremia most likely secondary to SSRI  -severe hypokalemia (2.8) r/o lab error -  -hypomagnesemia (1.7)  -sucidal ideation  - Hx- schitophrenia, depression, HTN    discussed w/ ICU MD Virgen  admit to ICU  pt to receive 1 liter LR (per ER MD)  pt to receive KCL supplementation (40 MEQ)   repeat BMP in case of lab error  renal consult  PSYCHE consult for possible hold of SSRI

## 2021-07-08 NOTE — BEHAVIORAL HEALTH ASSESSMENT NOTE - HPI (INCLUDE ILLNESS QUALITY, SEVERITY, DURATION, TIMING, CONTEXT, MODIFYING FACTORS, ASSOCIATED SIGNS AND SYMPTOMS)
Pt is a 59yo m, residing in [facility] with a long h/o mental illness, prior IPP ( his last admission was to our IPP from 8/8/19 to 9/6/19, prior to that was admitted also to our IPP from 7/17/19 to 8/5/19 ), no prior SAs, on psych meds, carries a diagnosis of SAD, who came to ED c/o SI with thoughts of cutting self with scissors, and was hospitalised to ICU due to severe hyponatremia (sodium 111 in ED) and increased troponin. Pt was seen, chart reviewed. Pt is a poor historian due to thought disorder. He reports having SI with thoughts of cutting for "longer than a year", and is unable to identify why he is having these thoughts. He attributes SI to his "medications being changed in 2019", but is unable to say what exactly was changed. Pt makes future plans to "go back home spend money from my account on tobacco and food". He also want to be transferred from his residency to [place] "because there I can have all my clothes, shoes, and more money out of my income." Pt reports AH of "my roommate Chadwick Green", including CAH "to wash my clothes", also describing AH as "fantasies". When asked about the reason for current admission he said "I need to get this liquid into my anatomy" (pointing at IV line). He is A,Ox3.  Of note, as per EMR, this presentation is very similar to pt's former presentations to this hospital. I called [facility] ([phone]) and discussed pt with sebastian Frey. According to him, there was no recent change in pt's presentation, no signs of worsening depression, no recent medication change, no suicidal statements at the residence. Although pt reports SI at times he never acted on SI to his knowledge.

## 2021-07-08 NOTE — PROGRESS NOTE ADULT - ASSESSMENT
59 yo M with PMH of schizophrenia, HLD, depression, HTN presenting for suicidal ideation. Patient states that he wants to cut his wrists, pt found with hyponatremia    hyponatremia possibly secondary to psychogenic polydipsia / suicidal ideation     - fluid restrict   - nephrology   - cleared by psych

## 2021-07-08 NOTE — BEHAVIORAL HEALTH ASSESSMENT NOTE - NSBHCHARTREVIEWIMAGING_PSY_A_CORE FT
EXAM:  CT BRAIN          PROCEDURE DATE:  07/08/2021      INTERPRETATION:  CLINICAL INDICATION: Altered mental status    TECHNIQUE: CT of the head was performed without the administration of intravenous contrast.    COMPARISON: CT head dated 7/11/2017.    FINDINGS:    There is prominence of the sulci, sylvian fissures, and ventricles, reflecting stable mild diffuse parenchymal volume loss.     There is no evidence of acute territorial/transcortical infarction or intracranial hemorrhage. There is no space-occupying lesion or midline shift.    There is no evidence of hydrocephalus. There are no extra-axial fluid collections.    The visualized intraorbital contents are normal. There is mild inflammatory mucosal thickening of the ethmoid sinuses.The mastoid air cells are aerated. The visualized soft tissues and osseous structures appear normal. Partially visualized parotid glands are normal.      IMPRESSION:    No acute intracranial pathology.      --- End of Report ---    NETO JONAS MD; Attending Radiologist  This document has been electronically signed. Jul 8 2021  2:42PM

## 2021-07-08 NOTE — PROGRESS NOTE ADULT - SUBJECTIVE AND OBJECTIVE BOX
Patient seen and evaluated this am, pt drinking water with pitcher at bedside, reports drinking alot of water       T(F): 98.6 (07-08-21 @ 15:00), Max: 98.6 (07-08-21 @ 15:00)  HR: 81 (07-08-21 @ 18:00)  BP: 146/87 (07-08-21 @ 18:00)  RR: 20  SpO2: 98% (07-08-21 @ 18:00) (94% - 98%)    PHYSICAL EXAM:  GENERAL: NAD  HEAD:  Atraumatic, Normocephalic  EYES: EOMI, PERRLA, conjunctiva and sclera clear  NERVOUS SYSTEM:   no focal deficits   CHEST/LUNG: Clear to percussion bilaterally; No rales, rhonchi, wheezing, or rubs  HEART: Regular rate and rhythm; No murmurs, rubs, or gallops  ABDOMEN: Soft, Nontender, Nondistended; Bowel sounds present  EXTREMITIES:  2+ Peripheral Pulses, No clubbing, cyanosis, or edema    LABS  07-08    123<L>  |  89<L>  |  6<L>  ----------------------------<  117<H>  3.5   |  27  |  0.6<L>    Ca    9.4      08 Jul 2021 15:18  Mg     1.7     07-08    TPro  7.1  /  Alb  4.5  /  TBili  0.3  /  DBili  x   /  AST  13  /  ALT  6   /  AlkPhos  83  07-08                          11.7   8.37  )-----------( 173      ( 08 Jul 2021 02:55 )             31.6         CARDIAC ENZYMES      Troponin T, Serum: 0.02 ng/mL (07-08-21 @ 11:16)  Troponin T, Serum: 0.03 ng/mL (07-08-21 @ 04:00)    < from: 12 Lead ECG (07.08.21 @ 05:34) >  Diagnosis Line Sinus rhythm with 1st degree A-V block    < end of copied text >      RADIOLOGY  < from: CT Head No Cont (07.08.21 @ 10:52) >    IMPRESSION:    No acute intracranial pathology.    < end of copied text >    MEDICATIONS  (STANDING):  amLODIPine   Tablet 10 milliGRAM(s) Oral at bedtime  aspirin enteric coated 81 milliGRAM(s) Oral daily  busPIRone 15 milliGRAM(s) Oral two times a day  chlorhexidine 4% Liquid 1 Application(s) Topical every 12 hours  dextrose 5%. 1000 milliLiter(s) (150 mL/Hr) IV Continuous <Continuous>  diVALproex  milliGRAM(s) Oral two times a day  enoxaparin Injectable 40 milliGRAM(s) SubCutaneous daily  haloperidol     Tablet 5 milliGRAM(s) Oral at bedtime  losartan 100 milliGRAM(s) Oral daily  nicotine -  14 mG/24Hr(s) Patch 1 patch Transdermal daily  nystatin Powder 1 Application(s) Topical two times a day    MEDICATIONS  (PRN):

## 2021-07-08 NOTE — CONSULT NOTE ADULT - ATTENDING COMMENTS
Attending Statement: I have personally performed a face to face diagnostic evaluation on this patient. The patient is suffering from severe hyponatremia .  I have reviewed the above note and agree with the history, exam and suggestions for care, except as I have noted in the text. I have amended the treatment plans as necessary.

## 2021-07-08 NOTE — CONSULT NOTE ADULT - ASSESSMENT
- repeat Na/K level stat, and q2-4 hrs  - check urine osm, serum osm   - hold iv fluids, document strict i/o  - goal Na correction is 4-6meq in the first 24hrs  - does not need 3% saline    Call [phone] with any updates/questions.   - repeat Na/K level stat, and q2-4 hrs  - low urine Na/urine osm c/w psychogenic polydipsia  - hold iv fluids  - needs burroughs catheter for strict i/o  - goal Na correction is 4-6meq in the first 24hrs, will overcorrect - will need d5w/ddavp   - does not need 3% saline    Call [phone] with any updates/questions.   # Euvolemic hyponatremia, hypo-osmolar  # HTN  Recommendations:  - treat as chronic hyponatremia  - repeat Na/K level stat, and q2-4 hrs  - low urine Na/urine osm c/w psychogenic polydipsia, poor solute intake  - hold NS, start D5w at 150cc/hr, ddavp 2mcg x1  - needs burroughs catheter for strict i/o  - increase rate of d5w if Na correction higher than goal  - goal Na correction is 4-8meq in the first 24hrs  - does not need 3% saline  - check TSH level  - encourage solute intake, protein  - replete Mg to 2, K to 4 as needed  - cont amlodipine    D/w ICU fellow  Call [phone] with any updates/questions.

## 2021-07-08 NOTE — CONSULT NOTE ADULT - SUBJECTIVE AND OBJECTIVE BOX
NEPHROLOGY CONSULTATION NOTE    SAM REA  60y  Male  MRN-[id number]    CC:   Patient is a 60y old  Male who presents with a chief complaint of severe hyponatremia, (2021 09:00)      HPI:  59 yo W male w/ PMH as noted below. Pt was brought in from assisted living facility ([place]) for suicidal ideation. Initial labs from ER shows severe hyponatremia (111), severe hypokalemia (2.8), hypomagnesemia (1.7).  Pt is awake alert oriented x 3 in no acute distress.  Pt is on SSRI plus other psyche meds, w/  may be cause for sever hyponatremia. Pending negative COVID test pt is for admission to ICU.  (2021 05:54)      PAST MEDICAL & SURGICAL HISTORY:  HTN (hypertension)    Depression    Hyperlipemia    Schizophrenia    No significant past surgical history      Allergies:  tetanus toxoid (Swelling)    Home Medications Reviewed  Hospital Medications:   MEDICATIONS  (STANDING):  amLODIPine   Tablet 10 milliGRAM(s) Oral at bedtime  aspirin enteric coated 81 milliGRAM(s) Oral daily  busPIRone 15 milliGRAM(s) Oral two times a day  chlorhexidine 4% Liquid 1 Application(s) Topical every 12 hours  diVALproex  milliGRAM(s) Oral two times a day  enoxaparin Injectable 40 milliGRAM(s) SubCutaneous daily  haloperidol     Tablet 5 milliGRAM(s) Oral at bedtime  losartan 100 milliGRAM(s) Oral daily  sodium chloride 0.9%. 1000 milliLiter(s) (75 mL/Hr) IV Continuous <Continuous>      Home Medications:      SOCIAL HISTORY:  Social History:      FAMILY HISTORY:      REVIEW OF SYSTEMS:   All other review of systems is negative unless indicated above.    VITALS:  T(F): 97.3 (21 @ 07:52), Max: 97.3 (21 @ 02:27)  HR: 65 (21 @ 07:52)  BP: 160/75 (21 @ 07:52)  RR: 18 (21 @ 07:52)  SpO2: 96% (21 @ 07:52)    Height (cm): 177.8 ( 07:52)  Weight (kg): 123.3 ( 07:52)  BMI (kg/m2): 39 ( @ 07:52)  BSA (m2): 2.38 ( @ 07:52)  I&O's Detail    2021 07:  -  2021 09:47  --------------------------------------------------------  IN:  Total IN: 0 mL    OUT:    Voided (mL): 700 mL  Total OUT: 700 mL    Total NET: -700 mL            I&O's Summary    2021 07:  -  2021 09:47  --------------------------------------------------------  IN: 0 mL / OUT: 700 mL / NET: -700 mL        PHYSICAL EXAM:  Gen: NAD  resp:   card: S1/S2  abd: soft  ext: no edema  vascular access:     LABS:  Daily Height in cm: 177.8 (2021 07:52)        07-08    111<LL>  |  77<L>  |  7<L>  ----------------------------<  109<H>  2.8<L>   |  26  |  0.6<L>    Ca    9.6      2021 02:55  Mg     1.7           eGFR if Non African American: 110 mL/min/1.73M2 (21 @ 02:55)  eGFR if African American: 128 mL/min/1.73M2 (21 @ 02:55)      Creatinine Trend:   Creatinine, Serum: 0.6 mg/dL (21 @ 02:55)  Creatinine, Serum: 0.8 mg/dL (19 @ 20:51)  Creatinine, Serum: 0.9 mg/dL (19 @ 19:30)                            11.7   8.37  )-----------( 173      ( 2021 02:55 )             31.6     Mean Cell Volume: 76.3 fL (21 @ 02:55)    Urine Studies:  Urinalysis Basic - ( 2021 08:00 )    Color: Yellow / Appearance: Clear / S.015 / pH:   Gluc:  / Ketone: Negative  / Bili: Negative / Urobili: 0.2 mg/dL   Blood:  / Protein: Negative mg/dL / Nitrite: Negative   Leuk Esterase: Negative / RBC:  / WBC    Sq Epi:  / Non Sq Epi:  / Bacteria:       Sodium, Random Urine: <20.0 mmoL/L ( @ 08:00)        RADIOLOGY & ADDITIONAL STUDIES:                           NEPHROLOGY CONSULTATION NOTE    SAM REA  60y  Male  MRN-[id number]    CC:   Patient is a 60y old  Male who presents with a chief complaint of severe hyponatremia, (2021 09:00)      HPI:  61 yo W male w/ PMH as noted below. Pt was brought in from assisted living facility ([place]) for suicidal ideation. Initial labs from ER shows severe hyponatremia (111), severe hypokalemia (2.8), hypomagnesemia (1.7).  Pt is awake alert oriented x 3 in no acute distress.  Pt is on SSRI plus other psyche meds, w/  may be cause for sever hyponatremia. Pending negative COVID test pt is for admission to ICU.  (2021 05:54)      PAST MEDICAL & SURGICAL HISTORY:  HTN (hypertension)    Depression    Hyperlipemia    Schizophrenia    No significant past surgical history      Allergies:  tetanus toxoid (Swelling)      Hospital Medications:   MEDICATIONS  (STANDING):  amLODIPine   Tablet 10 milliGRAM(s) Oral at bedtime  aspirin enteric coated 81 milliGRAM(s) Oral daily  busPIRone 15 milliGRAM(s) Oral two times a day  chlorhexidine 4% Liquid 1 Application(s) Topical every 12 hours  diVALproex  milliGRAM(s) Oral two times a day  enoxaparin Injectable 40 milliGRAM(s) SubCutaneous daily  haloperidol     Tablet 5 milliGRAM(s) Oral at bedtime  losartan 100 milliGRAM(s) Oral daily  sodium chloride 0.9%. 1000 milliLiter(s) (75 mL/Hr) IV Continuous <Continuous>    Home medications reviewed.        SOCIAL HISTORY:  Social History:  n/a    FAMILY HISTORY:  non contributory    REVIEW OF SYSTEMS:   All other review of systems is negative unless indicated above.    VITALS:  T(F): 97.3 (21 @ 07:52), Max: 97.3 (21 @ 02:27)  HR: 65 (21 @ 07:52)  BP: 160/75 (21 @ 07:52)  RR: 18 (21 @ 07:52)  SpO2: 96% (21 @ 07:52)    Height (cm): 177.8 ( 07:52)  Weight (kg): 123.3 ( 07:52)  BMI (kg/m2): 39 ( @ 07:52)  BSA (m2): 2.38 ( @ 07:52)  I&O's Detail    2021 07:01  -  2021 09:47  --------------------------------------------------------  IN:  Total IN: 0 mL    OUT:    Voided (mL): 700 mL  Total OUT: 700 mL    Total NET: -700 mL            I&O's Summary    2021 07:01  -  2021 09:47  --------------------------------------------------------  IN: 0 mL / OUT: 700 mL / NET: -700 mL        PHYSICAL EXAM:  Gen: NAD  resp: CTAB  card: S1/S2  abd: soft  ext: no edema      LABS:  Daily Height in cm: 177.8 (2021 07:52)        07-08    111<LL>  |  77<L>  |  7<L>  ----------------------------<  109<H>  2.8<L>   |  26  |  0.6<L>    Ca    9.6      2021 02:55  Mg     1.7           eGFR if Non African American: 110 mL/min/1.73M2 (21 @ 02:55)  eGFR if African American: 128 mL/min/1.73M2 (21 @ 02:55)      Creatinine Trend:   Creatinine, Serum: 0.6 mg/dL (21 @ 02:55)  Creatinine, Serum: 0.8 mg/dL (19 @ 20:51)  Creatinine, Serum: 0.9 mg/dL (19 @ 19:30)                            11.7   8.37  )-----------( 173      ( 2021 02:55 )             31.6     Mean Cell Volume: 76.3 fL (21 @ 02:55)    Urine Studies:  Urinalysis Basic - ( 2021 08:00 )    Color: Yellow / Appearance: Clear / S.015 / pH:   Gluc:  / Ketone: Negative  / Bili: Negative / Urobili: 0.2 mg/dL   Blood:  / Protein: Negative mg/dL / Nitrite: Negative   Leuk Esterase: Negative / RBC:  / WBC    Sq Epi:  / Non Sq Epi:  / Bacteria:       Sodium, Random Urine: <20.0 mmoL/L ( @ 08:00)        RADIOLOGY & ADDITIONAL STUDIES:

## 2021-07-08 NOTE — H&P ADULT - NSHPPHYSICALEXAM_GEN_ALL_CORE
GENERAL:  59y/o Obese W Male NAD, resting comfortably.  HEAD:  Atraumatic, Normocephalic  EYES: EOMI, PERRLA, conjunctiva and sclera clear  NECK: Supple, No JVD, no cervical lymphadenopathy, non-tender  CHEST/LUNG: Clear to auscultation bilaterally; No wheeze, rhonchi, or rales  HEART: Regular rate and rhythm; S1&S2  ABDOMEN: Soft, obese Nontender, Nondistended x 4 quadrants; Bowel sounds present  EXTREMITIES:   Peripheral Pulses Present, No clubbing, no cyanosis, or no edema, no calf tenderness  PSYCH: AAOx3, cooperative, appropriate  NEUROLOGY: WNL  SKIN: WNL

## 2021-07-08 NOTE — PATIENT PROFILE ADULT - DO YOU LACK THE NECESSARY SUPPORT TO HELP YOU COPE WITH LIFE CHALLENGES?
Crohn's disease    Iron deficiency anemia due to chronic blood loss    Ulcerative colitis  and/or Crohn's no

## 2021-07-08 NOTE — BEHAVIORAL HEALTH ASSESSMENT NOTE - NSBHCHARTREVIEWLAB_PSY_A_CORE FT
11.7   8.37  )-----------( 173      ( 08 Jul 2021 02:55 )             31.6   07-08    123<L>  |  89<L>  |  6<L>  ----------------------------<  117<H>  3.5   |  27  |  0.6<L>    Ca    9.4      08 Jul 2021 15:18  Mg     1.7     07-08    TPro  7.1  /  Alb  4.5  /  TBili  0.3  /  DBili  x   /  AST  13  /  ALT  6   /  AlkPhos  83  07-08

## 2021-07-09 LAB
ALBUMIN SERPL ELPH-MCNC: 3.7 G/DL — SIGNIFICANT CHANGE UP (ref 3.5–5.2)
ALP SERPL-CCNC: 62 U/L — SIGNIFICANT CHANGE UP (ref 30–115)
ALT FLD-CCNC: <5 U/L — SIGNIFICANT CHANGE UP (ref 0–41)
ANION GAP SERPL CALC-SCNC: 10 MMOL/L — SIGNIFICANT CHANGE UP (ref 7–14)
ANION GAP SERPL CALC-SCNC: 10 MMOL/L — SIGNIFICANT CHANGE UP (ref 7–14)
ANION GAP SERPL CALC-SCNC: 9 MMOL/L — SIGNIFICANT CHANGE UP (ref 7–14)
AST SERPL-CCNC: 10 U/L — SIGNIFICANT CHANGE UP (ref 0–41)
BASOPHILS # BLD AUTO: 0.05 K/UL — SIGNIFICANT CHANGE UP (ref 0–0.2)
BASOPHILS NFR BLD AUTO: 0.6 % — SIGNIFICANT CHANGE UP (ref 0–1)
BILIRUB SERPL-MCNC: 0.3 MG/DL — SIGNIFICANT CHANGE UP (ref 0.2–1.2)
BUN SERPL-MCNC: 5 MG/DL — LOW (ref 10–20)
BUN SERPL-MCNC: 5 MG/DL — LOW (ref 10–20)
BUN SERPL-MCNC: 6 MG/DL — LOW (ref 10–20)
BUN SERPL-MCNC: 6 MG/DL — LOW (ref 10–20)
BUN SERPL-MCNC: 7 MG/DL — LOW (ref 10–20)
CALCIUM SERPL-MCNC: 9 MG/DL — SIGNIFICANT CHANGE UP (ref 8.5–10.1)
CALCIUM SERPL-MCNC: 9.2 MG/DL — SIGNIFICANT CHANGE UP (ref 8.5–10.1)
CALCIUM SERPL-MCNC: 9.4 MG/DL — SIGNIFICANT CHANGE UP (ref 8.5–10.1)
CALCIUM SERPL-MCNC: 9.6 MG/DL — SIGNIFICANT CHANGE UP (ref 8.5–10.1)
CALCIUM SERPL-MCNC: 9.8 MG/DL — SIGNIFICANT CHANGE UP (ref 8.5–10.1)
CHLORIDE SERPL-SCNC: 81 MMOL/L — LOW (ref 98–110)
CHLORIDE SERPL-SCNC: 83 MMOL/L — LOW (ref 98–110)
CHLORIDE SERPL-SCNC: 84 MMOL/L — LOW (ref 98–110)
CHLORIDE SERPL-SCNC: 85 MMOL/L — LOW (ref 98–110)
CHLORIDE SERPL-SCNC: 87 MMOL/L — LOW (ref 98–110)
CO2 SERPL-SCNC: 25 MMOL/L — SIGNIFICANT CHANGE UP (ref 17–32)
CO2 SERPL-SCNC: 25 MMOL/L — SIGNIFICANT CHANGE UP (ref 17–32)
CO2 SERPL-SCNC: 27 MMOL/L — SIGNIFICANT CHANGE UP (ref 17–32)
CO2 SERPL-SCNC: 27 MMOL/L — SIGNIFICANT CHANGE UP (ref 17–32)
CO2 SERPL-SCNC: 28 MMOL/L — SIGNIFICANT CHANGE UP (ref 17–32)
CREAT SERPL-MCNC: 0.5 MG/DL — LOW (ref 0.7–1.5)
CREAT SERPL-MCNC: 0.6 MG/DL — LOW (ref 0.7–1.5)
CREAT SERPL-MCNC: 0.7 MG/DL — SIGNIFICANT CHANGE UP (ref 0.7–1.5)
EOSINOPHIL # BLD AUTO: 0.22 K/UL — SIGNIFICANT CHANGE UP (ref 0–0.7)
EOSINOPHIL NFR BLD AUTO: 2.8 % — SIGNIFICANT CHANGE UP (ref 0–8)
GLUCOSE SERPL-MCNC: 103 MG/DL — HIGH (ref 70–99)
GLUCOSE SERPL-MCNC: 107 MG/DL — HIGH (ref 70–99)
GLUCOSE SERPL-MCNC: 112 MG/DL — HIGH (ref 70–99)
GLUCOSE SERPL-MCNC: 124 MG/DL — HIGH (ref 70–99)
GLUCOSE SERPL-MCNC: 128 MG/DL — HIGH (ref 70–99)
HCT VFR BLD CALC: 34.1 % — LOW (ref 42–52)
HGB BLD-MCNC: 12.3 G/DL — LOW (ref 14–18)
IMM GRANULOCYTES NFR BLD AUTO: 0.4 % — HIGH (ref 0.1–0.3)
LYMPHOCYTES # BLD AUTO: 1.71 K/UL — SIGNIFICANT CHANGE UP (ref 1.2–3.4)
LYMPHOCYTES # BLD AUTO: 21.6 % — SIGNIFICANT CHANGE UP (ref 20.5–51.1)
MAGNESIUM SERPL-MCNC: 1.9 MG/DL — SIGNIFICANT CHANGE UP (ref 1.8–2.4)
MCHC RBC-ENTMCNC: 28.3 PG — SIGNIFICANT CHANGE UP (ref 27–31)
MCHC RBC-ENTMCNC: 36.1 G/DL — SIGNIFICANT CHANGE UP (ref 32–37)
MCV RBC AUTO: 78.6 FL — LOW (ref 80–94)
MONOCYTES # BLD AUTO: 0.77 K/UL — HIGH (ref 0.1–0.6)
MONOCYTES NFR BLD AUTO: 9.7 % — HIGH (ref 1.7–9.3)
NEUTROPHILS # BLD AUTO: 5.12 K/UL — SIGNIFICANT CHANGE UP (ref 1.4–6.5)
NEUTROPHILS NFR BLD AUTO: 64.9 % — SIGNIFICANT CHANGE UP (ref 42.2–75.2)
NRBC # BLD: 0 /100 WBCS — SIGNIFICANT CHANGE UP (ref 0–0)
PLATELET # BLD AUTO: 177 K/UL — SIGNIFICANT CHANGE UP (ref 130–400)
POTASSIUM SERPL-MCNC: 3.4 MMOL/L — LOW (ref 3.5–5)
POTASSIUM SERPL-MCNC: 3.7 MMOL/L — SIGNIFICANT CHANGE UP (ref 3.5–5)
POTASSIUM SERPL-MCNC: 3.8 MMOL/L — SIGNIFICANT CHANGE UP (ref 3.5–5)
POTASSIUM SERPL-MCNC: 3.9 MMOL/L — SIGNIFICANT CHANGE UP (ref 3.5–5)
POTASSIUM SERPL-MCNC: 4.2 MMOL/L — SIGNIFICANT CHANGE UP (ref 3.5–5)
POTASSIUM SERPL-SCNC: 3.4 MMOL/L — LOW (ref 3.5–5)
POTASSIUM SERPL-SCNC: 3.7 MMOL/L — SIGNIFICANT CHANGE UP (ref 3.5–5)
POTASSIUM SERPL-SCNC: 3.8 MMOL/L — SIGNIFICANT CHANGE UP (ref 3.5–5)
POTASSIUM SERPL-SCNC: 3.9 MMOL/L — SIGNIFICANT CHANGE UP (ref 3.5–5)
POTASSIUM SERPL-SCNC: 4.2 MMOL/L — SIGNIFICANT CHANGE UP (ref 3.5–5)
PROT SERPL-MCNC: 6 G/DL — SIGNIFICANT CHANGE UP (ref 6–8)
RBC # BLD: 4.34 M/UL — LOW (ref 4.7–6.1)
RBC # FLD: 13.8 % — SIGNIFICANT CHANGE UP (ref 11.5–14.5)
SODIUM SERPL-SCNC: 118 MMOL/L — CRITICAL LOW (ref 135–146)
SODIUM SERPL-SCNC: 121 MMOL/L — LOW (ref 135–146)
SODIUM SERPL-SCNC: 124 MMOL/L — LOW (ref 135–146)
VALPROATE SERPL-MCNC: 66 UG/ML — SIGNIFICANT CHANGE UP (ref 50–100)
WBC # BLD: 7.9 K/UL — SIGNIFICANT CHANGE UP (ref 4.8–10.8)
WBC # FLD AUTO: 7.9 K/UL — SIGNIFICANT CHANGE UP (ref 4.8–10.8)

## 2021-07-09 PROCEDURE — 99233 SBSQ HOSP IP/OBS HIGH 50: CPT

## 2021-07-09 PROCEDURE — 99232 SBSQ HOSP IP/OBS MODERATE 35: CPT

## 2021-07-09 PROCEDURE — 93010 ELECTROCARDIOGRAM REPORT: CPT

## 2021-07-09 RX ORDER — LABETALOL HCL 100 MG
100 TABLET ORAL
Refills: 0 | Status: DISCONTINUED | OUTPATIENT
Start: 2021-07-09 | End: 2021-07-09

## 2021-07-09 RX ORDER — QUETIAPINE FUMARATE 200 MG/1
100 TABLET, FILM COATED ORAL AT BEDTIME
Refills: 0 | Status: DISCONTINUED | OUTPATIENT
Start: 2021-07-09 | End: 2021-07-09

## 2021-07-09 RX ORDER — SODIUM CHLORIDE 9 MG/ML
1000 INJECTION, SOLUTION INTRAVENOUS
Refills: 0 | Status: DISCONTINUED | OUTPATIENT
Start: 2021-07-09 | End: 2021-07-09

## 2021-07-09 RX ORDER — LABETALOL HCL 100 MG
100 TABLET ORAL
Refills: 0 | Status: DISCONTINUED | OUTPATIENT
Start: 2021-07-09 | End: 2021-07-12

## 2021-07-09 RX ORDER — POTASSIUM CHLORIDE 20 MEQ
20 PACKET (EA) ORAL
Refills: 0 | Status: COMPLETED | OUTPATIENT
Start: 2021-07-09 | End: 2021-07-09

## 2021-07-09 RX ADMIN — Medication 15 MILLIGRAM(S): at 05:13

## 2021-07-09 RX ADMIN — Medication 1 PATCH: at 15:00

## 2021-07-09 RX ADMIN — AMLODIPINE BESYLATE 10 MILLIGRAM(S): 2.5 TABLET ORAL at 21:07

## 2021-07-09 RX ADMIN — Medication 81 MILLIGRAM(S): at 11:24

## 2021-07-09 RX ADMIN — LOSARTAN POTASSIUM 100 MILLIGRAM(S): 100 TABLET, FILM COATED ORAL at 05:13

## 2021-07-09 RX ADMIN — CHLORHEXIDINE GLUCONATE 1 APPLICATION(S): 213 SOLUTION TOPICAL at 17:02

## 2021-07-09 RX ADMIN — Medication 1 PATCH: at 07:00

## 2021-07-09 RX ADMIN — SODIUM CHLORIDE 250 MILLILITER(S): 9 INJECTION, SOLUTION INTRAVENOUS at 07:49

## 2021-07-09 RX ADMIN — Medication 50 MILLIEQUIVALENT(S): at 01:03

## 2021-07-09 RX ADMIN — CHLORHEXIDINE GLUCONATE 1 APPLICATION(S): 213 SOLUTION TOPICAL at 05:14

## 2021-07-09 RX ADMIN — Medication 20 MILLIEQUIVALENT(S): at 12:34

## 2021-07-09 RX ADMIN — SODIUM CHLORIDE 150 MILLILITER(S): 9 INJECTION, SOLUTION INTRAVENOUS at 01:02

## 2021-07-09 RX ADMIN — DIVALPROEX SODIUM 500 MILLIGRAM(S): 500 TABLET, DELAYED RELEASE ORAL at 17:02

## 2021-07-09 RX ADMIN — Medication 1 PATCH: at 12:34

## 2021-07-09 RX ADMIN — HALOPERIDOL DECANOATE 5 MILLIGRAM(S): 100 INJECTION INTRAMUSCULAR at 21:07

## 2021-07-09 RX ADMIN — Medication 1 PATCH: at 19:20

## 2021-07-09 RX ADMIN — NYSTATIN CREAM 1 APPLICATION(S): 100000 CREAM TOPICAL at 17:02

## 2021-07-09 RX ADMIN — Medication 100 MILLIGRAM(S): at 16:16

## 2021-07-09 RX ADMIN — ENOXAPARIN SODIUM 40 MILLIGRAM(S): 100 INJECTION SUBCUTANEOUS at 11:25

## 2021-07-09 RX ADMIN — DIVALPROEX SODIUM 500 MILLIGRAM(S): 500 TABLET, DELAYED RELEASE ORAL at 05:13

## 2021-07-09 RX ADMIN — NYSTATIN CREAM 1 APPLICATION(S): 100000 CREAM TOPICAL at 05:15

## 2021-07-09 RX ADMIN — SODIUM CHLORIDE 100 MILLILITER(S): 9 INJECTION, SOLUTION INTRAVENOUS at 09:00

## 2021-07-09 RX ADMIN — Medication 20 MILLIEQUIVALENT(S): at 10:29

## 2021-07-09 RX ADMIN — Medication 15 MILLIGRAM(S): at 17:02

## 2021-07-09 NOTE — PROGRESS NOTE ADULT - SUBJECTIVE AND OBJECTIVE BOX
Patient is a 60y old  Male who presents with a chief complaint of severe hyponatremia, (2021 18:56)        Over Night Events:  no acute events overnight      ROS:     All ROS are negative except HPI         PHYSICAL EXAM    ICU Vital Signs Last 24 Hrs  T(C): 36.6 (2021 03:00), Max: 37 (2021 15:00)  T(F): 97.8 (2021 03:00), Max: 98.6 (2021 15:00)  HR: 81 (2021 07:23) (67 - 85)  BP: 132/85 (2021 07:23) (118/53 - 180/85)  BP(mean): 103 (2021 07:23) (77 - 126)  ABP: --  ABP(mean): --  RR: 24 (2021 07:23) (16 - 103)  SpO2: 97% (2021 07:23) (94% - 99%)      CONSTITUTIONAL:  chronically ill appearing in  NAD    ENT:   Airway patent,   Mouth with normal mucosa.   No thrush    EYES:   Pupils equal,   Round and reactive to light.    CARDIAC:   Normal rate,   Regular rhythm.    No edema      Vascular:  Normal systolic impulse  No Carotid bruits    RESPIRATORY:   No wheezing  Bilateral BS  Normal chest expansion  Not tachypneic,  No use of accessory muscles    GASTROINTESTINAL:  Abdomen soft,   Non-tender,   No guarding,   + BS    MUSCULOSKELETAL:   Range of motion is not limited,  No clubbing, cyanosis    NEUROLOGICAL:   Alert and oriented   No motor  deficits.    SKIN:   Skin normal color for race,   Warm and dry and intact.   No evidence of rash.    PSYCHIATRIC:   suicidal ideations    HEMATOLOGICAL:  No cervical  lymphadenopathy.  no inguinal lymphadenopathy      21 @ 07:01  -  21 @ 07:00  --------------------------------------------------------  IN:    dextrose 5%: 250 mL    dextrose 5%: 1050 mL    dextrose 5%: 1800 mL    IV PiggyBack: 200 mL    Oral Fluid: 580 mL    sodium chloride 0.9%: 225 mL  Total IN: 4105 mL    OUT:    Indwelling Catheter - Urethral (mL): 2010 mL    Voided (mL): 1150 mL  Total OUT: 3160 mL    Total NET: 945 mL      21 @ 07:01  -  21 @ 09:17  --------------------------------------------------------  IN:    dextrose 5%: 250 mL  Total IN: 250 mL    OUT:  Total OUT: 0 mL    Total NET: 250 mL          LABS:                            12.3   7.90  )-----------( 177      ( 2021 05:14 )             34.1                                                   118<LL>  |  83<L>  |  7<L>  ----------------------------<  103<H>  3.4<L>   |  25  |  0.5<L>    Ca    9.0      2021 05:14  Mg     1.9         TPro  6.0  /  Alb  3.7  /  TBili  0.3  /  DBili  x   /  AST  10  /  ALT  <5  /  AlkPhos  62                                               Urinalysis Basic - ( 2021 08:00 )    Color: Yellow / Appearance: Clear / S.015 / pH: x  Gluc: x / Ketone: Negative  / Bili: Negative / Urobili: 0.2 mg/dL   Blood: x / Protein: Negative mg/dL / Nitrite: Negative   Leuk Esterase: Negative / RBC: x / WBC x   Sq Epi: x / Non Sq Epi: x / Bacteria: x        CARDIAC MARKERS ( 2021 11:16 )  x     / 0.02 ng/mL / x     / x     / x      CARDIAC MARKERS ( 2021 04:00 )  x     / 0.03 ng/mL / x     / x     / x                                                LIVER FUNCTIONS - ( 2021 05:14 )  Alb: 3.7 g/dL / Pro: 6.0 g/dL / ALK PHOS: 62 U/L / ALT: <5 U/L / AST: 10 U/L / GGT: x                                                                                                                                       MEDICATIONS  (STANDING):  amLODIPine   Tablet 10 milliGRAM(s) Oral at bedtime  aspirin enteric coated 81 milliGRAM(s) Oral daily  busPIRone 15 milliGRAM(s) Oral two times a day  chlorhexidine 4% Liquid 1 Application(s) Topical every 12 hours  dextrose 5%. 1000 milliLiter(s) (100 mL/Hr) IV Continuous <Continuous>  diVALproex  milliGRAM(s) Oral two times a day  enoxaparin Injectable 40 milliGRAM(s) SubCutaneous daily  haloperidol     Tablet 5 milliGRAM(s) Oral at bedtime  losartan 100 milliGRAM(s) Oral daily  nicotine -  14 mG/24Hr(s) Patch 1 patch Transdermal daily  nystatin Powder 1 Application(s) Topical two times a day    MEDICATIONS  (PRN):      New X-rays reviewed:                                                                                  ECHO    CXR interpreted by me:

## 2021-07-09 NOTE — PROGRESS NOTE ADULT - ASSESSMENT
59 yo M with PMH of schizophrenia, HLD, depression, HTN admitted for suicidal ideation, found to have hyponatremia, Na+ 111, secondary to polydipsia    # Hyponatremia secondary to polydipsia  - patient was initially on fluid restriction, however na+ went 111 > 122, to avoid cerebral edema was started on D5W, DDAVP  - currently Na+ 118, 24 hour correction of sodium 6-8, D5W reduced from 250 > 100,   - monitor BMP around the clock    # suicidal ideation  - cleared by psych, patient has chronic suicidal ideation    # HTN - Amlodipine 10mg, losartan 100mg, added labetalol 100mg qd, decreased fluids, eventually once fluid discontinued, will taper down blood pressure med    # schizophrenia/depression/agitation  - busPIRone 15 milliGRAM(s) Oral two times a day  - diVALproex  milliGRAM(s) Oral two times a day  - haloperidol Tablet 5 milliGRAM(s) Oral at bedtime    #) MISC  Activity: IAT  DVT ppx: lovenox   GI ppx: protonix  Diet: regular  Code: Full  Dispo: medical optimization  CHG wash

## 2021-07-09 NOTE — PROGRESS NOTE ADULT - ASSESSMENT
61 yo M with PMH of schizophrenia, HLD, depression, HTN presenting for suicidal ideation. Patient states that he wants to cut his wrists, pt found with hyponatremia    hyponatremia possibly secondary to psychogenic polydipsia / suicidal ideation     - fluid restrict and follow repeat chemistries    - nephrology f/u   - cleared by psych   - continue home meds   - DVT prophylaxis

## 2021-07-09 NOTE — PROGRESS NOTE ADULT - ASSESSMENT
# Euvolemic hyponatremia, hypo-osmolar  - low urine Na/urine osm c/w psychogenic polydipsia, poor solute intake  # HTN    Recommendations:  - Na level improved appropriately   - goal Na level 128 on 7/10 am  - decrease rate of d5w to 75cc/hr   - check Na level qshift   - cont strict i/o  - TSH noted wnl   - encourage solute intake, protein  - replete Mg to 2, K to 4 as needed  - cont amlodipine, losartan   - increase labetalol to tid dosing for better bp control

## 2021-07-09 NOTE — PROGRESS NOTE ADULT - SUBJECTIVE AND OBJECTIVE BOX
SAM REA 60y Male  MRN#: [id number]   CODE STATUS:________      SUBJECTIVE  Patient is a 60y old Male who presents with a chief complaint of severe hyponatremia, (2021 09:16)  Currently admitted to medicine with the primary diagnosis of Hyponatremia    Hospital course has been complicated by _______.   Today is hospital day 1d, and this morning he is _________ and reports ________ overnight events.     Present Today:           Oliver Catheter ()No/ ()Yes? Indication:          Central Line ()No/ ()Yes? Indication:          IV Fluids ()No/ ()Yes? Type:  Rate:  Indication:      OBJECTIVE  PAST MEDICAL & SURGICAL HISTORY  HTN (hypertension)    Depression    Hyperlipemia    Schizophrenia    No significant past surgical history      ALLERGIES:  tetanus toxoid (Swelling)    MEDICATIONS:  STANDING MEDICATIONS  amLODIPine   Tablet 10 milliGRAM(s) Oral at bedtime  aspirin enteric coated 81 milliGRAM(s) Oral daily  busPIRone 15 milliGRAM(s) Oral two times a day  chlorhexidine 4% Liquid 1 Application(s) Topical every 12 hours  dextrose 5%. 1000 milliLiter(s) IV Continuous <Continuous>  diVALproex  milliGRAM(s) Oral two times a day  enoxaparin Injectable 40 milliGRAM(s) SubCutaneous daily  haloperidol     Tablet 5 milliGRAM(s) Oral at bedtime  labetalol 100 milliGRAM(s) Oral two times a day  losartan 100 milliGRAM(s) Oral daily  nicotine -  14 mG/24Hr(s) Patch 1 patch Transdermal daily  nystatin Powder 1 Application(s) Topical two times a day    PRN MEDICATIONS      VITAL SIGNS: Last 24 Hours  T(C): 36.6 (2021 11:00), Max: 37 (2021 15:00)  T(F): 97.8 (2021 11:00), Max: 98.6 (2021 15:00)  HR: 103 (2021 11:00) (67 - 103)  BP: 175/87 (2021 11:00) (118/53 - 180/85)  BP(mean): 123 (2021 11:00) (77 - 124)  RR: 21 (2021 11:00) (11 - 103)  SpO2: 97% (2021 11:00) (94% - 99%)    PHYSICAL EXAM:  GENERAL: NAD, well-developed, AAOx3, depressed  HEENT:  Atraumatic, Normocephalic.   PULMONARY: Clear to auscultation bilaterally; No wheeze  CARDIOVASCULAR: Regular rate and rhythm; No murmurs, rubs, or gallops  GASTROINTESTINAL: Soft, Nontender, Nondistended; Bowel sounds present  MUSCULOSKELETAL:  2+ Peripheral Pulses, No clubbing, cyanosis, or edema    LABS:                        12.3   7.90  )-----------( 177      ( 2021 05:14 )             34.1     07-    118<LL>  |  81<L>  |  6<L>  ----------------------------<  124<H>  3.7   |  27  |  0.6<L>    Ca    9.6      2021 11:14  Mg     1.9         TPro  6.0  /  Alb  3.7  /  TBili  0.3  /  DBili  x   /  AST  10  /  ALT  <5  /  AlkPhos  62  07-09      Urinalysis Basic - ( 2021 08:00 )    Color: Yellow / Appearance: Clear / S.015 / pH: x  Gluc: x / Ketone: Negative  / Bili: Negative / Urobili: 0.2 mg/dL   Blood: x / Protein: Negative mg/dL / Nitrite: Negative   Leuk Esterase: Negative / RBC: x / WBC x   Sq Epi: x / Non Sq Epi: x / Bacteria: x            CARDIAC MARKERS ( 2021 11:16 )  x     / 0.02 ng/mL / x     / x     / x      CARDIAC MARKERS ( 2021 04:00 )  x     / 0.03 ng/mL / x     / x     / x          RADIOLOGY:              SAM REA 60y Male  MRN#: [id number]   CODE STATUS: full      SUBJECTIVE  Patient is a 60y old Male who presents with a chief complaint of severe hyponatremia, (2021 09:16)  Currently admitted to medicine with the primary diagnosis of Hyponatremia    no overnight events    OBJECTIVE  PAST MEDICAL & SURGICAL HISTORY  HTN (hypertension)    Depression    Hyperlipemia    Schizophrenia    No significant past surgical history      ALLERGIES:  tetanus toxoid (Swelling)    MEDICATIONS:  STANDING MEDICATIONS  amLODIPine   Tablet 10 milliGRAM(s) Oral at bedtime  aspirin enteric coated 81 milliGRAM(s) Oral daily  busPIRone 15 milliGRAM(s) Oral two times a day  chlorhexidine 4% Liquid 1 Application(s) Topical every 12 hours  dextrose 5%. 1000 milliLiter(s) IV Continuous <Continuous>  diVALproex  milliGRAM(s) Oral two times a day  enoxaparin Injectable 40 milliGRAM(s) SubCutaneous daily  haloperidol     Tablet 5 milliGRAM(s) Oral at bedtime  labetalol 100 milliGRAM(s) Oral two times a day  losartan 100 milliGRAM(s) Oral daily  nicotine -  14 mG/24Hr(s) Patch 1 patch Transdermal daily  nystatin Powder 1 Application(s) Topical two times a day    PRN MEDICATIONS      VITAL SIGNS: Last 24 Hours  T(C): 36.6 (2021 11:00), Max: 37 (2021 15:00)  T(F): 97.8 (2021 11:00), Max: 98.6 (2021 15:00)  HR: 103 (2021 11:00) (67 - 103)  BP: 175/87 (2021 11:00) (118/53 - 180/85)  BP(mean): 123 (2021 11:00) (77 - 124)  RR: 21 (2021 11:00) (11 - 103)  SpO2: 97% (2021 11:00) (94% - 99%)    PHYSICAL EXAM:  GENERAL: NAD, well-developed, AAOx3, depressed  HEENT:  Atraumatic, Normocephalic.   PULMONARY: Clear to auscultation bilaterally; No wheeze  CARDIOVASCULAR: Regular rate and rhythm; No murmurs, rubs, or gallops  GASTROINTESTINAL: Soft, Nontender, Nondistended; Bowel sounds present  MUSCULOSKELETAL:  2+ Peripheral Pulses, No clubbing, cyanosis, or edema    LABS:                        12.3   7.90  )-----------( 177      ( 2021 05:14 )             34.1         118<LL>  |  81<L>  |  6<L>  ----------------------------<  124<H>  3.7   |  27  |  0.6<L>    Ca    9.6      2021 11:14  Mg     1.9         TPro  6.0  /  Alb  3.7  /  TBili  0.3  /  DBili  x   /  AST  10  /  ALT  <5  /  AlkPhos  62        Urinalysis Basic - ( 2021 08:00 )    Color: Yellow / Appearance: Clear / S.015 / pH: x  Gluc: x / Ketone: Negative  / Bili: Negative / Urobili: 0.2 mg/dL   Blood: x / Protein: Negative mg/dL / Nitrite: Negative   Leuk Esterase: Negative / RBC: x / WBC x   Sq Epi: x / Non Sq Epi: x / Bacteria: x            CARDIAC MARKERS ( 2021 11:16 )  x     / 0.02 ng/mL / x     / x     / x      CARDIAC MARKERS ( 2021 04:00 )  x     / 0.03 ng/mL / x     / x     / x          RADIOLOGY:

## 2021-07-09 NOTE — PROGRESS NOTE ADULT - ASSESSMENT
IMPRESSION:  severe hyponatremia secondary to psychogenic polydipsia  suicidal ideations  hypokalemia  ho HTN  ho schizophrenia    PLAN:    CNS: avoid depressants. continue [facility]   recall psych  1:1     HEENT: Oral care    PULMONARY:  HOB @ 45 degrees.  Aspiration precautions     CARDIOVASCULAR: Goal map >65. start labetalol 100 q12h    GI: GI prophylaxis.  Feeding.  Bowel regimen     RENAL:  Follow up lytes.  Correct as needed.   serum osm, urine osm, urine sodium,   q round BMP and adjust accordingly.    INFECTIOUS DISEASE: Follow up cultures    HEMATOLOGICAL:  DVT prophylaxis.    ENDOCRINE:  Follow up FS.  Insulin protocol if needed.    MUSCULOSKELETAL: bedrest    Dispo: MICU             IMPRESSION:  severe hyponatremia secondary to psychogenic polydipsia  suicidal ideations  hypokalemia  HO HTN  HO schizophrenia    PLAN:    CNS: avoid depressants. continue VA. Check level  psych eval appreciated    HEENT: Oral care    PULMONARY:  HOB @ 45 degrees.  Aspiration precautions     CARDIOVASCULAR: Goal map >65. Start labetalol 100 q12h    GI: GI prophylaxis.  Feeding.  Bowel regimen     RENAL:  Follow up lytes.  Correct as needed.   QRound BMP and adjust fluids accordingly.    INFECTIOUS DISEASE: Follow up cultures    HEMATOLOGICAL:  DVT prophylaxis.    ENDOCRINE:  Follow up FS.  Insulin protocol if needed.    MUSCULOSKELETAL: bedrest    Dispo: MICU

## 2021-07-09 NOTE — PROGRESS NOTE ADULT - SUBJECTIVE AND OBJECTIVE BOX
Nephrology Progress Note    SAM REA  MRN-[id number]  60y  Male    S:  Patient is seen and examined, events over the last 24h noted.    O:  Allergies:  tetanus toxoid (Swelling)    Hospital Medications:   MEDICATIONS  (STANDING):  amLODIPine   Tablet 10 milliGRAM(s) Oral at bedtime  aspirin enteric coated 81 milliGRAM(s) Oral daily  busPIRone 15 milliGRAM(s) Oral two times a day  chlorhexidine 4% Liquid 1 Application(s) Topical every 12 hours  diVALproex  milliGRAM(s) Oral two times a day  enoxaparin Injectable 40 milliGRAM(s) SubCutaneous daily  haloperidol     Tablet 5 milliGRAM(s) Oral at bedtime  labetalol 100 milliGRAM(s) Oral two times a day  losartan 100 milliGRAM(s) Oral daily  nicotine -  14 mG/24Hr(s) Patch 1 patch Transdermal daily  nystatin Powder 1 Application(s) Topical two times a day      VITALS:  T(F): 97.8 (21 @ 11:00), Max: 98.2 (21 @ 21:02)  HR: 102 (21 @ 15:51)  BP: 162/105 (21 @ 15:51)  RR: 24 (21 @ 15:51)  SpO2: 97% (21 @ 15:51)  Wt(kg): --  I&O's Detail    2021 07:01  -  2021 07:00  --------------------------------------------------------  IN:    dextrose 5%: 250 mL    dextrose 5%: 1050 mL    dextrose 5%: 1800 mL    IV PiggyBack: 200 mL    Oral Fluid: 580 mL    sodium chloride 0.9%: 225 mL  Total IN: 4105 mL    OUT:    Indwelling Catheter - Urethral (mL): 2010 mL    Voided (mL): 1150 mL  Total OUT: 3160 mL    Total NET: 945 mL      2021 07:  -  2021 16:34  --------------------------------------------------------  IN:    dextrose 5%: 250 mL    dextrose 5%: 500 mL    Oral Fluid: 940 mL  Total IN: 1690 mL    OUT:    Indwelling Catheter - Urethral (mL): 2225 mL  Total OUT: 2225 mL    Total NET: -535 mL        I&O's Summary    2021 07:  -  2021 07:00  --------------------------------------------------------  IN: 4105 mL / OUT: 3160 mL / NET: 945 mL    2021 07:01  -  2021 16:34  --------------------------------------------------------  IN: 1690 mL / OUT: 2225 mL / NET: -535 mL          PHYSICAL EXAM:  Gen: NAD  Resp: CTAB  Card: S1/S2  Abd: soft  Extremities: no edema      LABS:          118<LL>  |  81<L>  |  6<L>  ----------------------------<  124<H>  3.7   |  27  |  0.6<L>    Ca    9.6      2021 11:14  Mg     1.9         TPro  6.0  /  Alb  3.7  /  TBili  0.3  /  DBili      /  AST  10  /  ALT  <5  /  AlkPhos  62      eGFR if Non African American: 110 mL/min/1.73M2 (21 @ 11:14)  eGFR if African American: 128 mL/min/1.73M2 (21 @ 11:14)      Osmolality, Serum: 238 mos/kg (21 @ 03:40)                          12.3   7.90  )-----------( 177      ( 2021 05:14 )             34.1     Mean Cell Volume: 78.6 fL (21 @ 05:14)        Urine Studies:  Urinalysis Basic - ( 2021 08:00 )    Color: Yellow / Appearance: Clear / S.015 / pH:   Gluc:  / Ketone: Negative  / Bili: Negative / Urobili: 0.2 mg/dL   Blood:  / Protein: Negative mg/dL / Nitrite: Negative   Leuk Esterase: Negative / RBC:  / WBC    Sq Epi:  / Non Sq Epi:  / Bacteria:           Sodium, Random Urine: <20.0 mmoL/L ( @ 08:00)  Osmolality, Random Urine: 54 mos/kg ( @ 08:00)    Creatinine trend:  Creatinine, Serum: 0.6 mg/dL (21 @ 11:14)  Creatinine, Serum: 0.5 mg/dL (21 @ 05:14)  Creatinine, Serum: 0.7 mg/dL (21 @ 21:34)  Creatinine, Serum: 0.6 mg/dL (21 @ 19:10)    Sodium, Serum: 118 mmol/L (21 @ 11:14)  Sodium, Serum: 118 mmol/L (21 @ 05:14)  Sodium, Serum: 123 mmol/L (21 @ 21:34)  Sodium, Serum: 122 mmol/L (21 @ 19:10)  Sodium, Serum: 123 mmol/L (21 @ 15:18)  Sodium, Serum: 124 mmol/L (21 @ 11:16)  Sodium, Serum: 122 mmol/L (21 @ 09:30)  Sodium, Serum: 111 mmol/L (21 @ 02:55)  Sodium, Serum: 131 mmol/L (19 @ 20:51)

## 2021-07-09 NOTE — PROGRESS NOTE ADULT - ATTENDING COMMENTS
Attending Statement: I have personally performed a face to face diagnostic evaluation on this patient. The patient is suffering from severe hyponatremia secondary to psychogenic polydipsia.  I have reviewed the above note and agree with the history, exam and suggestions for care, except as I have noted in the text. I have amended the treatment plans as necessary.

## 2021-07-09 NOTE — PROGRESS NOTE ADULT - SUBJECTIVE AND OBJECTIVE BOX
Patient is comfortable in bed      T(F): 97.8 (07-09-21 @ 11:00), Max: 98.6 (07-08-21 @ 15:00)  HR: 103 (07-09-21 @ 11:00)  BP: 175/87 (07-09-21 @ 11:00)  RR: 18  SpO2: 97% (07-09-21 @ 11:00) (94% - 99%)    PHYSICAL EXAM:  GENERAL: NAD  HEAD:  Atraumatic, Normocephalic  EYES: EOMI, PERRLA, conjunctiva and sclera clear  NERVOUS SYSTEM:  no focal deficits   CHEST/LUNG: Clear to percussion bilaterally; No rales, rhonchi, wheezing, or rubs  HEART: Regular rate and rhythm; No murmurs, rubs, or gallops  ABDOMEN: Soft, Nontender, Nondistended; Bowel sounds present  EXTREMITIES:  2+ Peripheral Pulses, No clubbing, cyanosis, or edema    LABS  07-09    118<LL>  |  81<L>  |  6<L>  ----------------------------<  124<H>  3.7   |  27  |  0.6<L>    Ca    9.6      09 Jul 2021 11:14  Mg     1.9     07-09    TPro  6.0  /  Alb  3.7  /  TBili  0.3  /  DBili  x   /  AST  10  /  ALT  <5  /  AlkPhos  62  07-09                          12.3   7.90  )-----------( 177      ( 09 Jul 2021 05:14 )             34.1         CARDIAC ENZYMES    Troponin T, Serum: 0.02 ng/mL (07-08-21 @ 11:16)  Troponin T, Serum: 0.03 ng/mL (07-08-21 @ 04:00)    < from: 12 Lead ECG (07.08.21 @ 05:34) >  Diagnosis Line Sinus rhythm with 1st degree A-V block    < end of copied text >      RADIOLOGY  < from: CT Head No Cont (07.08.21 @ 10:52) >    IMPRESSION:    No acute intracranial pathology.    < end of copied text >    MEDICATIONS  (STANDING):  amLODIPine   Tablet 10 milliGRAM(s) Oral at bedtime  aspirin enteric coated 81 milliGRAM(s) Oral daily  busPIRone 15 milliGRAM(s) Oral two times a day  chlorhexidine 4% Liquid 1 Application(s) Topical every 12 hours  diVALproex  milliGRAM(s) Oral two times a day  enoxaparin Injectable 40 milliGRAM(s) SubCutaneous daily  haloperidol     Tablet 5 milliGRAM(s) Oral at bedtime  labetalol 100 milliGRAM(s) Oral two times a day  losartan 100 milliGRAM(s) Oral daily  nicotine -  14 mG/24Hr(s) Patch 1 patch Transdermal daily  nystatin Powder 1 Application(s) Topical two times a day    MEDICATIONS  (PRN):

## 2021-07-10 LAB
ALBUMIN SERPL ELPH-MCNC: 4 G/DL — SIGNIFICANT CHANGE UP (ref 3.5–5.2)
ALP SERPL-CCNC: 70 U/L — SIGNIFICANT CHANGE UP (ref 30–115)
ALT FLD-CCNC: 5 U/L — SIGNIFICANT CHANGE UP (ref 0–41)
ANION GAP SERPL CALC-SCNC: 11 MMOL/L — SIGNIFICANT CHANGE UP (ref 7–14)
AST SERPL-CCNC: 11 U/L — SIGNIFICANT CHANGE UP (ref 0–41)
BASOPHILS # BLD AUTO: 0.05 K/UL — SIGNIFICANT CHANGE UP (ref 0–0.2)
BASOPHILS NFR BLD AUTO: 0.6 % — SIGNIFICANT CHANGE UP (ref 0–1)
BILIRUB SERPL-MCNC: 0.3 MG/DL — SIGNIFICANT CHANGE UP (ref 0.2–1.2)
BUN SERPL-MCNC: 7 MG/DL — LOW (ref 10–20)
CALCIUM SERPL-MCNC: 10.1 MG/DL — SIGNIFICANT CHANGE UP (ref 8.5–10.1)
CHLORIDE SERPL-SCNC: 91 MMOL/L — LOW (ref 98–110)
CO2 SERPL-SCNC: 24 MMOL/L — SIGNIFICANT CHANGE UP (ref 17–32)
CREAT SERPL-MCNC: 0.6 MG/DL — LOW (ref 0.7–1.5)
EOSINOPHIL # BLD AUTO: 0.18 K/UL — SIGNIFICANT CHANGE UP (ref 0–0.7)
EOSINOPHIL NFR BLD AUTO: 2.1 % — SIGNIFICANT CHANGE UP (ref 0–8)
GLUCOSE SERPL-MCNC: 94 MG/DL — SIGNIFICANT CHANGE UP (ref 70–99)
HCT VFR BLD CALC: 39 % — LOW (ref 42–52)
HGB BLD-MCNC: 13.7 G/DL — LOW (ref 14–18)
IMM GRANULOCYTES NFR BLD AUTO: 0.6 % — HIGH (ref 0.1–0.3)
LYMPHOCYTES # BLD AUTO: 2.17 K/UL — SIGNIFICANT CHANGE UP (ref 1.2–3.4)
LYMPHOCYTES # BLD AUTO: 25.9 % — SIGNIFICANT CHANGE UP (ref 20.5–51.1)
MCHC RBC-ENTMCNC: 28.1 PG — SIGNIFICANT CHANGE UP (ref 27–31)
MCHC RBC-ENTMCNC: 35.1 G/DL — SIGNIFICANT CHANGE UP (ref 32–37)
MCV RBC AUTO: 80.1 FL — SIGNIFICANT CHANGE UP (ref 80–94)
MONOCYTES # BLD AUTO: 0.89 K/UL — HIGH (ref 0.1–0.6)
MONOCYTES NFR BLD AUTO: 10.6 % — HIGH (ref 1.7–9.3)
NEUTROPHILS # BLD AUTO: 5.04 K/UL — SIGNIFICANT CHANGE UP (ref 1.4–6.5)
NEUTROPHILS NFR BLD AUTO: 60.2 % — SIGNIFICANT CHANGE UP (ref 42.2–75.2)
NRBC # BLD: 0 /100 WBCS — SIGNIFICANT CHANGE UP (ref 0–0)
PLATELET # BLD AUTO: 231 K/UL — SIGNIFICANT CHANGE UP (ref 130–400)
POTASSIUM SERPL-MCNC: 4.3 MMOL/L — SIGNIFICANT CHANGE UP (ref 3.5–5)
POTASSIUM SERPL-SCNC: 4.3 MMOL/L — SIGNIFICANT CHANGE UP (ref 3.5–5)
PROT SERPL-MCNC: 6.5 G/DL — SIGNIFICANT CHANGE UP (ref 6–8)
RBC # BLD: 4.87 M/UL — SIGNIFICANT CHANGE UP (ref 4.7–6.1)
RBC # FLD: 14.3 % — SIGNIFICANT CHANGE UP (ref 11.5–14.5)
SODIUM SERPL-SCNC: 126 MMOL/L — LOW (ref 135–146)
WBC # BLD: 8.38 K/UL — SIGNIFICANT CHANGE UP (ref 4.8–10.8)
WBC # FLD AUTO: 8.38 K/UL — SIGNIFICANT CHANGE UP (ref 4.8–10.8)

## 2021-07-10 PROCEDURE — 99232 SBSQ HOSP IP/OBS MODERATE 35: CPT

## 2021-07-10 PROCEDURE — 99233 SBSQ HOSP IP/OBS HIGH 50: CPT

## 2021-07-10 PROCEDURE — 93010 ELECTROCARDIOGRAM REPORT: CPT

## 2021-07-10 RX ADMIN — AMLODIPINE BESYLATE 10 MILLIGRAM(S): 2.5 TABLET ORAL at 21:14

## 2021-07-10 RX ADMIN — Medication 15 MILLIGRAM(S): at 05:08

## 2021-07-10 RX ADMIN — Medication 1 PATCH: at 11:15

## 2021-07-10 RX ADMIN — NYSTATIN CREAM 1 APPLICATION(S): 100000 CREAM TOPICAL at 05:09

## 2021-07-10 RX ADMIN — Medication 100 MILLIGRAM(S): at 05:08

## 2021-07-10 RX ADMIN — LOSARTAN POTASSIUM 100 MILLIGRAM(S): 100 TABLET, FILM COATED ORAL at 05:08

## 2021-07-10 RX ADMIN — DIVALPROEX SODIUM 500 MILLIGRAM(S): 500 TABLET, DELAYED RELEASE ORAL at 05:07

## 2021-07-10 RX ADMIN — ENOXAPARIN SODIUM 40 MILLIGRAM(S): 100 INJECTION SUBCUTANEOUS at 11:21

## 2021-07-10 RX ADMIN — Medication 15 MILLIGRAM(S): at 17:17

## 2021-07-10 RX ADMIN — HALOPERIDOL DECANOATE 5 MILLIGRAM(S): 100 INJECTION INTRAMUSCULAR at 21:14

## 2021-07-10 RX ADMIN — NYSTATIN CREAM 1 APPLICATION(S): 100000 CREAM TOPICAL at 17:17

## 2021-07-10 RX ADMIN — Medication 1 PATCH: at 07:59

## 2021-07-10 RX ADMIN — DIVALPROEX SODIUM 500 MILLIGRAM(S): 500 TABLET, DELAYED RELEASE ORAL at 17:17

## 2021-07-10 RX ADMIN — Medication 1 PATCH: at 11:21

## 2021-07-10 RX ADMIN — Medication 81 MILLIGRAM(S): at 11:21

## 2021-07-10 RX ADMIN — Medication 100 MILLIGRAM(S): at 17:17

## 2021-07-10 RX ADMIN — Medication 1 PATCH: at 17:26

## 2021-07-10 RX ADMIN — CHLORHEXIDINE GLUCONATE 1 APPLICATION(S): 213 SOLUTION TOPICAL at 05:08

## 2021-07-10 NOTE — PROGRESS NOTE ADULT - SUBJECTIVE AND OBJECTIVE BOX
[facility] FOLLOW UP NOTE  --------------------------------------------------------------------------------  Chief Complaint:    24 hour events/subjective:  Evenys over last 24hrs noted.  Still w/ poor appetite        PAST HISTORY  --------------------------------------------------------------------------------  No significant changes to PMH, PSH, FHx, SHx, unless otherwise noted    ALLERGIES & MEDICATIONS  --------------------------------------------------------------------------------  Allergies    tetanus toxoid (Swelling)    Intolerances      Standing Inpatient Medications  amLODIPine   Tablet 10 milliGRAM(s) Oral at bedtime  aspirin enteric coated 81 milliGRAM(s) Oral daily  busPIRone 15 milliGRAM(s) Oral two times a day  chlorhexidine 4% Liquid 1 Application(s) Topical every 12 hours  diVALproex  milliGRAM(s) Oral two times a day  enoxaparin Injectable 40 milliGRAM(s) SubCutaneous daily  haloperidol     Tablet 5 milliGRAM(s) Oral at bedtime  labetalol 100 milliGRAM(s) Oral two times a day  losartan 100 milliGRAM(s) Oral daily  nicotine -  14 mG/24Hr(s) Patch 1 patch Transdermal daily  nystatin Powder 1 Application(s) Topical two times a day    PRN Inpatient Medications      REVIEW OF SYSTEMS  --------------------------------------------------------------------------------  Gen: No weight changes, fatigue, fevers/chills, weakness  Skin: No rashes  Head/Eyes/Ears/Mouth: No headache; Normal hearing; Normal vision w/o blurriness; No sinus pain/discomfort, sore throat  Respiratory: No dyspnea, cough, wheezing, hemoptysis  CV: No chest pain, PND, orthopnea  GI: No abdominal pain, diarrhea, constipation, nausea, vomiting, melena, hematochezia  : No increased frequency, dysuria, hematuria, nocturia  MSK: No joint pain/swelling; no back pain; no edema  Neuro: No dizziness/lightheadedness, weakness, seizures, numbness, tingling  Heme: No easy bruising or bleeding  Endo: No heat/cold intolerance  Psych: No significant nervousness, anxiety, stress, depression    All other systems were reviewed and are negative, except as noted.    VITALS/PHYSICAL EXAM  --------------------------------------------------------------------------------  T(C): 36.3 (07-10-21 @ 16:56), Max: 36.9 (07-10-21 @ 03:00)  HR: 97 (07-10-21 @ 16:56) (82 - 97)  BP: 127/85 (07-10-21 @ 16:56) (105/62 - 157/88)  RR: 16 (07-10-21 @ 16:56) (11 - 25)  SpO2: 96% (07-10-21 @ 15:09) (95% - 98%)  Wt(kg): --  Height (cm): 172.7 (07-10-21 @ 16:56)  Weight (kg): 113.398 (07-10-21 @ 16:56)  BMI (kg/m2): 38 (07-10-21 @ 16:56)  BSA (m2): 2.25 (07-10-21 @ 16:56)      07-09-21 @ 07:01  -  07-10-21 @ 07:00  --------------------------------------------------------  IN: 2170 mL / OUT: 4425 mL / NET: -2255 mL    07-10-21 @ 07:01  -  07-10-21 @ 18:57  --------------------------------------------------------  IN: 780 mL / OUT: 660 mL / NET: 120 mL      Physical Exam:  	Gen: NAD, well-appearing  	Pulm: CTA B/L  	CV: RRR, S1S2; no rub, HSM apex and mid-systolic murmur right 2nd IS  	Back: No spinal or CVA tenderness; no sacral edema  	Abd: +BS, soft, nontender/nondistended  	: No suprapubic tenderness  	UE: Warm, FROM, no edema  	LE: Warm, no edema  	Neuro: No focal deficits  	Psych: Flat affect and mood  	Skin: Warm, without rashes  	Vascular access:    LABS/STUDIES  --------------------------------------------------------------------------------              13.7   8.38  >-----------<  231      [07-10-21 @ 05:53]              39.0     126  |  91  |  7   ----------------------------<  94      [07-10-21 @ 05:53]  4.3   |  24  |  0.6        Ca     10.1     [07-10-21 @ 05:53]      Mg     1.9     [07-09-21 @ 05:14]    TPro  6.5  /  Alb  4.0  /  TBili  0.3  /  DBili  x   /  AST  11  /  ALT  5   /  AlkPhos  70  [07-10-21 @ 05:53]          Creatinine Trend:  SCr 0.6 [07-10 @ 05:53]  SCr 0.6 [07-09 @ 21:41]  SCr 0.7 [07-09 @ 19:01]  SCr 0.6 [07-09 @ 15:45]  SCr 0.6 [07-09 @ 11:14]    Urinalysis - [07-08-21 @ 08:00]      Color Yellow / Appearance Clear / SG 1.015 / pH 7.0      Gluc Negative / Ketone Negative  / Bili Negative / Urobili 0.2       Blood Negative / Protein Negative / Leuk Est Negative / Nitrite Negative      RBC  / WBC  / Hyaline  / Gran  / Sq Epi  / Non Sq Epi  / Bacteria     Urine Sodium <20.0      [07-08-21 @ 08:00]  Urine Osmolality 54      [07-08-21 @ 08:00]    HbA1c 5.0      [07-27-19 @ 07:50]  TSH 1.77      [07-08-21 @ 07:00]  Lipid: chol 184, , HDL 44, LDL --      [07-08-21 @ 07:00]

## 2021-07-10 NOTE — PROGRESS NOTE ADULT - SUBJECTIVE AND OBJECTIVE BOX
Patient is a 60y old  Male who presents with a chief complaint of severe hyponatremia, (2021 16:33)        HPI:  59 yo W male w/ PMH as noted below. Pt was brought in from assisted living facility ([facility] [place]) for suicidal ideation. Initial labs from ER shows severe hyponatremia (111), severe hypokalemia (2.8), hypomagnesemia (1.7).  Pt is awake alert oriented x 3 in no acute distress.  Pt is on SSRI plus other psyche meds, w/  may be cause for sever hyponatremia. Pending negative COVID test pt is for admission to ICU.  (2021 05:54)      Pt evaluated on rounds.  I reviewed the radiology tests and hospital record.    I reviewed my previous notes on this patient.    Interval Events: No overnight events.    REVIEW OF SYSTEMS:   see HPI      OBJECTIVE:  ICU Vital Signs Last 24 Hrs  T(C): 36.9 (10 Jul 2021 03:00), Max: 36.9 (10 Jul 2021 03:00)  T(F): 98.4 (10 Jul 2021 03:00), Max: 98.4 (10 Jul 2021 03:00)  HR: 88 (10 Jul 2021 05:09) (79 - 106)  BP: 132/74 (10 Jul 2021 05:09) (114/65 - 175/87)  BP(mean): 98 (10 Jul 2021 05:09) (84 - 128)  ABP: --  ABP(mean): --  RR: 16 (10 Jul 2021 05:09) (11 - 29)  SpO2: 98% (10 Jul 2021 05:09) (95% - 98%)         @ :  -   @ 07:00  --------------------------------------------------------  IN: 4105 mL / OUT: 3160 mL / NET: 945 mL     @ 07:01  -  07-10 @ 06:51  --------------------------------------------------------  IN: 2170 mL / OUT: 4425 mL / NET: -2255 mL      CAPILLARY BLOOD GLUCOSE            PHYSICAL EXAM:     · CONSTITUTIONAL:   not septic appearing,   well nourished,   NAD    · ENMT:   Airway patent,   Nasal mucosa clear.  Mouth with normal mucosa.   No thrush    · EYES:   Clear bilaterally,   pupils equal,   round and reactive to light.    · CARDIAC:   Normal rate,   regular rhythm.    Heart sounds S1, S2.   No murmurs, no rubs or gallops on auscultation  no edema        CAROTID:   normal systolic impulse  no bruits    · RESPIRATORY:   rales  normal chest expansion  no retractions or use of accessory muscles  palpation of chest is normal with no fremitus  percussion of chest demonstrates no hyperresonance or dullness    · GASTROINTESTINAL:  Abdomen soft,   non-tender,   + BS  liver/spleen not palpable    · MUSCULOSKELETAL:   no clubbing, cyanosis      · SKIN:   Skin normal color for race,   warm, dry   No evidence of rash.        · HEME LYMPH:   no splenomegaly.  No cervical  lymphadenopathy.  no inguinal lymphadenopathy    HOSPITAL MEDICATIONS:  MEDICATIONS  (STANDING):  amLODIPine   Tablet 10 milliGRAM(s) Oral at bedtime  aspirin enteric coated 81 milliGRAM(s) Oral daily  busPIRone 15 milliGRAM(s) Oral two times a day  chlorhexidine 4% Liquid 1 Application(s) Topical every 12 hours  diVALproex  milliGRAM(s) Oral two times a day  enoxaparin Injectable 40 milliGRAM(s) SubCutaneous daily  haloperidol     Tablet 5 milliGRAM(s) Oral at bedtime  labetalol 100 milliGRAM(s) Oral two times a day  losartan 100 milliGRAM(s) Oral daily  nicotine -  14 mG/24Hr(s) Patch 1 patch Transdermal daily  nystatin Powder 1 Application(s) Topical two times a day    MEDICATIONS  (PRN):    sodium chloride 0.9%.: Solution, 1000 milliLiter(s) infuse at 75 mL/Hr  Provider's Contact #: [phone]      LABS:                        12.3   7.90  )-----------( 177      ( 2021 05:14 )             34.1     07-09    124<L>  |  87<L>  |  6<L>  ----------------------------<  112<H>  3.9   |  28  |  0.6<L>    Ca    9.4      2021 21:41  Mg     1.9         TPro  6.0  /  Alb  3.7  /  TBili  0.3  /  DBili  x   /  AST  10  /  ALT  <5  /  AlkPhos  62        Urinalysis Basic - ( 2021 08:00 )    Color: Yellow / Appearance: Clear / S.015 / pH: x  Gluc: x / Ketone: Negative  / Bili: Negative / Urobili: 0.2 mg/dL   Blood: x / Protein: Negative mg/dL / Nitrite: Negative   Leuk Esterase: Negative / RBC: x / WBC x   Sq Epi: x / Non Sq Epi: x / Bacteria: x          CARDIAC MARKERS ( 2021 11:16 )  x     / 0.02 ng/mL / x     / x     / x          COVID-19 PCR: NotDetec (2021 05:10)              RADIOLOGY: Today I personally interpreted the latest CXR and other pertinent films.

## 2021-07-10 NOTE — PROGRESS NOTE ADULT - ASSESSMENT
61 yo M with PMH of schizophrenia, HLD, depression, HTN presenting for suicidal ideation. Patient states that he wants to cut his wrists, pt found with hyponatremia    #hyponatremia possibly secondary to psychogenic polydipsia  #HTN  #Depression/suicidal ideation   #Tobacco addiction - nicotine patch     -management as per critical care team   - fluid restrict and follow repeat chemistries q 12  - nephrology following  - cont strict i/o  - TSH noted wnl   - encourage solute intake, protein  - replete Mg to 2, K to 4 as needed  - cont amlodipine, losartan, labetalol  - cleared by psych  - continue home meds  - DVT prophylaxis

## 2021-07-10 NOTE — PROGRESS NOTE ADULT - SUBJECTIVE AND OBJECTIVE BOX
Patient seen and examined this morning  Lying comfortably in bed  Denies any specific complaints      Vital Signs Last 24 Hrs  T(C): 36.4 (10 Jul 2021 15:00), Max: 36.9 (10 Jul 2021 03:00)  T(F): 97.5 (10 Jul 2021 15:00), Max: 98.4 (10 Jul 2021 03:00)  HR: 95 (10 Jul 2021 15:09) (79 - 95)  BP: 145/85 (10 Jul 2021 15:09) (105/62 - 170/86)  BP(mean): 109 (10 Jul 2021 15:09) (80 - 116)  RR: 20 (10 Jul 2021 15:09) (11 - 25)  SpO2: 96% (10 Jul 2021 15:09) (95% - 98%) on ra    PHYSICAL EXAM:  GENERAL: NAD, well-groomed, well-developed  HEAD:  Atraumatic, Normocephalic  EYES: EOMI, PERRLA, conjunctiva and sclera clear  NERVOUS SYSTEM:  Awake and alert, no focal deficits   CHEST/LUNG: Clear to percussion bilaterally; No rales, rhonchi, wheezing, or rubs  HEART: Regular rate and rhythm; No murmurs, rubs, or gallops  ABDOMEN: Soft, Nontender, Nondistended; Bowel sounds present, obese  EXTREMITIES:  2+ Peripheral Pulses, No clubbing, cyanosis, or edema  SKIN: No rashes or lesions    LABS:                        13.7   8.38  )-----------( 231      ( 10 Jul 2021 05:53 )             39.0     07-10    126<L>  |  91<L>  |  7<L>  ----------------------------<  94  4.3   |  24  |  0.6<L>    Ca    10.1      10 Jul 2021 05:53  Mg     1.9     07-09    TPro  6.5  /  Alb  4.0  /  TBili  0.3  /  DBili  x   /  AST  11  /  ALT  5   /  AlkPhos  70  07-10        < from: 12 Lead ECG (07.08.21 @ 05:34) >  Diagnosis Line Sinus rhythm with 1st degree A-V block    < end of copied text >      RADIOLOGY  < from: CT Head No Cont (07.08.21 @ 10:52) >    IMPRESSION:    No acute intracranial pathology.    < end of copied text >      MEDICATIONS  (STANDING):  amLODIPine   Tablet 10 milliGRAM(s) Oral at bedtime  aspirin enteric coated 81 milliGRAM(s) Oral daily  busPIRone 15 milliGRAM(s) Oral two times a day  chlorhexidine 4% Liquid 1 Application(s) Topical every 12 hours  diVALproex  milliGRAM(s) Oral two times a day  enoxaparin Injectable 40 milliGRAM(s) SubCutaneous daily  haloperidol     Tablet 5 milliGRAM(s) Oral at bedtime  labetalol 100 milliGRAM(s) Oral two times a day  losartan 100 milliGRAM(s) Oral daily  nicotine -  14 mG/24Hr(s) Patch 1 patch Transdermal daily  nystatin Powder 1 Application(s) Topical two times a day    MEDICATIONS  (PRN):

## 2021-07-10 NOTE — PROGRESS NOTE ADULT - ASSESSMENT
Severe hyponatremia improving    Urine chemistries c/w poor solute intake w/ relatively excessive solute-free fluid intake    No IVF's    Continue PO water restriction    Encourage intake of solid food, namely protein    SNa+ in AM

## 2021-07-10 NOTE — PROGRESS NOTE ADULT - ASSESSMENT
IMPRESSION:  severe hyponatremia secondary to psychogenic polydipsia  suicidal ideations  hypokalemia  HO HTN  HO schizophrenia    PLAN:    CNS: avoid depressants. continue VA. Check level  psych eval appreciated    HEENT: Oral care    PULMONARY:  HOB @ 45 degrees.  Aspiration precautions     CARDIOVASCULAR:   Goal map >65.   labetalol 100 q12h    GI: GI prophylaxis.  Feeding.  Bowel regimen     RENAL:  Follow up lytes.  Correct as needed.   QRound BMP and adjust fluids accordingly.  would d/c D5W    INFECTIOUS DISEASE: Follow up cultures    HEMATOLOGICAL:  DVT prophylaxis.    ENDOCRINE:  Follow up FS.  Insulin protocol if needed.    MUSCULOSKELETAL: bedrest    Dispo: MICU             IMPRESSION:  severe hyponatremia secondary to psychogenic polydipsia  suicidal ideations  hypokalemia  HO HTN  HO schizophrenia    PLAN:    CNS: avoid depressants. continue VA. Check level  psych eval appreciated  do not let pt have free access to water other liquids    HEENT: Oral care    PULMONARY:  HOB @ 45 degrees.  Aspiration precautions     CARDIOVASCULAR:   Goal map >65.   labetalol 100 q12h    GI: GI prophylaxis.  Feeding.  Bowel regimen     RENAL:  Follow up lytes.  Correct as needed.   would d/c D5W    INFECTIOUS DISEASE: Follow up cultures    HEMATOLOGICAL:  DVT prophylaxis.    ENDOCRINE:  Follow up FS.  Insulin protocol if needed.    MUSCULOSKELETAL: bedrest    Dispo: can go to [facility]

## 2021-07-11 LAB
ALBUMIN SERPL ELPH-MCNC: 3.9 G/DL — SIGNIFICANT CHANGE UP (ref 3.5–5.2)
ALP SERPL-CCNC: 68 U/L — SIGNIFICANT CHANGE UP (ref 30–115)
ALT FLD-CCNC: 5 U/L — SIGNIFICANT CHANGE UP (ref 0–41)
ANION GAP SERPL CALC-SCNC: 12 MMOL/L — SIGNIFICANT CHANGE UP (ref 7–14)
AST SERPL-CCNC: 12 U/L — SIGNIFICANT CHANGE UP (ref 0–41)
BASOPHILS # BLD AUTO: 0.08 K/UL — SIGNIFICANT CHANGE UP (ref 0–0.2)
BASOPHILS NFR BLD AUTO: 0.8 % — SIGNIFICANT CHANGE UP (ref 0–1)
BILIRUB SERPL-MCNC: 0.4 MG/DL — SIGNIFICANT CHANGE UP (ref 0.2–1.2)
BUN SERPL-MCNC: 13 MG/DL — SIGNIFICANT CHANGE UP (ref 10–20)
CALCIUM SERPL-MCNC: 9.6 MG/DL — SIGNIFICANT CHANGE UP (ref 8.5–10.1)
CHLORIDE SERPL-SCNC: 93 MMOL/L — LOW (ref 98–110)
CO2 SERPL-SCNC: 25 MMOL/L — SIGNIFICANT CHANGE UP (ref 17–32)
CREAT SERPL-MCNC: 0.8 MG/DL — SIGNIFICANT CHANGE UP (ref 0.7–1.5)
EOSINOPHIL # BLD AUTO: 0.31 K/UL — SIGNIFICANT CHANGE UP (ref 0–0.7)
EOSINOPHIL NFR BLD AUTO: 3.1 % — SIGNIFICANT CHANGE UP (ref 0–8)
GLUCOSE SERPL-MCNC: 87 MG/DL — SIGNIFICANT CHANGE UP (ref 70–99)
HCT VFR BLD CALC: 39.9 % — LOW (ref 42–52)
HGB BLD-MCNC: 13.6 G/DL — LOW (ref 14–18)
IMM GRANULOCYTES NFR BLD AUTO: 0.8 % — HIGH (ref 0.1–0.3)
LYMPHOCYTES # BLD AUTO: 2.62 K/UL — SIGNIFICANT CHANGE UP (ref 1.2–3.4)
LYMPHOCYTES # BLD AUTO: 26.1 % — SIGNIFICANT CHANGE UP (ref 20.5–51.1)
MCHC RBC-ENTMCNC: 27.9 PG — SIGNIFICANT CHANGE UP (ref 27–31)
MCHC RBC-ENTMCNC: 34.1 G/DL — SIGNIFICANT CHANGE UP (ref 32–37)
MCV RBC AUTO: 81.9 FL — SIGNIFICANT CHANGE UP (ref 80–94)
MONOCYTES # BLD AUTO: 0.96 K/UL — HIGH (ref 0.1–0.6)
MONOCYTES NFR BLD AUTO: 9.6 % — HIGH (ref 1.7–9.3)
NEUTROPHILS # BLD AUTO: 6 K/UL — SIGNIFICANT CHANGE UP (ref 1.4–6.5)
NEUTROPHILS NFR BLD AUTO: 59.6 % — SIGNIFICANT CHANGE UP (ref 42.2–75.2)
NRBC # BLD: 0 /100 WBCS — SIGNIFICANT CHANGE UP (ref 0–0)
PLATELET # BLD AUTO: 213 K/UL — SIGNIFICANT CHANGE UP (ref 130–400)
POTASSIUM SERPL-MCNC: 4.4 MMOL/L — SIGNIFICANT CHANGE UP (ref 3.5–5)
POTASSIUM SERPL-SCNC: 4.4 MMOL/L — SIGNIFICANT CHANGE UP (ref 3.5–5)
PROT SERPL-MCNC: 6.6 G/DL — SIGNIFICANT CHANGE UP (ref 6–8)
RBC # BLD: 4.87 M/UL — SIGNIFICANT CHANGE UP (ref 4.7–6.1)
RBC # FLD: 14.6 % — HIGH (ref 11.5–14.5)
SARS-COV-2 RNA SPEC QL NAA+PROBE: SIGNIFICANT CHANGE UP
SODIUM SERPL-SCNC: 130 MMOL/L — LOW (ref 135–146)
WBC # BLD: 10.05 K/UL — SIGNIFICANT CHANGE UP (ref 4.8–10.8)
WBC # FLD AUTO: 10.05 K/UL — SIGNIFICANT CHANGE UP (ref 4.8–10.8)

## 2021-07-11 PROCEDURE — 99232 SBSQ HOSP IP/OBS MODERATE 35: CPT

## 2021-07-11 PROCEDURE — 93010 ELECTROCARDIOGRAM REPORT: CPT

## 2021-07-11 PROCEDURE — 99233 SBSQ HOSP IP/OBS HIGH 50: CPT

## 2021-07-11 RX ORDER — HYDROXYZINE HCL 10 MG
25 TABLET ORAL AT BEDTIME
Refills: 0 | Status: DISCONTINUED | OUTPATIENT
Start: 2021-07-11 | End: 2021-07-12

## 2021-07-11 RX ADMIN — Medication 1 PATCH: at 13:40

## 2021-07-11 RX ADMIN — DIVALPROEX SODIUM 500 MILLIGRAM(S): 500 TABLET, DELAYED RELEASE ORAL at 06:45

## 2021-07-11 RX ADMIN — DIVALPROEX SODIUM 500 MILLIGRAM(S): 500 TABLET, DELAYED RELEASE ORAL at 17:57

## 2021-07-11 RX ADMIN — Medication 1 PATCH: at 19:07

## 2021-07-11 RX ADMIN — LOSARTAN POTASSIUM 100 MILLIGRAM(S): 100 TABLET, FILM COATED ORAL at 06:45

## 2021-07-11 RX ADMIN — Medication 100 MILLIGRAM(S): at 17:57

## 2021-07-11 RX ADMIN — HALOPERIDOL DECANOATE 5 MILLIGRAM(S): 100 INJECTION INTRAMUSCULAR at 21:40

## 2021-07-11 RX ADMIN — Medication 1 PATCH: at 06:46

## 2021-07-11 RX ADMIN — Medication 81 MILLIGRAM(S): at 13:16

## 2021-07-11 RX ADMIN — NYSTATIN CREAM 1 APPLICATION(S): 100000 CREAM TOPICAL at 17:58

## 2021-07-11 RX ADMIN — Medication 100 MILLIGRAM(S): at 06:45

## 2021-07-11 RX ADMIN — Medication 15 MILLIGRAM(S): at 06:45

## 2021-07-11 RX ADMIN — AMLODIPINE BESYLATE 10 MILLIGRAM(S): 2.5 TABLET ORAL at 21:40

## 2021-07-11 RX ADMIN — CHLORHEXIDINE GLUCONATE 1 APPLICATION(S): 213 SOLUTION TOPICAL at 09:19

## 2021-07-11 RX ADMIN — NYSTATIN CREAM 1 APPLICATION(S): 100000 CREAM TOPICAL at 06:45

## 2021-07-11 RX ADMIN — Medication 25 MILLIGRAM(S): at 23:55

## 2021-07-11 RX ADMIN — ENOXAPARIN SODIUM 40 MILLIGRAM(S): 100 INJECTION SUBCUTANEOUS at 13:16

## 2021-07-11 RX ADMIN — Medication 15 MILLIGRAM(S): at 17:57

## 2021-07-11 RX ADMIN — CHLORHEXIDINE GLUCONATE 1 APPLICATION(S): 213 SOLUTION TOPICAL at 17:58

## 2021-07-11 RX ADMIN — Medication 1 PATCH: at 13:17

## 2021-07-11 NOTE — PROGRESS NOTE BEHAVIORAL HEALTH - NSBHFUPSUICINTERVALFT_PSY_A_CORE
chronic passive suicidal ideation, but is deemed low risk base on history and risk factors
chronic passive suicidal ideation on and off, but is deemed low risk base on history and risk factors

## 2021-07-11 NOTE — PROGRESS NOTE ADULT - ASSESSMENT
61 yo M with PMH of schizophrenia, HLD, depression, HTN presenting for suicidal ideation. Patient states that he wants to cut his wrists, pt found with hyponatremia    hyponatremia  secondary to psychogenic polydipsia / suicidal ideation     - sodium now 130   - fluid restrict and medically stable for DC   - cleared by psych   - continue home meds   - DVT prophylaxis         Electronic Signatures:  61 yo M with PMH of schizophrenia, HLD, depression, HTN presenting for suicidal ideation. Patient states that he wants to cut his wrists, pt found with hyponatremia    hyponatremia  secondary to psychogenic polydipsia / suicidal ideation     - sodium now 130   - fluid restrict and medically stable for DC   -  psych f/u prior to DC as pt still verbalizing suicidal ideation    - continue home meds   - DVT prophylaxis         Electronic Signatures:

## 2021-07-11 NOTE — PROGRESS NOTE BEHAVIORAL HEALTH - SUMMARY
59yo m with h/o schizoaffective d/o admitted to ICU due to hyponatremia and elevated troponin, presents with disorganised t/p, vague AH, and SI with thoughts of cutting self. Pt reports that he had these SI for "longer than a year", and never acted on them. He is unable to identify triggers but is focused on moving to [place] where he reportedly can have more comfort then in his current residence. This presentation is very similar to his prior presentations to this hospital. According to [facility] staff, there was no recent change in pt's presentation, no signs of worsening depression, no recent medication change, no suicidal statements at the residence. Although pt reports SI at times he never acted on SI while at [facility].
61yo m with h/o schizoaffective d/o admitted initially to ICU due to hyponatremia and elevated troponin, presents with intermittent SI with thoughts of cutting self. Pt reports that he had these SI for "longer than a year", and never acted on them. He is unable to identify triggers but is focused on moving to [place] where he reportedly can have more comfort then in his current residence. This presentation is very similar to his prior presentations to this hospital. According to [facility] staff, there was no recent change in pt's presentation, no signs of worsening depression, no recent medication change, no suicidal statements at the residence. Although pt reports SI at times he never acted on SI while at [facility].    Currently pt is denying SI to me and makes future plans.

## 2021-07-11 NOTE — PROGRESS NOTE ADULT - SUBJECTIVE AND OBJECTIVE BOX
Patient is a 60y old  Male who presents with a chief complaint of severe hyponatremia, (10 Jul 2021 18:56)      T(F): 99.3 (07-11-21 @ 05:18), Max: 99.3 (07-11-21 @ 05:18)  HR: 87 (07-11-21 @ 05:18)  BP: 123/78 (07-11-21 @ 05:18)  RR: 18 (07-11-21 @ 05:18)  SpO2: 96% (07-10-21 @ 15:09) (96% - 98%)    PHYSICAL EXAM:  GENERAL: NAD  HEAD:  Atraumatic, Normocephalic  EYES: EOMI, PERRLA, conjunctiva and sclera clear  NERVOUS SYSTEM:  Alert & Oriented X3, no focal deficits   CHEST/LUNG: Clear to percussion bilaterally; No rales, rhonchi, wheezing, or rubs  HEART: Regular rate and rhythm; No murmurs, rubs, or gallops  ABDOMEN: Soft, Nontender, Nondistended; Bowel sounds present  EXTREMITIES:  2+ Peripheral Pulses, No clubbing, cyanosis, or edema    LABS  07-11    130<L>  |  93<L>  |  13  ----------------------------<  87  4.4   |  25  |  0.8    Ca    9.6      11 Jul 2021 07:33    TPro  6.6  /  Alb  3.9  /  TBili  0.4  /  DBili  x   /  AST  12  /  ALT  5   /  AlkPhos  68  07-11                          13.6   10.05 )-----------( 213      ( 11 Jul 2021 07:33 )             39.9     CARDIAC ENZYMES    Troponin T, Serum: 0.02 ng/mL (07-08-21 @ 11:16)    RADIOLOGY  < from: CT Head No Cont (07.08.21 @ 10:52) >    IMPRESSION:    No acute intracranial pathology.    < end of copied text >    MEDICATIONS  (STANDING):  amLODIPine   Tablet 10 milliGRAM(s) Oral at bedtime  aspirin enteric coated 81 milliGRAM(s) Oral daily  busPIRone 15 milliGRAM(s) Oral two times a day  chlorhexidine 4% Liquid 1 Application(s) Topical every 12 hours  diVALproex  milliGRAM(s) Oral two times a day  enoxaparin Injectable 40 milliGRAM(s) SubCutaneous daily  haloperidol     Tablet 5 milliGRAM(s) Oral at bedtime  labetalol 100 milliGRAM(s) Oral two times a day  losartan 100 milliGRAM(s) Oral daily  nicotine -  14 mG/24Hr(s) Patch 1 patch Transdermal daily  nystatin Powder 1 Application(s) Topical two times a day    MEDICATIONS  (PRN):

## 2021-07-11 NOTE — PROGRESS NOTE BEHAVIORAL HEALTH - NSBHFUPINTERVALHXFT_PSY_A_CORE
As per assessment,"61yo m with h/o schizoaffective d/o admitted to ICU due to hyponatremia and elevated troponin, presents with disorganised t/p, vague AH, and SI with thoughts of cutting self. Pt reports that he had these SI for "longer than a year", and never acted on them. He is unable to identify triggers but is focused on moving to [place] where he reportedly can have more comfort then in his current residence. This presentation is very similar to his prior presentations to this hospital. According to [facility] staff, there was no recent change in pt's presentation, no signs of worsening depression, no recent medication change, no suicidal statements at the residence. Although pt reports SI at times he never acted on SI while at [facility]."    Patient was seen and evaluated again today as per Internal Medicine request, after he again voiced SI with thoughts of cutting his arm to  staff. Currently pt presents calm, cooperative, with flat affect. He denied SI to me during this interview, stating that he wants to go back to [facility], "to take my meds and to smoke tobacco". As per pt's nurse Dominique, pt does not cause any acute behavioral problems, is calm, eating, drinking and sleeping well, not appearing significantly depressed. He voices SI on and off which he had for years. He also reports AH which are "hard to explain", and denies any CAH.    He denies any suicidal intent or plan.  He reports fair sleep and appetite.  He understands he is here because of low sodium.  He is focused on a change of housing.  Chart reviewed.  Patient reports being on many prior medications that were helpful.  When he was discharged from [facility] 2 years ago for depression and suicidal ideation, he was additionally on quetiapine 400mg at bedtime.  Side effects, risks, benefits discussed.  Patient agrees to resume quetiapine 100mg at bedtime.
As per assessment,"61yo m with h/o schizoaffective d/o admitted to ICU due to hyponatremia and elevated troponin, presents with disorganised t/p, vague AH, and SI with thoughts of cutting self. Pt reports that he had these SI for "longer than a year", and never acted on them. He is unable to identify triggers but is focused on moving to [place] where he reportedly can have more comfort then in his current residence. This presentation is very similar to his prior presentations to this hospital. According to [facility] staff, there was no recent change in pt's presentation, no signs of worsening depression, no recent medication change, no suicidal statements at the residence. Although pt reports SI at times he never acted on SI while at [facility]."    Patient. seen and evaluated again today and has same complaints.  He denies any suicidal intent or plan.  He reports fair sleep and appetite.  He understands he is here because of low sodium.  He is focused on a change of housing.  Chart reviewed.  Patient reports being on many prior medications that were helpful.  When he was discharged from [facility] 2 years ago for depression and suicidal ideation, he was additionally on quetiapine 400mg at bedtime.  Side effects, risks, benefits discussed.  Patient agrees to resume quetiapine 100mg at bedtime.
Patient seen, examined, and interviewed by me, case discussed with me, chart reviewed, agree with above H/P as reviewed and edited by me.  See  full note above.  had a long discussion with pt, family at bedside, house staff, Rn.  Pt feels better but has not tried PO yet..   Pt wants to go home  pt does not want to stay for further workup, cardiac eval..  surgical note appreciate d and discussed with Pt to advance diet as tolerated.  pt to sign out AMA.  will discus with PMD

## 2021-07-11 NOTE — PROGRESS NOTE BEHAVIORAL HEALTH - NSBHCHARTREVIEWIMAGING_PSY_A_CORE FT
EXAM:  CT BRAIN          PROCEDURE DATE:  07/08/2021      INTERPRETATION:  CLINICAL INDICATION: Altered mental status    TECHNIQUE: CT of the head was performed without the administration of intravenous contrast.    COMPARISON: CT head dated 7/11/2017.    FINDINGS:    There is prominence of the sulci, sylvian fissures, and ventricles, reflecting stable mild diffuse parenchymal volume loss.     There is no evidence of acute territorial/transcortical infarction or intracranial hemorrhage. There is no space-occupying lesion or midline shift.    There is no evidence of hydrocephalus. There are no extra-axial fluid collections.    The visualized intraorbital contents are normal. There is mild inflammatory mucosal thickening of the ethmoid sinuses.The mastoid air cells are aerated. The visualized soft tissues and osseous structures appear normal. Partially visualized parotid glands are normal.      IMPRESSION:    No acute intracranial pathology.      --- End of Report ---    NETO JONAS MD; Attending Radiologist  This document has been electronically signed. Jul 8 2021  2:42PM
EXAM:  CT BRAIN          PROCEDURE DATE:  07/08/2021      INTERPRETATION:  CLINICAL INDICATION: Altered mental status    TECHNIQUE: CT of the head was performed without the administration of intravenous contrast.    COMPARISON: CT head dated 7/11/2017.    FINDINGS:    There is prominence of the sulci, sylvian fissures, and ventricles, reflecting stable mild diffuse parenchymal volume loss.     There is no evidence of acute territorial/transcortical infarction or intracranial hemorrhage. There is no space-occupying lesion or midline shift.    There is no evidence of hydrocephalus. There are no extra-axial fluid collections.    The visualized intraorbital contents are normal. There is mild inflammatory mucosal thickening of the ethmoid sinuses.The mastoid air cells are aerated. The visualized soft tissues and osseous structures appear normal. Partially visualized parotid glands are normal.      IMPRESSION:    No acute intracranial pathology.      --- End of Report ---    NETO JONAS MD; Attending Radiologist  This document has been electronically signed. Jul 8 2021  2:42PM

## 2021-07-11 NOTE — PROGRESS NOTE BEHAVIORAL HEALTH - NSBHFUPINTERVALCCFT_PSY_A_CORE
"I feel so-so, my mood is in between"
"I am suicidal."  Patient clarifies he has had persistent suicidal ideation for "years."  He states he really just wants to leave [facility].  He wants to return to live in [place] or go back to [facility]

## 2021-07-11 NOTE — PROGRESS NOTE BEHAVIORAL HEALTH - NSBHCHARTREVIEWINVESTIGATE_PSY_A_CORE FT
Ventricular Rate 64 BPM    Atrial Rate 64 BPM    P-R Interval 252 ms    QRS Duration 108 ms    Q-T Interval 494 ms    QTC Calculation(Bazett) 509 ms    P Axis 62 degrees    R Axis -9 degrees    T Axis 17 degrees    Diagnosis Line Sinus rhythm with 1st degree A-V block    Confirmed by ARACELI DRIVER MD [id number]) on 7/8/2021 12:48:44 PM
Ventricular Rate 64 BPM    Atrial Rate 64 BPM    P-R Interval 252 ms    QRS Duration 108 ms    Q-T Interval 494 ms    QTC Calculation(Bazett) 509 ms    P Axis 62 degrees    R Axis -9 degrees    T Axis 17 degrees    Diagnosis Line Sinus rhythm with 1st degree A-V block    Confirmed by ARACELI DRIVER MD ([id number]) on 7/8/2021 12:48:44 PM

## 2021-07-11 NOTE — PROGRESS NOTE BEHAVIORAL HEALTH - NSBHCHARTREVIEWLAB_PSY_A_CORE FT
13.6   10.05 )-----------( 213      ( 11 Jul 2021 07:33 )             39.9   07-11    130<L>  |  93<L>  |  13  ----------------------------<  87  4.4   |  25  |  0.8    Ca    9.6      11 Jul 2021 07:33    TPro  6.6  /  Alb  3.9  /  TBili  0.4  /  DBili  x   /  AST  12  /  ALT  5   /  AlkPhos  68  07-11
11.7   8.37  )-----------( 173      ( 08 Jul 2021 02:55 )             31.6   07-08    123<L>  |  89<L>  |  6<L>  ----------------------------<  117<H>  3.5   |  27  |  0.6<L>    Ca    9.4      08 Jul 2021 15:18  Mg     1.7     07-08    TPro  7.1  /  Alb  4.5  /  TBili  0.3  /  DBili  x   /  AST  13  /  ALT  6   /  AlkPhos  83  07-08

## 2021-07-11 NOTE — PROGRESS NOTE BEHAVIORAL HEALTH - PROBLEM SELECTOR PLAN 1
Continue VPA 500mg BID, check level.  Continue Haldol 5mg HS, monitor QTc  Can consider to discontinue BuSpar as there is no indications or obvious benefits.    Patient is not acute danger to self/others at this time. Patient can be safely discharged back to residence if medically cleared.
Continue VPA 500mg BID, check level.  Continue Haldol 5mg HS, monitor QTc  Can consider to discontinue BuSpar as there is no indications or obvious benefits.    Discussed with hospitalist MD, suggest adding quetiapine 100mg at bedtime if QTC >450. Monitor daily.    Otherwise, patient is not acute danger to self/others at this time.  Reconsult as needed.

## 2021-07-11 NOTE — PROGRESS NOTE BEHAVIORAL HEALTH - NSBHCHARTREVIEWVS_PSY_A_CORE FT
Vital Signs Last 24 Hrs  T(C): 36.2 (11 Jul 2021 14:06), Max: 37.4 (11 Jul 2021 05:18)  T(F): 97.1 (11 Jul 2021 14:06), Max: 99.3 (11 Jul 2021 05:18)  HR: 86 (11 Jul 2021 14:06) (86 - 89)  BP: 126/87 (11 Jul 2021 14:06) (123/78 - 129/78)  BP(mean): --  RR: 18 (11 Jul 2021 14:06) (16 - 18)  SpO2: --
Vital Signs Last 24 Hrs  T(C): 37 (08 Jul 2021 15:00), Max: 37 (08 Jul 2021 15:00)  T(F): 98.6 (08 Jul 2021 15:00), Max: 98.6 (08 Jul 2021 15:00)  HR: 84 (08 Jul 2021 16:00) (64 - 84)  BP: 146/81 (08 Jul 2021 16:00) (118/53 - 169/87)  BP(mean): 106 (08 Jul 2021 16:00) (77 - 126)  RR: 20 (08 Jul 2021 16:00) (13 - 20)  SpO2: 97% (08 Jul 2021 16:00) (94% - 98%)  ICU Vital Signs Last 24 Hrs  T(C): 36.6 (09 Jul 2021 11:00), Max: 36.8 (08 Jul 2021 21:02)  T(F): 97.8 (09 Jul 2021 11:00), Max: 98.2 (08 Jul 2021 21:02)  HR: 80 (09 Jul 2021 17:51) (76 - 106)  BP: 156/81 (09 Jul 2021 17:51) (132/85 - 180/85)  BP(mean): 108 (09 Jul 2021 17:51) (88 - 128)  ABP: --  ABP(mean): --  RR: 20 (09 Jul 2021 17:51) (11 - 103)  SpO2: 96% (09 Jul 2021 17:51) (94% - 99%)

## 2021-07-12 ENCOUNTER — EMERGENCY (EMERGENCY)
Facility: HOSPITAL | Age: 61
LOS: 0 days | Discharge: HOME | End: 2021-07-12
Attending: EMERGENCY MEDICINE | Admitting: EMERGENCY MEDICINE
Payer: MEDICARE

## 2021-07-12 ENCOUNTER — TRANSCRIPTION ENCOUNTER (OUTPATIENT)
Age: 61
End: 2021-07-12

## 2021-07-12 VITALS
SYSTOLIC BLOOD PRESSURE: 143 MMHG | OXYGEN SATURATION: 100 % | HEIGHT: 68 IN | TEMPERATURE: 98 F | HEART RATE: 78 BPM | WEIGHT: 250 LBS | RESPIRATION RATE: 20 BRPM | DIASTOLIC BLOOD PRESSURE: 69 MMHG

## 2021-07-12 VITALS
HEART RATE: 85 BPM | RESPIRATION RATE: 18 BRPM | DIASTOLIC BLOOD PRESSURE: 75 MMHG | SYSTOLIC BLOOD PRESSURE: 116 MMHG | TEMPERATURE: 98 F

## 2021-07-12 DIAGNOSIS — Z79.899 OTHER LONG TERM (CURRENT) DRUG THERAPY: ICD-10-CM

## 2021-07-12 DIAGNOSIS — Z02.9 ENCOUNTER FOR ADMINISTRATIVE EXAMINATIONS, UNSPECIFIED: ICD-10-CM

## 2021-07-12 DIAGNOSIS — I10 ESSENTIAL (PRIMARY) HYPERTENSION: ICD-10-CM

## 2021-07-12 DIAGNOSIS — E78.5 HYPERLIPIDEMIA, UNSPECIFIED: ICD-10-CM

## 2021-07-12 DIAGNOSIS — E87.1 HYPO-OSMOLALITY AND HYPONATREMIA: ICD-10-CM

## 2021-07-12 DIAGNOSIS — R45.851 SUICIDAL IDEATIONS: ICD-10-CM

## 2021-07-12 DIAGNOSIS — Z86.59 PERSONAL HISTORY OF OTHER MENTAL AND BEHAVIORAL DISORDERS: ICD-10-CM

## 2021-07-12 DIAGNOSIS — F20.9 SCHIZOPHRENIA, UNSPECIFIED: ICD-10-CM

## 2021-07-12 DIAGNOSIS — Z79.82 LONG TERM (CURRENT) USE OF ASPIRIN: ICD-10-CM

## 2021-07-12 DIAGNOSIS — Z88.8 ALLERGY STATUS TO OTHER DRUGS, MEDICAMENTS AND BIOLOGICAL SUBSTANCES: ICD-10-CM

## 2021-07-12 LAB
ALBUMIN SERPL ELPH-MCNC: 3.7 G/DL — SIGNIFICANT CHANGE UP (ref 3.5–5.2)
ALP SERPL-CCNC: 59 U/L — SIGNIFICANT CHANGE UP (ref 30–115)
ALT FLD-CCNC: <5 U/L — SIGNIFICANT CHANGE UP (ref 0–41)
ANION GAP SERPL CALC-SCNC: 13 MMOL/L — SIGNIFICANT CHANGE UP (ref 7–14)
AST SERPL-CCNC: 15 U/L — SIGNIFICANT CHANGE UP (ref 0–41)
BASOPHILS # BLD AUTO: 0.07 K/UL — SIGNIFICANT CHANGE UP (ref 0–0.2)
BASOPHILS NFR BLD AUTO: 0.8 % — SIGNIFICANT CHANGE UP (ref 0–1)
BILIRUB SERPL-MCNC: 0.4 MG/DL — SIGNIFICANT CHANGE UP (ref 0.2–1.2)
BUN SERPL-MCNC: 15 MG/DL — SIGNIFICANT CHANGE UP (ref 10–20)
CALCIUM SERPL-MCNC: 9.4 MG/DL — SIGNIFICANT CHANGE UP (ref 8.5–10.1)
CHLORIDE SERPL-SCNC: 92 MMOL/L — LOW (ref 98–110)
CO2 SERPL-SCNC: 24 MMOL/L — SIGNIFICANT CHANGE UP (ref 17–32)
CREAT SERPL-MCNC: 0.7 MG/DL — SIGNIFICANT CHANGE UP (ref 0.7–1.5)
EOSINOPHIL # BLD AUTO: 0.39 K/UL — SIGNIFICANT CHANGE UP (ref 0–0.7)
EOSINOPHIL NFR BLD AUTO: 4.5 % — SIGNIFICANT CHANGE UP (ref 0–8)
GLUCOSE SERPL-MCNC: 81 MG/DL — SIGNIFICANT CHANGE UP (ref 70–99)
HCT VFR BLD CALC: 34.5 % — LOW (ref 42–52)
HGB BLD-MCNC: 12.1 G/DL — LOW (ref 14–18)
IMM GRANULOCYTES NFR BLD AUTO: 0.6 % — HIGH (ref 0.1–0.3)
LYMPHOCYTES # BLD AUTO: 2.01 K/UL — SIGNIFICANT CHANGE UP (ref 1.2–3.4)
LYMPHOCYTES # BLD AUTO: 23.2 % — SIGNIFICANT CHANGE UP (ref 20.5–51.1)
MCHC RBC-ENTMCNC: 28.3 PG — SIGNIFICANT CHANGE UP (ref 27–31)
MCHC RBC-ENTMCNC: 35.1 G/DL — SIGNIFICANT CHANGE UP (ref 32–37)
MCV RBC AUTO: 80.8 FL — SIGNIFICANT CHANGE UP (ref 80–94)
MONOCYTES # BLD AUTO: 1 K/UL — HIGH (ref 0.1–0.6)
MONOCYTES NFR BLD AUTO: 11.5 % — HIGH (ref 1.7–9.3)
NEUTROPHILS # BLD AUTO: 5.16 K/UL — SIGNIFICANT CHANGE UP (ref 1.4–6.5)
NEUTROPHILS NFR BLD AUTO: 59.4 % — SIGNIFICANT CHANGE UP (ref 42.2–75.2)
NRBC # BLD: 0 /100 WBCS — SIGNIFICANT CHANGE UP (ref 0–0)
PLATELET # BLD AUTO: 152 K/UL — SIGNIFICANT CHANGE UP (ref 130–400)
POTASSIUM SERPL-MCNC: 3.9 MMOL/L — SIGNIFICANT CHANGE UP (ref 3.5–5)
POTASSIUM SERPL-SCNC: 3.9 MMOL/L — SIGNIFICANT CHANGE UP (ref 3.5–5)
PROT SERPL-MCNC: 6.2 G/DL — SIGNIFICANT CHANGE UP (ref 6–8)
RBC # BLD: 4.27 M/UL — LOW (ref 4.7–6.1)
RBC # FLD: 14.3 % — SIGNIFICANT CHANGE UP (ref 11.5–14.5)
SODIUM SERPL-SCNC: 129 MMOL/L — LOW (ref 135–146)
WBC # BLD: 8.68 K/UL — SIGNIFICANT CHANGE UP (ref 4.8–10.8)
WBC # FLD AUTO: 8.68 K/UL — SIGNIFICANT CHANGE UP (ref 4.8–10.8)

## 2021-07-12 PROCEDURE — 99233 SBSQ HOSP IP/OBS HIGH 50: CPT

## 2021-07-12 PROCEDURE — 99283 EMERGENCY DEPT VISIT LOW MDM: CPT

## 2021-07-12 RX ORDER — LABETALOL HCL 100 MG
1 TABLET ORAL
Qty: 60 | Refills: 0
Start: 2021-07-12 | End: 2021-08-10

## 2021-07-12 RX ADMIN — Medication 1 PATCH: at 11:23

## 2021-07-12 RX ADMIN — Medication 15 MILLIGRAM(S): at 06:40

## 2021-07-12 RX ADMIN — Medication 1 PATCH: at 14:38

## 2021-07-12 RX ADMIN — Medication 100 MILLIGRAM(S): at 11:27

## 2021-07-12 RX ADMIN — DIVALPROEX SODIUM 500 MILLIGRAM(S): 500 TABLET, DELAYED RELEASE ORAL at 06:40

## 2021-07-12 RX ADMIN — Medication 81 MILLIGRAM(S): at 11:28

## 2021-07-12 RX ADMIN — ENOXAPARIN SODIUM 40 MILLIGRAM(S): 100 INJECTION SUBCUTANEOUS at 11:27

## 2021-07-12 RX ADMIN — NYSTATIN CREAM 1 APPLICATION(S): 100000 CREAM TOPICAL at 06:41

## 2021-07-12 RX ADMIN — LOSARTAN POTASSIUM 100 MILLIGRAM(S): 100 TABLET, FILM COATED ORAL at 11:27

## 2021-07-12 RX ADMIN — Medication 1 PATCH: at 11:27

## 2021-07-12 NOTE — PROGRESS NOTE ADULT - ASSESSMENT
61 yo M with PMH of schizophrenia, HLD, depression, HTN presenting for suicidal ideation. Patient states that he wants to cut his wrists, pt found with hyponatremia    hyponatremia  secondary to psychogenic polydipsia / suicidal ideation     - sodium now 130   - fluid restrict and medically stable for DC   -  psych f/u --> cleared pt for DC on psych meds   - continue home meds   - 34min spent on DC

## 2021-07-12 NOTE — DISCHARGE NOTE NURSING/CASE MANAGEMENT/SOCIAL WORK - NSDCPEWEB_GEN_ALL_CORE
[facility] for Tobacco Control website --- [email or web address] website --- [email or web address]

## 2021-07-12 NOTE — PROGRESS NOTE ADULT - SUBJECTIVE AND OBJECTIVE BOX
Nephrology progress note    Patient is seen and examined, events over the last 24 h noted .    Allergies:  tetanus toxoid (Swelling)    Hospital Medications:   MEDICATIONS  (STANDING):  amLODIPine   Tablet 10 milliGRAM(s) Oral at bedtime  aspirin enteric coated 81 milliGRAM(s) Oral daily  busPIRone 15 milliGRAM(s) Oral two times a day  chlorhexidine 4% Liquid 1 Application(s) Topical every 12 hours  diVALproex  milliGRAM(s) Oral two times a day  enoxaparin Injectable 40 milliGRAM(s) SubCutaneous daily  haloperidol     Tablet 5 milliGRAM(s) Oral at bedtime  labetalol 100 milliGRAM(s) Oral two times a day  losartan 100 milliGRAM(s) Oral daily  nicotine -  14 mG/24Hr(s) Patch 1 patch Transdermal daily  nystatin Powder 1 Application(s) Topical two times a day        VITALS:  T(F): 97.6 (21 @ 14:17), Max: 97.6 (21 @ 14:17)  HR: 85 (21 @ 14:17)  BP: 116/75 (21 @ 14:17)  RR: 18 (21 @ 14:17)  SpO2: --  Wt(kg): --    07-10 @ 07:  -   @ 07:00  --------------------------------------------------------  IN: 780 mL / OUT: 1560 mL / NET: -780 mL     @ 07:  -   @ 07:00  --------------------------------------------------------  IN: 0 mL / OUT: 1650 mL / NET: -1650 mL    0712 @ 07:01  -   @ 14:23  --------------------------------------------------------  IN: 0 mL / OUT: 450 mL / NET: -450 mL          PHYSICAL EXAM:  Constitutional: NAD  HEENT: anicteric sclera, oropharynx clear, MMM  Neck: No JVD  Respiratory: CTAB, no wheezes, rales or rhonchi  Cardiovascular: S1, S2, RRR  Gastrointestinal: BS+, soft, NT/ND  Extremities: No peripheral edema  Neurological: A/O x 3  : No CVA tenderness. No burroughs.   Skin: No rashes  Vascular Access:    LABS:      129<L>  |  92<L>  |  15  ----------------------------<  81  3.9   |  24  |  0.7  SODIUM TREND:  Sodium 129 [ @ 07:15]  Sodium 130 [ @ 07:33]  Sodium 126 [07-10 @ 05:53]  Sodium 124 [ @ 21:41]  Sodium 121 [ @ 19:01]  Sodium 118 [ @ 15:45]  Sodium 118 [ @ 11:14]  Sodium 118 [ @ 05:14]  Sodium 123 [ @ 21:34]  Sodium 122 [ @ 19:10]    Ca    9.4      2021 07:15    TPro  6.2  /  Alb  3.7  /  TBili  0.4  /  DBili      /  AST  15  /  ALT  <5  /  AlkPhos  59                            12.1   8.68  )-----------( 152      ( 2021 07:15 )             34.5       Urine Studies:  Urinalysis Basic - ( 2021 08:00 )    Color: Yellow / Appearance: Clear / S.015 / pH:   Gluc:  / Ketone: Negative  / Bili: Negative / Urobili: 0.2 mg/dL   Blood:  / Protein: Negative mg/dL / Nitrite: Negative   Leuk Esterase: Negative / RBC:  / WBC    Sq Epi:  / Non Sq Epi:  / Bacteria:       Sodium, Random Urine: <20.0 mmoL/L ( @ 08:00)  Osmolality, Random Urine: 54 mos/kg ( @ 08:00)    RADIOLOGY & ADDITIONAL STUDIES:  < from: CT Head No Cont (21 @ 10:52) >  No acute intracranial pathology.    < end of copied text >

## 2021-07-12 NOTE — PROGRESS NOTE ADULT - ASSESSMENT
Severe hyponatremia improving Na ~130    Urine chemistries c/w poor solute intake w/ relatively excessive free water intake    No IVF's    Continue PO water restrictions    Encourage intake of solid food, namely protein  Will sign off  OP f/u at

## 2021-07-12 NOTE — ED ADULT NURSE REASSESSMENT NOTE - NS ED NURSE REASSESS COMMENT FT1
Report given to RN @ [facility]. [place] department of Health Adult Care facility Mental health evaluation and ALP medical evaluation forms completed by inpatient psych.

## 2021-07-12 NOTE — DISCHARGE NOTE NURSING/CASE MANAGEMENT/SOCIAL WORK - PATIENT PORTAL LINK FT
You can access the FollowMyHealth Patient Portal offered by [facility] by registering at the following website: [email or web address]. By joining [facility]’s FollowMyHealth portal, you will also be able to view your health information using other applications (apps) compatible with our system.

## 2021-07-12 NOTE — DISCHARGE NOTE NURSING/CASE MANAGEMENT/SOCIAL WORK - NSDCPEEMAIL_GEN_ALL_CORE
[facility] for Tobacco Control email [email or web address]

## 2021-07-12 NOTE — ED ADULT TRIAGE NOTE - CHIEF COMPLAINT QUOTE
Patient discharged from the floor. As per [facility] patient needs to be cleared by Psych to return to home.

## 2021-07-12 NOTE — PROGRESS NOTE ADULT - SUBJECTIVE AND OBJECTIVE BOX
Patient is comfortable in chair       T(F): 97.1 (07-12-21 @ 04:35), Max: 97.5 (07-11-21 @ 21:25)  HR: 73 (07-12-21 @ 04:35)  BP: 108/61 (07-12-21 @ 04:35)  RR: 18 (07-12-21 @ 04:35)      PHYSICAL EXAM:  GENERAL: NAD  HEAD:  Atraumatic, Normocephalic  EYES: EOMI, PERRLA, conjunctiva and sclera clear  NERVOUS SYSTEM:  Alert & Oriented X3, no focal deficits   CHEST/LUNG: Clear to percussion bilaterally; No rales, rhonchi, wheezing, or rubs  HEART: Regular rate and rhythm; No murmurs, rubs, or gallops  ABDOMEN: Soft, Nontender, Nondistended; Bowel sounds present  EXTREMITIES:  2+ Peripheral Pulses, No clubbing, cyanosis, or edema    LABS  07-12    129<L>  |  92<L>  |  15  ----------------------------<  81  3.9   |  24  |  0.7    Ca    9.4      12 Jul 2021 07:15    TPro  6.2  /  Alb  3.7  /  TBili  0.4  /  DBili  x   /  AST  15  /  ALT  <5  /  AlkPhos  59  07-12                          12.1   8.68  )-----------( 152      ( 12 Jul 2021 07:15 )             34.5       RADIOLOGY  < from: CT Head No Cont (07.08.21 @ 10:52) >  IMPRESSION:    No acute intracranial pathology.    < end of copied text >    MEDICATIONS  (STANDING):  amLODIPine   Tablet 10 milliGRAM(s) Oral at bedtime  aspirin enteric coated 81 milliGRAM(s) Oral daily  busPIRone 15 milliGRAM(s) Oral two times a day  chlorhexidine 4% Liquid 1 Application(s) Topical every 12 hours  diVALproex  milliGRAM(s) Oral two times a day  enoxaparin Injectable 40 milliGRAM(s) SubCutaneous daily  haloperidol     Tablet 5 milliGRAM(s) Oral at bedtime  labetalol 100 milliGRAM(s) Oral two times a day  losartan 100 milliGRAM(s) Oral daily  nicotine -  14 mG/24Hr(s) Patch 1 patch Transdermal daily  nystatin Powder 1 Application(s) Topical two times a day    MEDICATIONS  (PRN):  hydrOXYzine hydrochloride 25 milliGRAM(s) Oral at bedtime PRN sleep

## 2021-07-12 NOTE — DISCHARGE NOTE PROVIDER - CARE PROVIDERS DIRECT ADDRESSES
[email or web address]

## 2021-07-12 NOTE — ED PROVIDER NOTE - CLINICAL SUMMARY MEDICAL DECISION MAKING FREE TEXT BOX
60yM  59 yo M with PMH of schizophrenia, HLD, depression, HTN  discharged today  from 7/8-7/12  back to  [facility]  for  SI and  hyponatremia 11 . dcd with Na 129  cleared by psych  On arrival to [facility] - patient sent  back  to  [facility] as  paperwork from  psych  while on medicine floor  was not filled out  - Pt  sent to ED- no SI  no somatic complaints  Psych  came to ED and paperwork filled out -  sent back to [facility]

## 2021-07-12 NOTE — ED PROVIDER NOTE - OBJECTIVE STATEMENT
60 year old male past medical history of schizophrenia d/c from hospital 2 hours ago returns to emergency room due to lack of psych clearance paperwork being filled out. patient has no complaints.

## 2021-07-12 NOTE — CHART NOTE - NSCHARTNOTEFT_GEN_A_CORE
Called by medicine attending, patient cleared for discharge by psych.  Na+ level stable, stable for discharge from medical perspective.  c/w current Rx.  No medication changes from last psych note, recommended possible d/c of buspirone for which patient can FU outpatient.  Rx for labetalol sent.  Other medications from home.  Papers for [facility] filled out and faxed by AMADO
Called in by RN   Patient is complaining of suicidal ideations and hallucinations     Saw patient at bedside   Patient admits to new onset of auditory hallucinations   Patient also admits to suicidal ideations, plan is to cut his left wrist of     Discussed with MD   Plan: Request a follow up from psych  Anticipate for dc tomorrow
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

## 2021-07-12 NOTE — CHART NOTE - NSCHARTNOTEFT_GEN_A_CORE
Pt is a 59yo m with h/o schizoaffective d/o admitted to ICU due to hyponatremia and elevated troponin, presents with disorganised t/p. Pt was seen by psychiatry and signed off. Today, pt was discharged from the hospital, back to [facility] and sent back to ED as he did not have the mental health evaluation forms filled out.     Pt was seen, he remains psychiatrically stable. Reports he feels "fine.' Offered no new complaints. States he has been compliant with medications, finds it beneficial. Reports he has an outpatient psychiatrist and agrees with outpatient follow up.     Mental health evaluation form filled out for pt to return back to his residence.

## 2021-07-12 NOTE — ED ADULT NURSE NOTE - CHIEF COMPLAINT QUOTE
Patient discharged from the floor. As per [facility] patient needs to be cleared by Psych to return to home.

## 2021-07-12 NOTE — PROGRESS NOTE ADULT - REASON FOR ADMISSION
hyponatremia
severe hyponatremia,

## 2021-07-12 NOTE — DISCHARGE NOTE PROVIDER - HOSPITAL COURSE
60M w/PMHx of schizophrenia, HLD, depression, HTN presented to ED for suicidal ideation and found to have multiple electrolyte abnormalities and admitted to ICU for further management.  Patient's Na+ replete d slowly w/D5W, given DDAVP, started on fluid restriction and seen by Renal.  Patient symptomatically improved and discharge Na+ 129.  Patient's b/p elevated, continued on home medications and added Labetalol 100mg BID with improvement.  Patient initially presented for SI, followed by psych and cleared for discharge back to adult home.  At this time advise 1.5-2L daily fluid restriction.

## 2021-07-12 NOTE — DISCHARGE NOTE PROVIDER - NSDCCPCAREPLAN_GEN_ALL_CORE_FT
PRINCIPAL DISCHARGE DIAGNOSIS  Diagnosis: Hyponatremia  Assessment and Plan of Treatment: 2/2 psychogenic polydipsia.  Fluid restrict to 1.5-2L/day.  Check Na+ level next week      SECONDARY DISCHARGE DIAGNOSES  Diagnosis: Suicidal ideations  Assessment and Plan of Treatment: cleared by psych    Diagnosis: Troponin level elevated  Assessment and Plan of Treatment: resolved

## 2021-07-12 NOTE — DISCHARGE NOTE PROVIDER - CARE PROVIDER_API CALL
Wilmer Glez)  [address] [place] [address], [place] [address]  Phone: [phone]  Fax: [phone]  Follow Up Time:

## 2021-07-12 NOTE — DISCHARGE NOTE PROVIDER - NSDCMRMEDTOKEN_GEN_ALL_CORE_FT
amLODIPine 10 mg oral tablet: 1 tab(s) orally once a day (at bedtime) until md stops  Aspirin Enteric Coated 81 mg oral delayed release tablet: 1 tab(s) orally once a day until md stops  busPIRone 15 mg oral tablet: 1 tab(s) orally 2 times a day until md stops  divalproex sodium 500 mg oral delayed release tablet: 1 tab(s) orally 2 times a day until md stops  docusate sodium 100 mg oral capsule: 1 cap(s) orally 3 times a day until md stops  haloperidol 5 mg oral tablet: 1 tab(s) orally once a day (at bedtime) until md stops  labetalol 100 mg oral tablet: 1 tab(s) orally 2 times a day  losartan 100 mg oral tablet: 1 tab(s) orally once a day until directed by md to stop

## 2021-07-12 NOTE — PROGRESS NOTE ADULT - PROVIDER SPECIALTY LIST ADULT
Pulmonology
Nephrology
Critical Care
Critical Care
Hospitalist
Nephrology
Nephrology
Hospitalist

## 2021-07-12 NOTE — ED PROVIDER NOTE - PATIENT PORTAL LINK FT
You can access the FollowMyHealth Patient Portal offered by [facility] by registering at the following website: [email or web address]. By joining [facility]’s FollowMyHealth portal, you will also be able to view your health information using other applications (apps) compatible with our system.

## 2021-07-13 ENCOUNTER — INPATIENT (INPATIENT)
Facility: HOSPITAL | Age: 61
LOS: 2 days | Discharge: HOME | End: 2021-07-16
Attending: PSYCHIATRY & NEUROLOGY | Admitting: PSYCHIATRY & NEUROLOGY
Payer: MEDICARE

## 2021-07-13 VITALS
RESPIRATION RATE: 18 BRPM | WEIGHT: 179.9 LBS | DIASTOLIC BLOOD PRESSURE: 47 MMHG | OXYGEN SATURATION: 100 % | HEIGHT: 68 IN | HEART RATE: 58 BPM | TEMPERATURE: 96 F | SYSTOLIC BLOOD PRESSURE: 77 MMHG

## 2021-07-13 LAB
ALBUMIN SERPL ELPH-MCNC: 3.8 G/DL — SIGNIFICANT CHANGE UP (ref 3.5–5.2)
ALP SERPL-CCNC: 55 U/L — SIGNIFICANT CHANGE UP (ref 30–115)
ALT FLD-CCNC: 6 U/L — SIGNIFICANT CHANGE UP (ref 0–41)
ANION GAP SERPL CALC-SCNC: 11 MMOL/L — SIGNIFICANT CHANGE UP (ref 7–14)
APTT BLD: 28.9 SEC — SIGNIFICANT CHANGE UP (ref 27–39.2)
AST SERPL-CCNC: 13 U/L — SIGNIFICANT CHANGE UP (ref 0–41)
BASE EXCESS BLDV CALC-SCNC: 0.2 MMOL/L — SIGNIFICANT CHANGE UP (ref -2–2)
BASOPHILS # BLD AUTO: 0.06 K/UL — SIGNIFICANT CHANGE UP (ref 0–0.2)
BASOPHILS NFR BLD AUTO: 0.6 % — SIGNIFICANT CHANGE UP (ref 0–1)
BILIRUB SERPL-MCNC: 0.4 MG/DL — SIGNIFICANT CHANGE UP (ref 0.2–1.2)
BUN SERPL-MCNC: 19 MG/DL — SIGNIFICANT CHANGE UP (ref 10–20)
CA-I SERPL-SCNC: 1.22 MMOL/L — SIGNIFICANT CHANGE UP (ref 1.12–1.3)
CALCIUM SERPL-MCNC: 8.9 MG/DL — SIGNIFICANT CHANGE UP (ref 8.5–10.1)
CHLORIDE SERPL-SCNC: 95 MMOL/L — LOW (ref 98–110)
CHOLEST SERPL-MCNC: 156 MG/DL — SIGNIFICANT CHANGE UP
CO2 SERPL-SCNC: 22 MMOL/L — SIGNIFICANT CHANGE UP (ref 17–32)
CREAT SERPL-MCNC: 1.1 MG/DL — SIGNIFICANT CHANGE UP (ref 0.7–1.5)
EOSINOPHIL # BLD AUTO: 0.32 K/UL — SIGNIFICANT CHANGE UP (ref 0–0.7)
EOSINOPHIL NFR BLD AUTO: 3 % — SIGNIFICANT CHANGE UP (ref 0–8)
GAS PNL BLDV: 129 MMOL/L — LOW (ref 136–145)
GAS PNL BLDV: SIGNIFICANT CHANGE UP
GLUCOSE SERPL-MCNC: 125 MG/DL — HIGH (ref 70–99)
HCO3 BLDV-SCNC: 26 MMOL/L — SIGNIFICANT CHANGE UP (ref 22–29)
HCT VFR BLD CALC: 31.4 % — LOW (ref 42–52)
HCT VFR BLDA CALC: 35.3 % — SIGNIFICANT CHANGE UP (ref 34–44)
HDLC SERPL-MCNC: 43 MG/DL — SIGNIFICANT CHANGE UP
HGB BLD CALC-MCNC: 11.5 G/DL — LOW (ref 14–18)
HGB BLD-MCNC: 10.9 G/DL — LOW (ref 14–18)
IMM GRANULOCYTES NFR BLD AUTO: 0.7 % — HIGH (ref 0.1–0.3)
INR BLD: 1.16 RATIO — SIGNIFICANT CHANGE UP (ref 0.65–1.3)
LACTATE BLDV-MCNC: 1.5 MMOL/L — SIGNIFICANT CHANGE UP (ref 0.5–1.6)
LACTATE SERPL-SCNC: 1.5 MMOL/L — SIGNIFICANT CHANGE UP (ref 0.7–2)
LIPID PNL WITH DIRECT LDL SERPL: 95 MG/DL — SIGNIFICANT CHANGE UP
LYMPHOCYTES # BLD AUTO: 2.58 K/UL — SIGNIFICANT CHANGE UP (ref 1.2–3.4)
LYMPHOCYTES # BLD AUTO: 23.9 % — SIGNIFICANT CHANGE UP (ref 20.5–51.1)
MCHC RBC-ENTMCNC: 27.9 PG — SIGNIFICANT CHANGE UP (ref 27–31)
MCHC RBC-ENTMCNC: 34.7 G/DL — SIGNIFICANT CHANGE UP (ref 32–37)
MCV RBC AUTO: 80.3 FL — SIGNIFICANT CHANGE UP (ref 80–94)
MONOCYTES # BLD AUTO: 1.01 K/UL — HIGH (ref 0.1–0.6)
MONOCYTES NFR BLD AUTO: 9.4 % — HIGH (ref 1.7–9.3)
NEUTROPHILS # BLD AUTO: 6.75 K/UL — HIGH (ref 1.4–6.5)
NEUTROPHILS NFR BLD AUTO: 62.4 % — SIGNIFICANT CHANGE UP (ref 42.2–75.2)
NON HDL CHOLESTEROL: 113 MG/DL — SIGNIFICANT CHANGE UP
NRBC # BLD: 0 /100 WBCS — SIGNIFICANT CHANGE UP (ref 0–0)
PCO2 BLDV: 47 MMHG — SIGNIFICANT CHANGE UP (ref 41–51)
PH BLDV: 7.36 — SIGNIFICANT CHANGE UP (ref 7.26–7.43)
PLATELET # BLD AUTO: 189 K/UL — SIGNIFICANT CHANGE UP (ref 130–400)
PO2 BLDV: 23 MMHG — SIGNIFICANT CHANGE UP (ref 20–40)
POTASSIUM BLDV-SCNC: 3.3 MMOL/L — SIGNIFICANT CHANGE UP (ref 3.3–5.6)
POTASSIUM SERPL-MCNC: 3.4 MMOL/L — LOW (ref 3.5–5)
POTASSIUM SERPL-SCNC: 3.4 MMOL/L — LOW (ref 3.5–5)
PROT SERPL-MCNC: 5.9 G/DL — LOW (ref 6–8)
PROTHROM AB SERPL-ACNC: 13.3 SEC — HIGH (ref 9.95–12.87)
RBC # BLD: 3.91 M/UL — LOW (ref 4.7–6.1)
RBC # FLD: 14.4 % — SIGNIFICANT CHANGE UP (ref 11.5–14.5)
SAO2 % BLDV: 39 % — SIGNIFICANT CHANGE UP
SODIUM SERPL-SCNC: 128 MMOL/L — LOW (ref 135–146)
TRIGL SERPL-MCNC: 95 MG/DL — SIGNIFICANT CHANGE UP
TROPONIN T SERPL-MCNC: <0.01 NG/ML — SIGNIFICANT CHANGE UP
WBC # BLD: 10.8 K/UL — SIGNIFICANT CHANGE UP (ref 4.8–10.8)
WBC # FLD AUTO: 10.8 K/UL — SIGNIFICANT CHANGE UP (ref 4.8–10.8)

## 2021-07-13 PROCEDURE — 93010 ELECTROCARDIOGRAM REPORT: CPT

## 2021-07-13 PROCEDURE — 0042T: CPT

## 2021-07-13 PROCEDURE — 70496 CT ANGIOGRAPHY HEAD: CPT | Mod: 26,MA

## 2021-07-13 PROCEDURE — 99285 EMERGENCY DEPT VISIT HI MDM: CPT

## 2021-07-13 PROCEDURE — 70498 CT ANGIOGRAPHY NECK: CPT | Mod: 26,MA

## 2021-07-13 PROCEDURE — 70450 CT HEAD/BRAIN W/O DYE: CPT | Mod: 26,59,MA

## 2021-07-13 RX ORDER — SODIUM CHLORIDE 9 MG/ML
1000 INJECTION, SOLUTION INTRAVENOUS ONCE
Refills: 0 | Status: COMPLETED | OUTPATIENT
Start: 2021-07-13 | End: 2021-07-13

## 2021-07-13 RX ADMIN — SODIUM CHLORIDE 1000 MILLILITER(S): 9 INJECTION, SOLUTION INTRAVENOUS at 23:42

## 2021-07-13 NOTE — ED ADULT NURSE NOTE - NSIMPLEMENTINTERV_GEN_ALL_ED
Implemented All Fall with Harm Risk Interventions:  [place] to call system. Call bell, personal items and telephone within reach. Instruct patient to call for assistance. Room bathroom lighting operational. Non-slip footwear when patient is off stretcher. Physically safe environment: no spills, clutter or unnecessary equipment. Stretcher in lowest position, wheels locked, appropriate side rails in place. Provide visual cue, wrist band, yellow gown, etc. Monitor gait and stability. Monitor for mental status changes and reorient to person, place, and time. Review medications for side effects contributing to fall risk. Reinforce activity limits and safety measures with patient and family. Provide visual clues: red socks.

## 2021-07-13 NOTE — ED ADULT TRIAGE NOTE - CHIEF COMPLAINT QUOTE
pt biba from assisted living residence for "sitting on toilet unresponsive"; found by EMS sweating and unconscious and hypotensive

## 2021-07-13 NOTE — ED ADULT NURSE NOTE - OBJECTIVE STATEMENT
BIBA from [facility] with reports of hypotension and unresponsiveness after showering this evening. patient received IVF en route to ER with mild improvement. patient noted to have minimal slurred speech upon arrival - unknown if baseline.

## 2021-07-14 LAB
A1C WITH ESTIMATED AVERAGE GLUCOSE RESULT: 5.4 % — SIGNIFICANT CHANGE UP (ref 4–5.6)
ALBUMIN SERPL ELPH-MCNC: 4.1 G/DL — SIGNIFICANT CHANGE UP (ref 3.5–5.2)
ALP SERPL-CCNC: 58 U/L — SIGNIFICANT CHANGE UP (ref 30–115)
ALT FLD-CCNC: 8 U/L — SIGNIFICANT CHANGE UP (ref 0–41)
ANION GAP SERPL CALC-SCNC: 12 MMOL/L — SIGNIFICANT CHANGE UP (ref 7–14)
APPEARANCE UR: CLEAR — SIGNIFICANT CHANGE UP
AST SERPL-CCNC: 25 U/L — SIGNIFICANT CHANGE UP (ref 0–41)
BASOPHILS # BLD AUTO: 0.07 K/UL — SIGNIFICANT CHANGE UP (ref 0–0.2)
BASOPHILS NFR BLD AUTO: 0.6 % — SIGNIFICANT CHANGE UP (ref 0–1)
BILIRUB SERPL-MCNC: 0.4 MG/DL — SIGNIFICANT CHANGE UP (ref 0.2–1.2)
BILIRUB UR-MCNC: NEGATIVE — SIGNIFICANT CHANGE UP
BUN SERPL-MCNC: 19 MG/DL — SIGNIFICANT CHANGE UP (ref 10–20)
CALCIUM SERPL-MCNC: 9.4 MG/DL — SIGNIFICANT CHANGE UP (ref 8.5–10.1)
CHLORIDE SERPL-SCNC: 95 MMOL/L — LOW (ref 98–110)
CHOLEST SERPL-MCNC: 166 MG/DL — SIGNIFICANT CHANGE UP
CO2 SERPL-SCNC: 23 MMOL/L — SIGNIFICANT CHANGE UP (ref 17–32)
COLOR SPEC: SIGNIFICANT CHANGE UP
CREAT SERPL-MCNC: 0.8 MG/DL — SIGNIFICANT CHANGE UP (ref 0.7–1.5)
DIFF PNL FLD: SIGNIFICANT CHANGE UP
EOSINOPHIL # BLD AUTO: 0.12 K/UL — SIGNIFICANT CHANGE UP (ref 0–0.7)
EOSINOPHIL NFR BLD AUTO: 1.1 % — SIGNIFICANT CHANGE UP (ref 0–8)
ESTIMATED AVERAGE GLUCOSE: 108 MG/DL — SIGNIFICANT CHANGE UP (ref 68–114)
GLUCOSE SERPL-MCNC: 92 MG/DL — SIGNIFICANT CHANGE UP (ref 70–99)
GLUCOSE UR QL: NEGATIVE — SIGNIFICANT CHANGE UP
HCT VFR BLD CALC: 34.6 % — LOW (ref 42–52)
HDLC SERPL-MCNC: 42 MG/DL — SIGNIFICANT CHANGE UP
HGB BLD-MCNC: 11.9 G/DL — LOW (ref 14–18)
IMM GRANULOCYTES NFR BLD AUTO: 0.6 % — HIGH (ref 0.1–0.3)
KETONES UR-MCNC: NEGATIVE — SIGNIFICANT CHANGE UP
LEUKOCYTE ESTERASE UR-ACNC: NEGATIVE — SIGNIFICANT CHANGE UP
LIPID PNL WITH DIRECT LDL SERPL: 99 MG/DL — SIGNIFICANT CHANGE UP
LYMPHOCYTES # BLD AUTO: 2.88 K/UL — SIGNIFICANT CHANGE UP (ref 1.2–3.4)
LYMPHOCYTES # BLD AUTO: 26.5 % — SIGNIFICANT CHANGE UP (ref 20.5–51.1)
MAGNESIUM SERPL-MCNC: 2 MG/DL — SIGNIFICANT CHANGE UP (ref 1.8–2.4)
MCHC RBC-ENTMCNC: 27.9 PG — SIGNIFICANT CHANGE UP (ref 27–31)
MCHC RBC-ENTMCNC: 34.4 G/DL — SIGNIFICANT CHANGE UP (ref 32–37)
MCV RBC AUTO: 81.2 FL — SIGNIFICANT CHANGE UP (ref 80–94)
MONOCYTES # BLD AUTO: 0.89 K/UL — HIGH (ref 0.1–0.6)
MONOCYTES NFR BLD AUTO: 8.2 % — SIGNIFICANT CHANGE UP (ref 1.7–9.3)
NEUTROPHILS # BLD AUTO: 6.84 K/UL — HIGH (ref 1.4–6.5)
NEUTROPHILS NFR BLD AUTO: 63 % — SIGNIFICANT CHANGE UP (ref 42.2–75.2)
NITRITE UR-MCNC: NEGATIVE — SIGNIFICANT CHANGE UP
NON HDL CHOLESTEROL: 124 MG/DL — SIGNIFICANT CHANGE UP
NRBC # BLD: 0 /100 WBCS — SIGNIFICANT CHANGE UP (ref 0–0)
PH UR: 8 — SIGNIFICANT CHANGE UP (ref 5–8)
PLATELET # BLD AUTO: 178 K/UL — SIGNIFICANT CHANGE UP (ref 130–400)
POTASSIUM SERPL-MCNC: 3.4 MMOL/L — LOW (ref 3.5–5)
POTASSIUM SERPL-SCNC: 3.4 MMOL/L — LOW (ref 3.5–5)
PROT SERPL-MCNC: 6.2 G/DL — SIGNIFICANT CHANGE UP (ref 6–8)
PROT UR-MCNC: SIGNIFICANT CHANGE UP
RBC # BLD: 4.26 M/UL — LOW (ref 4.7–6.1)
RBC # FLD: 14.4 % — SIGNIFICANT CHANGE UP (ref 11.5–14.5)
SARS-COV-2 RNA SPEC QL NAA+PROBE: SIGNIFICANT CHANGE UP
SODIUM SERPL-SCNC: 130 MMOL/L — LOW (ref 135–146)
SP GR SPEC: 1.03 — HIGH (ref 1.01–1.03)
TRIGL SERPL-MCNC: 111 MG/DL — SIGNIFICANT CHANGE UP
UROBILINOGEN FLD QL: SIGNIFICANT CHANGE UP
WBC # BLD: 10.87 K/UL — HIGH (ref 4.8–10.8)
WBC # FLD AUTO: 10.87 K/UL — HIGH (ref 4.8–10.8)

## 2021-07-14 PROCEDURE — 70548 MR ANGIOGRAPHY NECK W/DYE: CPT | Mod: 26

## 2021-07-14 PROCEDURE — 71045 X-RAY EXAM CHEST 1 VIEW: CPT | Mod: 26

## 2021-07-14 PROCEDURE — 70551 MRI BRAIN STEM W/O DYE: CPT | Mod: 26

## 2021-07-14 PROCEDURE — 70544 MR ANGIOGRAPHY HEAD W/O DYE: CPT | Mod: 26,59

## 2021-07-14 PROCEDURE — 99283 EMERGENCY DEPT VISIT LOW MDM: CPT

## 2021-07-14 RX ORDER — ASPIRIN/CALCIUM CARB/MAGNESIUM 324 MG
243 TABLET ORAL ONCE
Refills: 0 | Status: COMPLETED | OUTPATIENT
Start: 2021-07-14 | End: 2021-07-14

## 2021-07-14 RX ORDER — ASPIRIN/CALCIUM CARB/MAGNESIUM 324 MG
81 TABLET ORAL DAILY
Refills: 0 | Status: DISCONTINUED | OUTPATIENT
Start: 2021-07-14 | End: 2021-07-16

## 2021-07-14 RX ORDER — CLOPIDOGREL BISULFATE 75 MG/1
75 TABLET, FILM COATED ORAL DAILY
Refills: 0 | Status: DISCONTINUED | OUTPATIENT
Start: 2021-07-15 | End: 2021-07-16

## 2021-07-14 RX ORDER — DIVALPROEX SODIUM 500 MG/1
500 TABLET, DELAYED RELEASE ORAL
Refills: 0 | Status: DISCONTINUED | OUTPATIENT
Start: 2021-07-14 | End: 2021-07-16

## 2021-07-14 RX ORDER — CLOPIDOGREL BISULFATE 75 MG/1
75 TABLET, FILM COATED ORAL DAILY
Refills: 0 | Status: DISCONTINUED | OUTPATIENT
Start: 2021-07-14 | End: 2021-07-14

## 2021-07-14 RX ORDER — CHLORHEXIDINE GLUCONATE 213 G/1000ML
1 SOLUTION TOPICAL ONCE
Refills: 0 | Status: COMPLETED | OUTPATIENT
Start: 2021-07-14 | End: 2021-07-15

## 2021-07-14 RX ORDER — AMLODIPINE BESYLATE 2.5 MG/1
10 TABLET ORAL AT BEDTIME
Refills: 0 | Status: DISCONTINUED | OUTPATIENT
Start: 2021-07-14 | End: 2021-07-16

## 2021-07-14 RX ORDER — ATORVASTATIN CALCIUM 80 MG/1
80 TABLET, FILM COATED ORAL AT BEDTIME
Refills: 0 | Status: DISCONTINUED | OUTPATIENT
Start: 2021-07-14 | End: 2021-07-16

## 2021-07-14 RX ORDER — HALOPERIDOL DECANOATE 100 MG/ML
5 INJECTION INTRAMUSCULAR AT BEDTIME
Refills: 0 | Status: DISCONTINUED | OUTPATIENT
Start: 2021-07-14 | End: 2021-07-16

## 2021-07-14 RX ORDER — CLOPIDOGREL BISULFATE 75 MG/1
300 TABLET, FILM COATED ORAL ONCE
Refills: 0 | Status: COMPLETED | OUTPATIENT
Start: 2021-07-14 | End: 2021-07-14

## 2021-07-14 RX ADMIN — Medication 15 MILLIGRAM(S): at 07:03

## 2021-07-14 RX ADMIN — AMLODIPINE BESYLATE 10 MILLIGRAM(S): 2.5 TABLET ORAL at 23:14

## 2021-07-14 RX ADMIN — CLOPIDOGREL BISULFATE 300 MILLIGRAM(S): 75 TABLET, FILM COATED ORAL at 00:48

## 2021-07-14 RX ADMIN — DIVALPROEX SODIUM 500 MILLIGRAM(S): 500 TABLET, DELAYED RELEASE ORAL at 07:03

## 2021-07-14 RX ADMIN — HALOPERIDOL DECANOATE 5 MILLIGRAM(S): 100 INJECTION INTRAMUSCULAR at 23:14

## 2021-07-14 RX ADMIN — Medication 243 MILLIGRAM(S): at 02:02

## 2021-07-14 RX ADMIN — DIVALPROEX SODIUM 500 MILLIGRAM(S): 500 TABLET, DELAYED RELEASE ORAL at 17:47

## 2021-07-14 RX ADMIN — Medication 15 MILLIGRAM(S): at 17:47

## 2021-07-14 RX ADMIN — SODIUM CHLORIDE 1000 MILLILITER(S): 9 INJECTION, SOLUTION INTRAVENOUS at 01:10

## 2021-07-14 RX ADMIN — ATORVASTATIN CALCIUM 80 MILLIGRAM(S): 80 TABLET, FILM COATED ORAL at 23:14

## 2021-07-14 RX ADMIN — Medication 81 MILLIGRAM(S): at 11:13

## 2021-07-14 NOTE — OCCUPATIONAL THERAPY INITIAL EVALUATION ADULT - ADL RETRAINING, OT EVAL
Spine appears normal, range of motion is not limited, no muscle or joint tenderness Pt will perform UB dressing with setup and LB dressing with min assist by discharge to work towards PLOF.

## 2021-07-14 NOTE — OCCUPATIONAL THERAPY INITIAL EVALUATION ADULT - GENERAL OBSERVATIONS, REHAB EVAL
OT eval: 7:35-8:10 Pt received semi [place] on yu in ED hallway +BP cuff, tele, pulse ox monitor. Pt sleepy but agreeable to OT eval.

## 2021-07-14 NOTE — ED PROVIDER NOTE - NS ED ROS FT
Review of Systems  Constitutional:  No fever, chills.  Eyes:  No visual changes, eye pain, or discharge.  ENMT:  No hearing changes, pain, or discharge. No nasal congestion, discharge, or bleeding. No throat pain, swelling, or difficulty swallowing.  Cardiac:  No chest pain, palpitations, syncope.  Respiratory:  No dyspnea, cough. No hemoptysis.  GI:  No nausea, vomiting, diarrhea, or abdominal pain.   :  No dysuria, hematuria, frequency, or burning.   MS:  No back pain.  Skin:  No skin rash, pruritis, jaundice, or lesions.  Neuro:  No headache, dizziness, loss of sensation, or focal weakness.  (+) slurred speech

## 2021-07-14 NOTE — ED PROVIDER NOTE - OBJECTIVE STATEMENT
59 yo M with PMHx of HTN, HLD, hyponatremia 2/2 psychogenic polydipsia, and schizophrenia presents to the ED BIB EMS for evaluation of AMS. According to EMS pt was found sitting on the toilet and was found by staff confused and slurring his speech. EMS state on their arrival he was diaphoretic and hypotensive but responded to fluid bolus. Pt denies fever, chills, nausea, vomiting, abdominal pain, diarrhea, headache, dizziness, weakness, chest pain, SOB, back pain, LOC, trauma.

## 2021-07-14 NOTE — OCCUPATIONAL THERAPY INITIAL EVALUATION ADULT - TRANSFER TRAINING, PT EVAL
Pt will transfer from bed to chair and toilet with close supervision by discharge to work towards PLOF

## 2021-07-14 NOTE — OCCUPATIONAL THERAPY INITIAL EVALUATION ADULT - ADDITIONAL COMMENTS
Pt presents as poor historian, pt stated he lives with his nieces in a . As per chart review pt lives in an assisted living ([facility]). Pt later verbalized he lives in a "building residence" when cued. PLOF unknown at this time.

## 2021-07-14 NOTE — H&P ADULT - NSHPLABSRESULTS_GEN_ALL_CORE
LABS:                        10.9   10.80 )-----------( 189      ( 2021 23:04 )             31.4         128<L>  |  95<L>  |  19  ----------------------------<  125<H>  3.4<L>   |  22  |  1.1    Ca    8.9      2021 23:04    TPro  5.9<L>  /  Alb  3.8  /  TBili  0.4  /  DBili  x   /  AST  13  /  ALT  6   /  AlkPhos  55      PT/INR - ( 2021 23:04 )   PT: 13.30 sec;   INR: 1.16 ratio         PTT - ( 2021 23:04 )  PTT:28.9 sec  Urinalysis Basic - ( 2021 00:30 )    Color: Light Yellow / Appearance: Clear / S.031 / pH: x  Gluc: x / Ketone: Negative  / Bili: Negative / Urobili: <2 mg/dL   Blood: x / Protein: Trace / Nitrite: Negative   Leuk Esterase: Negative / RBC: x / WBC x   Sq Epi: x / Non Sq Epi: x / Bacteria: x        Lactate, Blood: 1.5 mmol/L (21 @ 23:04)  Troponin T, Serum: <0.01 ng/mL (21 @ 23:04)      CARDIAC MARKERS ( 2021 23:04 )  x     / <0.01 ng/mL / x     / x     / x        RADIOLOGY:    < from: CT Angio Neck w/ IV Cont (21 @ 23:32) >    IMPRESSION:    1. Vascular web versus focal intimal flap in the V3 segment of the right vertebral artery.  2. Focal 50% stenosis and a superior M2 branch of the right middle cerebral artery.  3. Scattered small areas of possible ischemia in the posterior fossa.    < from: CT Perfusion w/ Maps w/ IV Cont (21 @ 23:24) >    IMPRESSION:    1. Vascular web versus focal intimal flap in the V3 segment of the right vertebral artery.  2. Focal 50% stenosis and a superior M2 branch of the right middle cerebral artery.  3. Scattered small areas of possible ischemia in the posterior fossa.    < from: CT Brain Stroke Protocol (21 @ 23:07) >    IMPRESSION:    No CT evidence of acute territorial infarct or other acute intracranial pathology.

## 2021-07-14 NOTE — OCCUPATIONAL THERAPY INITIAL EVALUATION ADULT - PERTINENT HX OF CURRENT PROBLEM, REHAB EVAL
Pt is a right hand dominant, 60y man with PMHx of htn, Depression, HDL, Schizophrenia biba from assisted living residence for "sitting on toilet unresponsive." Pt found to be hypotensive upon EMS arrival. Pt room mate states an hour prior pt was at baseline. Code stroke activated upon arrival to ED. NIHSS 1. Recently admitted and discharged from hospital for hyponatremia.

## 2021-07-14 NOTE — CONSULT NOTE ADULT - ATTENDING COMMENTS
Patient seen and examined and agree with above except as noted.  Patients history, notes, labs, imaging, vitals and meds reviewed personally.  Patient found on toilet unresponsive and found to be hypotensive.  In ED NIHSS 1 for slurred speech and on my evaluation NIHSS 2 for slurred speech and right facial.  There was intermittent past pointing on the right arm as well.  Found ot have a dissection possibly in the right vertberal artery and was loaded with aspirin and plavix.    Plan as above (Admit to stroke unit for q4 neurocheck and vitals; MRI brain and MRA neck w/ARIANNA and MR with T1 fat saturation)

## 2021-07-14 NOTE — ED PROVIDER NOTE - ATTENDING CONTRIBUTION TO CARE
61 yo M pmh of HTN, hyponatremia, schizophrenia presents with new confusion. Last known normal 1 hour prior to arrival, seen by roommate. patient was in the bathroom when he was foun to be confused. EMS gave fluids with some improvement in mentation but still A&x1 on arrival. Stroke code called. no deficits noted. + slurring of speech. no chest pain, no shortness of breath, no palpiations. no fevers or recent illness.     CONSTITUTIONAL: Well-developed; well-nourished; in no acute distress.   SKIN: warm, dry  HEAD: Normocephalic; atraumatic.  EYES: PERRL, EOMI, no conjunctival erythema  ENT: No nasal discharge; airway clear.  NECK: Supple; non tender.  CARD: S1, S2 normal;  Regular rate and rhythm.   RESP: No wheezes, rales or rhonchi.  ABD: soft non tender, non distended, no rebound or guarding  EXT: Normal ROM.  5/5 strength in all 4 extremities   LYMPH: No acute cervical adenopathy.  NEURO: NIH 1, slurred speech, no focal deficits.   PSYCH: Cooperative, appropriate.

## 2021-07-14 NOTE — OCCUPATIONAL THERAPY INITIAL EVALUATION ADULT - LEVEL OF CONSCIOUSNESS, OT EVAL
Pt observed continually falling asleep throughout OT eval which presented with some confusion during questioning./alert

## 2021-07-14 NOTE — OCCUPATIONAL THERAPY INITIAL EVALUATION ADULT - LIGHT TOUCH SENSATION, RUE, REHAB EVAL
inconsistent responses observed from pt 2/2 decreased arousal, pt frequently falling asleep. presents WNL though at times presented with mild-mod impairment.

## 2021-07-14 NOTE — OCCUPATIONAL THERAPY INITIAL EVALUATION ADULT - IMPAIRMENTS CONTRIBUTING IMPAIRED BED MOBILITY, REHAB EVAL
Pt observed shivering throughout eval which impaired pt balance. Pt reported feeling cold./impaired balance

## 2021-07-14 NOTE — H&P ADULT - ASSESSMENT
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  - Needs MRI to characterize the vertebral lesion  - Neuro recs appreciated, for now:   --- Load Aspirin 243 mg (to complete 325 mg load)  then 81 mg    --- Loading dose Plavix 300 mg and 75 mg daily    ---Lipitor 80 mg HS   ---2D echocardiogram   --- Lipid profile, A1c   ---Permissive HTN up to 180 given possible dissection   --- Speech and swallow eval    #Hyponatremia secondary to polydipsia  - Improved since last admission, pharmacology chart review performed all possible meds that can contribute to SIADH d/c'd  - Strict fluid monitoring  - daily CMP    #Hypotension secondary to possible polypharmacy  #Possible pre-syncopal episode in nursing home  - Labetalol 100 BID added to home dose norvasc on last discharge  - SBP on admission ranging from   - Hold home norvasc for now  - Monitor BP Q routine  - Titrate BP medications to avoid hypotension  - Err towards elevated blood pressure (SBP up to 180 permitted per neuro)    -->permissive hypertension for possible vertebral artery dissection    #Misc  - DVT Prophylaxis: Lovenox subQ  - GI Prophylaxis: not indicated  - Diet: DASH/TLC  - Activity: PT/OT eval  - IV Fluids: hold for now, avoid ns, d5w recommended per nephro last admission  - Code Status: full code        - MRI Head w/o  - MRA head and MRA neck w/contrast T1 fat sat dissection protocol     Pt is a 61yo m, residing in [facility], with HTN, HLD, recent admission for hyponatremia secondary to psychogenic polydipsia, long h/o mental illness, brought in once again from assisted living facility ([facility]) this time for AMS admitted to medicine for possible vertebral dissection currently pending further inpatient neuro workup    #Acute onset slurred speech secondary to 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  - Needs MRI to characterize the vertebral lesion  - Neuro recs appreciated, for now:   --- Load Aspirin 243 mg (to complete 325 mg load)  then 81 mg    --- Loading dose Plavix 300 mg and 75 mg daily    ---Lipitor 80 mg HS   ---2D echocardiogram   --- Lipid profile, A1c   ---Permissive HTN up to 180 given possible dissection   --- Speech and swallow eval    #Hyponatremia secondary to polydipsia  - Improved since last admission, pharmacology chart review performed all possible meds that can contribute to SIADH d/c'd  - Strict fluid monitoring  - daily CMP    #Hypotension secondary to possible polypharmacy  #Possible pre-syncopal episode in nursing home  - Labetalol 100 BID added to home dose norvasc on last discharge  - SBP on admission ranging from   - Hold home norvasc for now  - Monitor BP Q routine  - Titrate BP medications to avoid hypotension  - Err towards elevated blood pressure (SBP up to 180 permitted per neuro)    -->permissive hypertension for possible vertebral artery dissection    #Misc  - DVT Prophylaxis: Lovenox subQ  - GI Prophylaxis: not indicated  - Diet: DASH/TLC  - Activity: PT/OT eval  - IV Fluids: hold for now, avoid ns, d5w recommended per nephro last admission  - Code Status: full code               Pt is a 59yo m, residing in [facility], with HTN, HLD, recent admission for hyponatremia secondary to psychogenic polydipsia, long h/o mental illness, brought in once again from assisted living facility ([facility]) this time for AMS admitted to medicine for possible vertebral dissection currently pending further inpatient neuro workup    #Acute onset slurred speech secondary to 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  - Needs MRI to characterize the vertebral lesion  - Neuro recs appreciated, for now:   --- Load Aspirin 243 mg (to complete 325 mg load)  then 81 mg    --- Loading dose Plavix 300 mg and 75 mg daily    ---Lipitor 80 mg HS   ---2D echocardiogram   --- Lipid profile, A1c   ---Permissive HTN up to 180 given possible dissection   --- Speech and swallow eval    #Hyponatremia secondary to polydipsia  - Improved since last admission, pharmacology chart review performed all possible meds that can contribute to SIADH d/c'd  - Strict fluid monitoring  - daily CMP    #Hypotension secondary to possible polypharmacy  #Possible pre-syncopal episode in nursing home  - Labetalol 100 BID added to home dose norvasc on last discharge  - SBP on admission ranging from   - Holding new labetalol for now  - Monitor BP Q routine  - Titrate BP medications to avoid hypotension  - Err towards elevated blood pressure (SBP up to 180 permitted per neuro)    -->permissive hypertension for possible vertebral artery dissection    #Schizoaffective disorder  - Seen by inpatient psych service last admission, medically optimized  - c/w buspirone, haloperidol  - consider psych consult for acute worsening psychiatric symptoms over admission (currently normal affect)    #Misc  - DVT Prophylaxis: Lovenox subQ  - GI Prophylaxis: not indicated  - Diet: DASH/TLC  - Activity: PT/OT eval  - IV Fluids: hold for now, avoid ns, d5w recommended per nephro last admission  - Code Status: full code

## 2021-07-14 NOTE — H&P ADULT - NSHPPHYSICALEXAM_GEN_ALL_CORE
CONSTITUTIONAL: Well-developed; well-nourished; in no acute distress.  SKIN: warm, dry  HEAD: Normocephalic; atraumatic.  EYES: PERRL, EOMI, no conjunctival erythema  ENT: No nasal discharge; airway clear.  NECK: Supple; non tender.  CARD: S1, S2 normal;  Regular rate and rhythm.   RESP: No wheezes, rales or rhonchi.  ABD: soft non tender, non distended, no rebound or guarding  EXT: Normal ROM.  5/5 strength in all 4 extremities   LYMPH: No acute cervical adenopathy.  NEURO: NIH 1, slurred speech, no focal deficits.   PSYCH: Cooperative, appropriate.

## 2021-07-14 NOTE — ED ADULT NURSE REASSESSMENT NOTE - NS ED NURSE REASSESS COMMENT FT1
Patient repeat BP hypotensive. MAR notified @ 0860 of reading. 1L LR IVF ordered. NIH shows patient with persistent slurred speech, drowsy and disorientated to place but corrected himself when asked a second time.   Repeat NIH assessment shows patient at baseline neurological status with persistent slurred speech and drowsiness.

## 2021-07-14 NOTE — OCCUPATIONAL THERAPY INITIAL EVALUATION ADULT - COORDINATION ASSESSED, REHAB EVAL
RUE presents with min ataxia compaired to LUE. Pt observed shivering in BUE and stated he was cold./finger to nose

## 2021-07-14 NOTE — ED PROVIDER NOTE - PHYSICAL EXAMINATION
VITAL SIGNS: I have reviewed nursing notes and confirm.  CONSTITUTIONAL: Well-developed; well-nourished; in no acute distress.  SKIN: Skin exam is warm and dry, no acute rash.  HEAD: Normocephalic; atraumatic.  EYES: PERRL, EOM intact; conjunctiva and sclera clear.  ENT: No nasal discharge; airway clear.   CARD: S1, S2 normal; no murmurs, gallops, or rubs. Regular rate and rhythm.  RESP: No wheezes, rales or rhonchi.   ABD: Normal bowel sounds; soft; non-distended; non-tender; No rebound or guarding.   EXT: Normal ROM.   NEURO: (+) slurred speech. No other focal deficits.

## 2021-07-14 NOTE — H&P ADULT - HISTORY OF PRESENT ILLNESS
Pt is a 61yo m, residing in [facility], with HTN, HLD, recent admission for hyponatremia secondary to psychogenic polydipsia, long h/o mental illness, prior IPP ( his last admission was to our IPP from 8/8/19 to 9/6/19, prior to that was admitted also to our IPP from 7/17/19 to 8/5/19 ), brought in once again from assisted living facility ([facility]) this time for AMS. Per NH staff, patient was in bathroom for a long time, when staff entered the room found him sitting on the toilet confused and slurring speech. Ptn's roommate who was with him 45 miinutes before says he was at his baseline at that time (AOx3), so staff brought him to ED for evaluation    In the ED /72  HR 81  RR 18  saturating well on room air. Code stroke was called, CT head negative but CTA right vertebral artery malformation, could not rule out vertebral dissection. Ptn AOx3 but with slurred speech. Labs remarkable for Na 128, stably improved from 111 on last admission 7/08. Ptn admitted to medicine for further neuro workup of acute slurring speech secondary to possible acute vertebral dissection         Pt is a 61yo m, residing in [facility], with HTN, HLD, recent admission for hyponatremia secondary to psychogenic polydipsia, long h/o mental illness, prior IPP ( his last admission was here at [facility] IPP from 8/8/19 to 9/6/19, prior to that was admitted also to our IPP from 7/17/19 to 8/5/19 ), brought in once again from assisted living facility ([facility]) this time for AMS. Per NH staff, patient was in bathroom for a long time, when staff entered the room found him sitting on the toilet confused and slurring speech. Ptn's roommate who was with him 45 minutes before says he was at his baseline at that time (AOx3), so staff brought him to ED for evaluation    In the ED /72  HR 81  RR 18  saturating well on room air. Code stroke was called, CT head negative but CTA right vertebral artery malformation, could not rule out vertebral dissection. Ptn AOx3 but with slurred speech. Labs remarkable for Na 128, stably improved from 111 on last admission 7/08. Ptn admitted to medicine for further neuro workup of acute slurring speech secondary to possible acute vertebral dissection

## 2021-07-14 NOTE — ED PROVIDER NOTE - CLINICAL SUMMARY MEDICAL DECISION MAKING FREE TEXT BOX
Patient presents with new confusion, + slurred speech. Stroke code activated. patient is not a tpa candidate. ct and perfusion done. Admitted to stroke unit for further management.

## 2021-07-14 NOTE — CONSULT NOTE ADULT - ASSESSMENT
Assessment  60y Male with PMHx of htn, Depression, HDL, Schizophrenia biba from assisted living residence for "sitting on toilet unresponsive." Pt found to be hypotensive upon EMS arrival. Pt room mate states an hour prior pt was at baseline. Code stroke activated upon arrival to ED. NIHSS 1. CT Head negative. CTA and CT perfusion completed.     Plan:    -c/w aspirin 81 mg   -Loading dose Plavix 300 mg and 75 mg daily   -Speech and swallow eval  -PT/OT   -MRI Head w/o      Admit to Stroke Unit     Liliya Paz NP  ex [phone]  Assessment  60y Male with PMHx of htn, Depression, HDL, Schizophrenia biba from assisted living residence for "sitting on toilet unresponsive." Pt found to be hypotensive upon EMS arrival. Pt room mate states an hour prior pt was at baseline. Code stroke activated upon arrival to ED. NIHSS 1. CT Head negative. CTA and CT perfusion completed.     - Acute stroke  - Possible R VA dissection (V3 segment)      Plan:    - Aspirin 243 mg (to complete 325 mg load)  then 81 mg   - Loading dose Plavix 300 mg and 75 mg daily   - Lipitor 80 mg HS  - 2D echocardiogram  - Lipid profile, A1c  - Permissive HTN up to 180 given possible dissection  - Speech and swallow eval  - PT/OT   - MRI Head w/o  - MRA head and MRA neck w/contrast T1 fat sat dissection protocol      Admit to Stroke Unit under Neurology service    Liliya Paz NP  ex [id number]

## 2021-07-14 NOTE — CONSULT NOTE ADULT - SUBJECTIVE AND OBJECTIVE BOX
Neurology Consult Note     Last known well time/Time of onset of symptoms: one hour prior to arrival approx. 21:30 7/13    HPI: 60y Male with PMHx of htn, Depression, HDL, Schizophrenia biba from assisted living residence for "sitting on toilet unresponsive." Pt found to be hypotensive upon EMS arrival. Pt room mate states an hour prior pt was at baseline. Code stroke activated upon arrival to ED. NIHSS 1. Recently admitted and discharged from hospital for hyponatremia.     PAST MEDICAL & SURGICAL HISTORY:    HTN (hypertension)    Depression    Hyperlipemia    Schizophrenia    No significant past surgical history    Allergies    tetanus toxoid (Swelling)      Vital Signs Last 24 Hrs  T(C): 35.4 (13 Jul 2021 22:45), Max: 35.4 (13 Jul 2021 22:45)  T(F): 95.7 (13 Jul 2021 22:45), Max: 95.7 (13 Jul 2021 22:45)  HR: 57 (13 Jul 2021 23:47) (57 - 63)  BP: 91/45 (13 Jul 2021 23:47) (77/47 - 94/44)  BP(mean): 61 (13 Jul 2021 23:47) (61 - 63)  RR: 18 (13 Jul 2021 23:47) (18 - 18)  SpO2: 99% (13 Jul 2021 23:47) (95% - 100%)      Fingerstick Blood Glucose: CAPILLARY BLOOD GLUCOSE      POCT Blood Glucose.: 157 mg/dL (13 Jul 2021 22:52)    LABS:                        10.9   10.80 )-----------( 189      ( 13 Jul 2021 23:04 )             31.4     07-13    128<L>  |  95<L>  |  19  ----------------------------<  125<H>  3.4<L>   |  22  |  1.1    Ca    8.9      13 Jul 2021 23:04    TPro  5.9<L>  /  Alb  3.8  /  TBili  0.4  /  DBili  x   /  AST  13  /  ALT  6   /  AlkPhos  55  07-13    PT/INR - ( 13 Jul 2021 23:04 )   PT: 13.30 sec;   INR: 1.16 ratio         PTT - ( 13 Jul 2021 23:04 )  PTT:28.9 sec  CARDIAC MARKERS ( 13 Jul 2021 23:04 )  x     / <0.01 ng/mL / x     / x     / x          NIH Stroke Scale:   NIH Stroke Scale: LOC	(0) Alert; keenly responsive  NIH Stroke Scale: LOC Question	(0) Answers both questions correctly  NIH Stroke Scale: LOC Command	(0) Performs both tasks correctly  NIH Stroke Scale: Gaze	(0) Normal  NIH Stroke Scale: Visual	(0) No visual loss  NIH Stroke Scale: Facial	(0) Normal symmetrical movements  NIH Stroke Scale: Arm Left	(0) No drift; limb holds 90 (or 45) degrees for full 10 secs  NIH Stroke Scale: Arm Right	(0) No drift; limb holds 90 (or 45) degrees for full 10 secs  NIH Stroke Scale: Leg Left	(0) No drift; leg holds 30 degree position for full 5 secs  NIH Stroke Scale: Leg Right	(0) No drift; leg holds 30 degree position for full 5 secs  NIH Stroke Scale: Ataxia	(0) Absent  NIH Stroke Scale: Sensory	(0) Normal; no sensory loss  NIH Stroke Scale: Language	(0) No aphasia; normal  NIH Stroke Scale: Dysarthria	(1) Mild-to-moderate dysarthria; patient slurs at least some words and, at worst, can be understood with some difficulty  NIH Stroke Scale: Extinct Inattention	(0) No abnormality  NIH Stroke Scale: Total	1      RADIOLOGY & ADDITIONAL STUDIES:    < from: CT Brain Stroke Protocol (07.13.21 @ 23:07) >    IMPRESSION:    No CT evidence of acute territorial infarct or other acute intracranial pathology.    < end of copied text >

## 2021-07-15 ENCOUNTER — TRANSCRIPTION ENCOUNTER (OUTPATIENT)
Age: 61
End: 2021-07-15

## 2021-07-15 LAB
COVID-19 SPIKE DOMAIN AB INTERP: POSITIVE
COVID-19 SPIKE DOMAIN ANTIBODY RESULT: 5.09 U/ML — HIGH
SARS-COV-2 IGG+IGM SERPL QL IA: 5.09 U/ML — HIGH
SARS-COV-2 IGG+IGM SERPL QL IA: POSITIVE

## 2021-07-15 PROCEDURE — 99232 SBSQ HOSP IP/OBS MODERATE 35: CPT

## 2021-07-15 RX ORDER — ASPIRIN/CALCIUM CARB/MAGNESIUM 324 MG
1 TABLET ORAL
Qty: 0 | Refills: 0 | DISCHARGE
Start: 2021-07-15

## 2021-07-15 RX ORDER — ATORVASTATIN CALCIUM 80 MG/1
1 TABLET, FILM COATED ORAL
Qty: 30 | Refills: 0
Start: 2021-07-15 | End: 2021-08-13

## 2021-07-15 RX ORDER — AMLODIPINE BESYLATE 2.5 MG/1
1 TABLET ORAL
Qty: 0 | Refills: 0 | DISCHARGE
Start: 2021-07-15

## 2021-07-15 RX ORDER — ENOXAPARIN SODIUM 100 MG/ML
40 INJECTION SUBCUTANEOUS DAILY
Refills: 0 | Status: DISCONTINUED | OUTPATIENT
Start: 2021-07-15 | End: 2021-07-16

## 2021-07-15 RX ORDER — HALOPERIDOL DECANOATE 100 MG/ML
1 INJECTION INTRAMUSCULAR
Qty: 0 | Refills: 0 | DISCHARGE
Start: 2021-07-15

## 2021-07-15 RX ORDER — DIVALPROEX SODIUM 500 MG/1
1 TABLET, DELAYED RELEASE ORAL
Qty: 0 | Refills: 0 | DISCHARGE
Start: 2021-07-15

## 2021-07-15 RX ADMIN — AMLODIPINE BESYLATE 10 MILLIGRAM(S): 2.5 TABLET ORAL at 21:03

## 2021-07-15 RX ADMIN — CHLORHEXIDINE GLUCONATE 1 APPLICATION(S): 213 SOLUTION TOPICAL at 05:37

## 2021-07-15 RX ADMIN — Medication 15 MILLIGRAM(S): at 17:20

## 2021-07-15 RX ADMIN — HALOPERIDOL DECANOATE 5 MILLIGRAM(S): 100 INJECTION INTRAMUSCULAR at 21:03

## 2021-07-15 RX ADMIN — ATORVASTATIN CALCIUM 80 MILLIGRAM(S): 80 TABLET, FILM COATED ORAL at 21:03

## 2021-07-15 RX ADMIN — Medication 15 MILLIGRAM(S): at 05:37

## 2021-07-15 RX ADMIN — DIVALPROEX SODIUM 500 MILLIGRAM(S): 500 TABLET, DELAYED RELEASE ORAL at 17:20

## 2021-07-15 RX ADMIN — DIVALPROEX SODIUM 500 MILLIGRAM(S): 500 TABLET, DELAYED RELEASE ORAL at 05:36

## 2021-07-15 RX ADMIN — ENOXAPARIN SODIUM 40 MILLIGRAM(S): 100 INJECTION SUBCUTANEOUS at 17:20

## 2021-07-15 RX ADMIN — CLOPIDOGREL BISULFATE 75 MILLIGRAM(S): 75 TABLET, FILM COATED ORAL at 11:15

## 2021-07-15 RX ADMIN — Medication 81 MILLIGRAM(S): at 11:15

## 2021-07-15 NOTE — DISCHARGE NOTE PROVIDER - CARE PROVIDERS DIRECT ADDRESSES
[email or web address]

## 2021-07-15 NOTE — DISCHARGE NOTE PROVIDER - NSDCMRMEDTOKEN_GEN_ALL_CORE_FT
amLODIPine 10 mg oral tablet: 1 tab(s) orally once a day (at bedtime) until md stops  Aspirin Enteric Coated 81 mg oral delayed release tablet: 1 tab(s) orally once a day until md stops  busPIRone 15 mg oral tablet: 1 tab(s) orally 2 times a day until md stops  divalproex sodium 500 mg oral delayed release tablet: 1 tab(s) orally 2 times a day until md stops  docusate sodium 100 mg oral capsule: 1 cap(s) orally 3 times a day until md stops  haloperidol 5 mg oral tablet: 1 tab(s) orally once a day (at bedtime) until md stops  labetalol 100 mg oral tablet: 1 tab(s) orally 2 times a day  losartan 100 mg oral tablet: 1 tab(s) orally once a day until directed by md to stop   amLODIPine 10 mg oral tablet: 1 tab(s) orally once a day (at bedtime) until md stops  aspirin 81 mg oral delayed release tablet: 1 tab(s) orally once a day  busPIRone 15 mg oral tablet: 1 tab(s) orally 2 times a day until md stops  divalproex sodium 500 mg oral delayed release tablet: 1 tab(s) orally 2 times a day until md stops  docusate sodium 100 mg oral capsule: 1 cap(s) orally 3 times a day until md stops  haloperidol 5 mg oral tablet: 1 tab(s) orally once a day (at bedtime) until md stops  labetalol 100 mg oral tablet: 1 tab(s) orally 2 times a day  losartan 100 mg oral tablet: 1 tab(s) orally once a day until directed by md to stop   amLODIPine 10 mg oral tablet: 1 tab(s) orally once a day (at bedtime)  aspirin 81 mg oral delayed release tablet: 1 tab(s) orally once a day  busPIRone 15 mg oral tablet: 1 tab(s) orally 2 times a day  divalproex sodium 500 mg oral delayed release tablet: 1 tab(s) orally 2 times a day  haloperidol 5 mg oral tablet: 1 tab(s) orally once a day (at bedtime)  Lipitor 40 mg oral tablet: 1 tab(s) orally once a day

## 2021-07-15 NOTE — DISCHARGE NOTE PROVIDER - NSDCFUADDAPPT_GEN_ALL_CORE_FT
Please follow up outpatient with Dr Bush ,  at the following adress: [address]   Please follow up outpatient with Dr Bush ,  at the following adress: [address]

## 2021-07-15 NOTE — DISCHARGE NOTE PROVIDER - PROVIDER TOKENS
PROVIDER:[TOKEN:[[id number]],FOLLOWUP:[2 weeks]],PROVIDER:[TOKEN:[[id number]],FOLLOWUP:[1 month]]

## 2021-07-15 NOTE — DISCHARGE NOTE PROVIDER - NSDCCPCAREPLAN_GEN_ALL_CORE_FT
PRINCIPAL DISCHARGE DIAGNOSIS  Diagnosis: Altered mental status  Assessment and Plan of Treatment: You were admitted to the hospital for change in mental status.  your sodium level was low ,   CT and MRI imaging were negative and ruled out stroke ,  Please take all medications as perscribed .  Please follow up with outpatient care provider.

## 2021-07-15 NOTE — DISCHARGE NOTE PROVIDER - CARE PROVIDER_API CALL
Gregg Raza)  EEGEpilepsy; Neurology  [address], [address]  [place] [address]  Phone: [phone]  Fax: [phone]  Follow Up Time: 2 weeks    Wilmer Glez)  [address] [place] [address], [address]  [place] [address]  Phone: [phone]  Fax: [phone]  Follow Up Time: 1 month

## 2021-07-15 NOTE — DISCHARGE NOTE PROVIDER - HOSPITAL COURSE
Pt is a 59yo m, residing in [facility], with HTN, HLD, recent admission for hyponatremia secondary to psychogenic polydipsia, long h/o mental illness, prior IPP ( his last admission was here at [facility] IPP from 8/8/19 to 9/6/19, prior to that was admitted also to our IPP from 7/17/19 to 8/5/19 ), brought in once again from assisted living facility ([place]) this time for AMS.   Per NH staff, patient was in bathroom for a long time, when staff entered the room found him sitting on the toilet confused and slurring speech. Ptn's roommate who was with him 45 minutes before says he was at his baseline at that time (AOx3), so staff brought him to ED for evaluation    In the ED /72  HR 81  RR 18  saturating well on room air. Code stroke was called, CT head negative but CTA right vertebral artery malformation, could not rule out vertebral dissection. Ptn AOx3 but with slurred speech. Labs remarkable for Na 128, stably improved from 111 on last admission 7/08. Ptn admitted to medicine for further neuro workup of acute slurring speech secondary to possible acute vertebral dissection.    < from: MR Head No Cont (07.14.21 @ 22:37) >   MRI -There is no evidence of acute intracranial pathology. No evidence of acute infarct, intracranial hemorrhage or mass effect.    The patient is alert ,lying comfortable in bed ,denies any pain ,dizziness ,vision changes or weakness.  follow up outpatient.          Pt is a 59yo m, residing in [facility], with HTN, HLD, recent admission for hyponatremia secondary to psychogenic polydipsia, long h/o mental illness, prior IPP ( his last admission was here at [facility] IPP from 8/8/19 to 9/6/19, prior to that was admitted also to our IPP from 7/17/19 to 8/5/19 ), brought in once again from assisted living facility ([facility]) this time for AMS.   Per NH staff, patient was in bathroom for a long time, when staff entered the room found him sitting on the toilet confused and slurring speech. Ptn's roommate who was with him 45 minutes before says he was at his baseline at that time (AOx3), so staff brought him to ED for evaluation    In the ED /72  HR 81  RR 18  saturating well on room air. Code stroke was called, CT head negative but CTA right vertebral artery malformation, could not rule out vertebral dissection. Ptn AOx3 but with slurred speech. Labs remarkable for Na 128, stably improved from 111 on last admission 7/08. Ptn admitted to medicine for further neuro workup of acute slurring speech secondary to possible acute vertebral dissection.    < from: MR Head No Cont (07.14.21 @ 22:37) >   MRI -There is no evidence of acute intracranial pathology. No evidence of acute infarct, intracranial hemorrhage or mass effect.    The patient is alert ,lying comfortable in bed ,denies any pain ,dizziness ,vision changes or weakness.  follow up outpatient .          Pt is a 61yo m, residing in [facility], with HTN, HLD, recent admission for hyponatremia secondary to psychogenic polydipsia, long h/o mental illness, prior IPP ( his last admission was here at [facility] IPP from 8/8/19 to 9/6/19, prior to that was admitted also to our IPP from 7/17/19 to 8/5/19 ), brought in once again from assisted living facility ([place]) this time for AMS.   Per NH staff, patient was in bathroom for a long time, when staff entered the room found him sitting on the toilet confused and slurring speech. Ptn's roommate who was with him 45 minutes before says he was at his baseline at that time (AOx3), so staff brought him to ED for evaluation    In the ED /72  HR 81  RR 18  saturating well on room air. Code stroke was called, CT head negative but CTA right vertebral artery malformation, could not rule out vertebral dissection. Ptn AOx3 but with slurred speech. Labs remarkable for Na 128, stably improved from 111 on last admission 7/08. Ptn admitted to medicine for further neuro workup of acute slurring speech secondary to possible acute vertebral dissection.    < from: MR Head No Cont (07.14.21 @ 22:37) >   MRI -There is no evidence of acute intracranial pathology. No evidence of acute infarct, intracranial hemorrhage or mass effect.    The patient is alert ,lying comfortable in bed ,denies any pain ,dizziness ,vision changes or weakness.  follow up outpatient with Dr Bush ,in 2 weeks .  Follow up with PCP for parotid lesion .  Pt is a 61yo m, residing in [facility], with HTN, HLD, recent admission for hyponatremia secondary to psychogenic polydipsia, long h/o mental illness, prior IPP ( his last admission was here at [facility] IPP from 8/8/19 to 9/6/19, prior to that was admitted also to our IPP from 7/17/19 to 8/5/19 ), brought in once again from assisted living facility ([facility]) this time for AMS.   Per NH staff, patient was in bathroom for a long time, when staff entered the room found him sitting on the toilet confused and slurring speech. Ptn's roommate who was with him 45 minutes before says he was at his baseline at that time (AOx3), so staff brought him to ED for evaluation    In the ED /72  HR 81  RR 18  saturating well on room air. Code stroke was called, CT head negative but CTA right vertebral artery malformation, could not rule out vertebral dissection. Ptn AOx3 but with slurred speech. Labs remarkable for Na 128, stably improved from 111 on last admission 7/08. Ptn admitted to medicine for further neuro workup of acute slurring speech secondary to possible acute vertebral dissection.    < from: MR Head No Cont (07.14.21 @ 22:37) >   MRI -There is no evidence of acute intracranial pathology. No evidence of acute infarct, intracranial hemorrhage or mass effect.  MRA shows mild L-ICA stenosis; right M2 superior division narrowing;  right V3 abnormality not well appreciated.    The patient is alert ,lying comfortable in bed ,denies any pain ,dizziness ,vision changes or weakness.  Follow up outpatient with Dr Pearce ,in 2 weeks .  Follow up with PCP for parotid lesion .    Attending attestation: Patient seen this morning and results of MRI MRA reviewed with patient.  No symptoms currently and while speech is mildly dysarthric patient states this is how his speech sounds.  NO ther focal findings and FTN , HTS normal.  Will have him continue to follow in Stroke clinic in 1-2 weeks and would have him referred to Neuroendovascular clinic for possible DSA to better assess vertebral artery.

## 2021-07-15 NOTE — PHYSICAL THERAPY INITIAL EVALUATION ADULT - PATIENT PROFILE REVIEW, REHAB EVAL
Chart reviewed. Pt c bed rest activity orders. Please adjust activity orders accordingly, then PT can evaluate and treat./yes
Chart reviewed/yes

## 2021-07-15 NOTE — PHYSICAL THERAPY INITIAL EVALUATION ADULT - PERTINENT HX OF CURRENT PROBLEM, REHAB EVAL
pt is a HPI: 60y Male with PMHx of htn, Depression, HDL, Schizophrenia biba from assisted living residence for "sitting on toilet unresponsive." Pt found to be hypotensive upon EMS arrival. Pt room mate states an hour prior pt was at baseline. Code stroke activated upon arrival to ED. NIHSS 1. Recently admitted and discharged from hospital for hyponatremia. pt is a HPI: 60y Male with PMHx of htn, Depression, HDL, Schizophrenia  from assisted living residence for "sitting on toilet unresponsive." Pt found to be hypotensive upon EMS arrival. Pt room mate states an hour prior pt was at baseline. Code stroke activated upon arrival to ED. NIHSS 1. Recently admitted and discharged from hospital for hyponatremia.

## 2021-07-15 NOTE — PHYSICAL THERAPY INITIAL EVALUATION ADULT - GENERAL OBSERVATIONS, REHAB EVAL
9:15-9:40 Pt encountered supine in bed in NAD agreeable for PT. + tele. BP in supine: 161/77, seated: 125/77, standin/92 . No c/o of lighthededness or dizziness.

## 2021-07-16 ENCOUNTER — TRANSCRIPTION ENCOUNTER (OUTPATIENT)
Age: 61
End: 2021-07-16

## 2021-07-16 VITALS — HEART RATE: 87 BPM | SYSTOLIC BLOOD PRESSURE: 133 MMHG | DIASTOLIC BLOOD PRESSURE: 79 MMHG

## 2021-07-16 LAB
ALBUMIN SERPL ELPH-MCNC: 4.1 G/DL — SIGNIFICANT CHANGE UP (ref 3.5–5.2)
ALP SERPL-CCNC: 62 U/L — SIGNIFICANT CHANGE UP (ref 30–115)
ALT FLD-CCNC: 13 U/L — SIGNIFICANT CHANGE UP (ref 0–41)
ANION GAP SERPL CALC-SCNC: 12 MMOL/L — SIGNIFICANT CHANGE UP (ref 7–14)
AST SERPL-CCNC: 33 U/L — SIGNIFICANT CHANGE UP (ref 0–41)
BILIRUB SERPL-MCNC: 0.4 MG/DL — SIGNIFICANT CHANGE UP (ref 0.2–1.2)
BUN SERPL-MCNC: 15 MG/DL — SIGNIFICANT CHANGE UP (ref 10–20)
CALCIUM SERPL-MCNC: 9.6 MG/DL — SIGNIFICANT CHANGE UP (ref 8.5–10.1)
CHLORIDE SERPL-SCNC: 98 MMOL/L — SIGNIFICANT CHANGE UP (ref 98–110)
CO2 SERPL-SCNC: 27 MMOL/L — SIGNIFICANT CHANGE UP (ref 17–32)
CREAT SERPL-MCNC: 0.7 MG/DL — SIGNIFICANT CHANGE UP (ref 0.7–1.5)
GLUCOSE SERPL-MCNC: 101 MG/DL — HIGH (ref 70–99)
HCT VFR BLD CALC: 35.5 % — LOW (ref 42–52)
HGB BLD-MCNC: 12.8 G/DL — LOW (ref 14–18)
MAGNESIUM SERPL-MCNC: 2.1 MG/DL — SIGNIFICANT CHANGE UP (ref 1.8–2.4)
MCHC RBC-ENTMCNC: 28.7 PG — SIGNIFICANT CHANGE UP (ref 27–31)
MCHC RBC-ENTMCNC: 36.1 G/DL — SIGNIFICANT CHANGE UP (ref 32–37)
MCV RBC AUTO: 79.6 FL — LOW (ref 80–94)
NRBC # BLD: 0 /100 WBCS — SIGNIFICANT CHANGE UP (ref 0–0)
PLATELET # BLD AUTO: 194 K/UL — SIGNIFICANT CHANGE UP (ref 130–400)
POTASSIUM SERPL-MCNC: 3.1 MMOL/L — LOW (ref 3.5–5)
POTASSIUM SERPL-SCNC: 3.1 MMOL/L — LOW (ref 3.5–5)
PROT SERPL-MCNC: 6.5 G/DL — SIGNIFICANT CHANGE UP (ref 6–8)
RBC # BLD: 4.46 M/UL — LOW (ref 4.7–6.1)
RBC # FLD: 13.9 % — SIGNIFICANT CHANGE UP (ref 11.5–14.5)
SODIUM SERPL-SCNC: 137 MMOL/L — SIGNIFICANT CHANGE UP (ref 135–146)
WBC # BLD: 7.25 K/UL — SIGNIFICANT CHANGE UP (ref 4.8–10.8)
WBC # FLD AUTO: 7.25 K/UL — SIGNIFICANT CHANGE UP (ref 4.8–10.8)

## 2021-07-16 PROCEDURE — 93306 TTE W/DOPPLER COMPLETE: CPT | Mod: 26

## 2021-07-16 PROCEDURE — 99232 SBSQ HOSP IP/OBS MODERATE 35: CPT

## 2021-07-16 RX ADMIN — Medication 15 MILLIGRAM(S): at 05:55

## 2021-07-16 RX ADMIN — DIVALPROEX SODIUM 500 MILLIGRAM(S): 500 TABLET, DELAYED RELEASE ORAL at 17:04

## 2021-07-16 RX ADMIN — Medication 81 MILLIGRAM(S): at 11:49

## 2021-07-16 RX ADMIN — CLOPIDOGREL BISULFATE 75 MILLIGRAM(S): 75 TABLET, FILM COATED ORAL at 11:49

## 2021-07-16 RX ADMIN — DIVALPROEX SODIUM 500 MILLIGRAM(S): 500 TABLET, DELAYED RELEASE ORAL at 05:55

## 2021-07-16 RX ADMIN — Medication 15 MILLIGRAM(S): at 17:04

## 2021-07-16 RX ADMIN — ENOXAPARIN SODIUM 40 MILLIGRAM(S): 100 INJECTION SUBCUTANEOUS at 11:49

## 2021-07-16 NOTE — SWALLOW BEDSIDE ASSESSMENT ADULT - SLP PERTINENT HISTORY OF CURRENT PROBLEM
Pt is a 61yo m, residing in [facility], with HTN, HLD, recent admission for hyponatremia secondary to psychogenic polydipsia, long h/o mental illness, prior IPP ( his last admission was here at [facility]. currently admitted for AMS. Code stroke was called, CT head negative but CTA right vertebral artery malformation, could not rule out vertebral dissection
Pt is a 61yo m, residing in [facility], with HTN, HLD, recent admission for hyponatremia secondary to psychogenic polydipsia, long h/o mental illness, prior IPP ( his last admission was here at [facility]. currently admitted for AMS. Code stroke was called, CT head negative but CTA right vertebral artery malformation, could not rule out vertebral dissection

## 2021-07-16 NOTE — DISCHARGE NOTE NURSING/CASE MANAGEMENT/SOCIAL WORK - PATIENT PORTAL LINK FT
You can access the FollowMyHealth Patient Portal offered by [facility] by registering at the following website: [email or web address]. By joining [email or web address] FollowMyHealth portal, you will also be able to view your health information using other applications (apps) compatible with our system.

## 2021-07-16 NOTE — CONSULT NOTE ADULT - SUBJECTIVE AND OBJECTIVE BOX
HPI:  Pt is a 61yo m, residing in [facility], with HTN, HLD, recent admission for hyponatremia secondary to psychogenic polydipsia, long h/o mental illness, prior IPP ( his last admission was here at [facility] IPP from 8/8/19 to 9/6/19, prior to that was admitted also to our IPP from 7/17/19 to 8/5/19 ), brought in once again from assisted living facility ([facility]) this time for AMS. Per NH staff, patient was in bathroom for a long time, when staff entered the room found him sitting on the toilet confused and slurring speech. Ptn's roommate who was with him 45 minutes before says he was at his baseline at that time (AOx3), so staff brought him to ED for evaluation    In the ED /72  HR 81  RR 18  saturating well on room air. Code stroke was called, CT head negative but CTA right vertebral artery malformation, could not rule out vertebral dissection. Ptn AOx3 but with slurred speech. Labs remarkable for Na 128, stably improved from 111 on last admission 7/08. Ptn admitted to medicine for further neuro workup of acute slurring speech secondary to possible acute vertebral dissection. MRI of brain showed no acute infarct         (14 Jul 2021 02:05)      PAST MEDICAL & SURGICAL HISTORY:  HTN (hypertension)    Depression    Hyperlipemia    Schizophrenia    No significant past surgical history        Hospital Course:    TODAY'S SUBJECTIVE & REVIEW OF SYMPTOMS:     Constitutional WNL   Cardio WNL   Resp WNL   GI WNL  Heme WNL  Endo WNL  Skin WNL  MSK WNL  Neuro WNL  Cognitive confusion   Psych WNL      MEDICATIONS  (STANDING):  amLODIPine   Tablet 10 milliGRAM(s) Oral at bedtime  aspirin enteric coated 81 milliGRAM(s) Oral daily  atorvastatin 80 milliGRAM(s) Oral at bedtime  busPIRone 15 milliGRAM(s) Oral two times a day  clopidogrel Tablet 75 milliGRAM(s) Oral daily  diVALproex  milliGRAM(s) Oral two times a day  enoxaparin Injectable 40 milliGRAM(s) SubCutaneous daily  haloperidol     Tablet 5 milliGRAM(s) Oral at bedtime    MEDICATIONS  (PRN):      FAMILY HISTORY:      Allergies    tetanus toxoid (Swelling)    Intolerances        SOCIAL HISTORY:    [  ] Etoh  [  ] Smoking  [  ] Substance abuse     Home Environment:  [   ] Home Alone  [   ] Lives with Family  [   ] Home Health Aid    Dwelling:  [   ] Apartment  [   ] Private House  [   ] Adult Home  [ x ] Skilled Nursing Facility      [   ] Short Term  [   ] Long Term  [   ] Stairs       Elevator [   ]    FUNCTIONAL STATUS PTA: (Check all that apply)  Ambulation: [   x ]Independent    [   ] Dependent     [   ] Non-Ambulatory  Assistive Device: [   ] SA Cane  [   ]  Q Cane  [   ] Walker  [   ]  Wheelchair  ADL : [  x ] Independent  [    ]  Dependent       Vital Signs Last 24 Hrs  T(C): 36.1 (16 Jul 2021 05:22), Max: 36.9 (15 Jul 2021 20:25)  T(F): 97 (16 Jul 2021 05:22), Max: 98.5 (15 Jul 2021 20:25)  HR: 77 (16 Jul 2021 05:22) (74 - 95)  BP: 146/68 (16 Jul 2021 05:22) (105/72 - 146/68)  BP(mean): 84 (15 Jul 2021 10:02) (84 - 84)  RR: 18 (16 Jul 2021 05:22) (18 - 18)  SpO2: --      PHYSICAL EXAM: Awake & Alert  GENERAL: NAD  HEAD:  Normocephalic  CHEST/LUNG: Clear   HEART: S1S2+  ABDOMEN: Soft, Nontender  EXTREMITIES:  no calf tenderness    NERVOUS SYSTEM:  Cranial Nerves 2-12 intact [   ] Abnormal  [  x ]dysarthria  ROM: WFL all extremities [x   ]  Abnormal [   ]  Motor Strength: WFL all extremities  [  x ]  Abnormal [   ]  Sensation: intact to light touch [ x  ] Abnormal [   ]    FUNCTIONAL STATUS:  Bed Mobility: Independent [   ]  Supervision [   ]  Needs Assistance [ x  ]  N/A [   ]  Transfers: Independent [   ]  Supervision [   ]  Needs Assistance [ x  ]  N/A [   ]   Ambulation: Independent [   ]  Supervision [   ]  Needs Assistance [x   ]  N/A [   ]  ADL: Independent [   ] Requires Assistance [   ] N/A [   ]      LABS:                RADIOLOGY & ADDITIONAL STUDIES:

## 2021-07-16 NOTE — CHART NOTE - NSCHARTNOTEFT_GEN_A_CORE
<<<RESIDENT DISCHARGE NOTE>>>     SAM REA  MRN-[id number]    VITAL SIGNS:  T(F): 98.4 (07-16-21 @ 13:48), Max: 98.5 (07-15-21 @ 20:25)  HR: 87 (07-16-21 @ 14:58)  BP: 133/79 (07-16-21 @ 14:58)  SpO2: --      PHYSICAL EXAMINATION:  General: General: the patient is alert ,awake ,conversing ,in no acute distress. short attention span ,and concentration.  Pulmonary: normal bilateral air entry  Cardiovascular: normal s1 s2 ,no added sounds.  Gastrointestinal/Abdomen & Pelvis: soft ,nontender.  Neurologic/Motor: intact cranial nerves ,intact sensation ,motor 5/5 in all extremities.    TEST RESULTS:                        12.8   7.25  )-----------( 194      ( 16 Jul 2021 12:14 )             35.5       07-16    137  |  98  |  15  ----------------------------<  101<H>  3.1<L>   |  27  |  0.7    Ca    9.6      16 Jul 2021 12:14  Mg     2.1     07-16    TPro  6.5  /  Alb  4.1  /  TBili  0.4  /  DBili  x   /  AST  33  /  ALT  13  /  AlkPhos  62  07-16      FINAL DISCHARGE INTERVIEW:  Resident(s) Present: (Name:_____dr gonzalez________), RN Present: (Name:  ___________)    DISCHARGE MEDICATION RECONCILIATION  reviewed with Attending (Name:___________)    DISPOSITION:   [  ] Home,    [  ] Home with Visiting Nursing Services,   [    ]  SNF/ NH,    [   ] Acute Rehab (4A),   [   ] Other (Specify:_________)
<<<RESIDENT DISCHARGE NOTE>>>     SAM REA  MRN-[id number]    VITAL SIGNS:  T(F): 98.1 (07-15-21 @ 13:10), Max: 98.5 (07-14-21 @ 18:03)  HR: 95 (07-15-21 @ 13:10)  BP: 143/76 (07-15-21 @ 13:10)  SpO2: 98% (07-15-21 @ 03:51)      PHYSICAL EXAMINATION:  General: the patient is alert ,awake ,conversing ,in no acute distress. short attention span ,and concentration.  Head & Neck:  Pulmonary: normal bilateral air entry  Cardiovascular: normal s1 s2 ,no added sounds.  Gastrointestinal/Abdomen & Pelvis: soft ,nontender.  Neurologic/Motor: intact cranial nerves ,intact sensation ,motor 5/5 in all extremities.    TEST RESULTS:                        11.9   10.87 )-----------( 178      ( 14 Jul 2021 05:36 )             34.6       07-14    130<L>  |  95<L>  |  19  ----------------------------<  92  3.4<L>   |  23  |  0.8    Ca    9.4      14 Jul 2021 05:36  Mg     2.0     07-14    TPro  6.2  /  Alb  4.1  /  TBili  0.4  /  DBili  x   /  AST  25  /  ALT  8   /  AlkPhos  58  07-14      FINAL DISCHARGE INTERVIEW:  Resident(s) Present: (Name:__dr gonzalez___________), RN Present: (Name:  ___________)    DISCHARGE MEDICATION RECONCILIATION  reviewed with Attending (Name:___dr wade________)    DISPOSITION:   [  ] Home,    [  ] Home with Visiting Nursing Services,   [    ]  SNF/ NH,    [   ] Acute Rehab (4A),   [   ] Other (Specify:___assisted living______)
Consent (Temporal Branch)/Introductory Paragraph: The rationale for Mohs was explained to the patient and consent was obtained. The risks, benefits and alternatives to therapy were discussed in detail. Specifically, the risks of damage to the temporal branch of the facial nerve, infection, scarring, bleeding, prolonged wound healing, incomplete removal, allergy to anesthesia, and recurrence were addressed. Prior to the procedure, the treatment site was clearly identified and confirmed by the patient. All components of Universal Protocol/PAUSE Rule completed.

## 2021-07-16 NOTE — SWALLOW BEDSIDE ASSESSMENT ADULT - SLP GENERAL OBSERVATIONS
pt asleep however arousable. On RA, dysarthria
pt asleep however arousable. pt required cues to maintain arousal. Pt oriented to self and place. NO c/o pain. + dysarthria

## 2021-07-16 NOTE — PROGRESS NOTE ADULT - SUBJECTIVE AND OBJECTIVE BOX
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 Per NH staff, patient was in bathroom for a long time, when staff entered the room found him sitting on the toilet confused and slurring speech. Ptn's roommate who was with him 45 minutes before says he was at his baseline at that time (AOx3), so staff brought him to ED for evaluation    In the ED /72  HR 81  RR 18  saturating well on room air. Code stroke was called, CT head negative but CTA right vertebral artery malformation, could not rule out vertebral dissection. Ptn AOx3 but with slurred speech. Labs remarkable for Na 128, stably improved from 111 on last admission 7/08. Ptn admitted to medicine for further neuro workup of acute slurring speech secondary to possible acute vertebral dissection         (14 Jul 2021 02:05)      Interval History - Patient seen and examined and no new complaints.  Awaiting dc to assisted living          Vital Signs Last 24 Hrs  T(C): 36.9 (16 Jul 2021 13:48), Max: 36.9 (15 Jul 2021 20:25)  T(F): 98.4 (16 Jul 2021 13:48), Max: 98.5 (15 Jul 2021 20:25)  HR: 87 (16 Jul 2021 14:58) (77 - 87)  BP: 133/79 (16 Jul 2021 14:58) (133/79 - 172/80)  BP(mean): 99 (16 Jul 2021 14:58) (99 - 99)  RR: 18 (16 Jul 2021 13:48) (18 - 18)  SpO2: --  ICU Vital Signs Last 24 Hrs  T(C): 36.9 (16 Jul 2021 13:48), Max: 36.9 (15 Jul 2021 20:25)  T(F): 98.4 (16 Jul 2021 13:48), Max: 98.5 (15 Jul 2021 20:25)  HR: 87 (16 Jul 2021 14:58) (77 - 87)  BP: 133/79 (16 Jul 2021 14:58) (133/79 - 172/80)  BP(mean): 99 (16 Jul 2021 14:58) (99 - 99)  ABP: --  ABP(mean): --  RR: 18 (16 Jul 2021 13:48) (18 - 18)  SpO2: --          Neurological Exam:   a+Ox3 language and attention normal  CN 2-12 normal; mild dysarthria which patient says is baseline speech  NO drift power 5/5  LT symmetric throughout  FTN NL    NIHSS 1 (baseline)    Medications  amLODIPine   Tablet 10 milliGRAM(s) Oral at bedtime  aspirin enteric coated 81 milliGRAM(s) Oral daily  atorvastatin 80 milliGRAM(s) Oral at bedtime  busPIRone 15 milliGRAM(s) Oral two times a day  clopidogrel Tablet 75 milliGRAM(s) Oral daily  diVALproex  milliGRAM(s) Oral two times a day  enoxaparin Injectable 40 milliGRAM(s) SubCutaneous daily  haloperidol     Tablet 5 milliGRAM(s) Oral at bedtime      Lab                        12.8   7.25  )-----------( 194      ( 16 Jul 2021 12:14 )             35.5     07-16    137  |  98  |  15  ----------------------------<  101<H>  3.1<L>   |  27  |  0.7    Ca    9.6      16 Jul 2021 12:14  Mg     2.1     07-16    TPro  6.5  /  Alb  4.1  /  TBili  0.4  /  DBili  x   /  AST  33  /  ALT  13  /  AlkPhos  62  07-16    CAPILLARY BLOOD GLUCOSE        LIVER FUNCTIONS - ( 16 Jul 2021 12:14 )  Alb: 4.1 g/dL / Pro: 6.5 g/dL / ALK PHOS: 62 U/L / ALT: 13 U/L / AST: 33 U/L / GGT: x           LDL Cholesterol Calculated: 99 mg/dL (07.14.21 @ 05:36)    A1C with Estimated Average Glucose Result: 5.4: Method: Immunoassay       Reference Range                4.0-5.6%       High risk (prediabetic)        5.7-6.4%       Diabetic, diagnostic             >=6.5%       ADA diabetic treatment goal       <7.0%  The Hemoglobin A1c testing is NGSP-certified.Reference ranges are based  upon the 2010 recommendations of  the American Diabetes Association.  Interpretation may vary for children  and adolescents. % (07.14.21 @ 05:36)        Radiology  < from: MR Head No Cont (07.14.21 @ 22:37) >    IMPRESSION:    Motion limited examination.    There is no evidence of acute intracranial pathology. No evidence of acute infarct, intracranial hemorrhage or mass effect.    Mild chronic microvascular changes    Incidentally noted is a partially visualized well-circumscribed lesion within the left parotid gland which appears slightly increased in size in comparison to prior studies dating back to 10/1/2015. Further evaluation with MRI of the neck with and without contrast is recommended.    < end of copied text >      < from: CT Angio Neck w/ IV Cont (07.13.21 @ 23:32) >    IMPRESSION:    1. Vascular web versus focal intimal flap in the V3 segment of the right vertebral artery.  2. Focal 50% stenosis and a superior M2 branch of the right middle cerebral artery.  3. Scattered small areas of possible ischemia in the posterior fossa.    An MRI of the head and MRAof the head and neck is recommended to evaluate for stroke and vertebral dissection.    < end of copied text >      Other studies:     Assessment- 60y Male with PMHx of htn, Depression, HDL, Schizophrenia biba from assisted living residence for "sitting on toilet unresponsive." Pt found to be hypotensive upon EMS arrival. Pt room mate states an hour prior pt was at baseline. Code stroke activated upon arrival to ED. NIHSS 1. CT Head negative. CTA and CT perfusion completed.     Plan  1. Continue aspirin and plavix for 21 days and then would discontinue aspirin and continue plavix  2. Atorvastatin 40mg QD  3. smoking cessation  4. Can follow up in 3-4 weeks in stroke clinic

## 2021-07-16 NOTE — CONSULT NOTE ADULT - ASSESSMENT
IMPRESSION: Rehab of gait dysfunction / r/o cva    PRECAUTIONS: [   ] Cardiac  [   ] Respiratory  [   ] Seizures [   ] Contact Isolation  [   ] Droplet Isolation  [   ] Other    Weight Bearing Status:     RECOMMENDATION:    Out of Bed to Chair     DVT/Decubiti Prophylaxis    REHAB PLAN:     [  x  ] Bedside P/T 3-5 times a week   [    ]   Bedside O/T  2-3 times a week             [  x  ] Speech Therapy               [    ]  No Rehab Therapy Indicated   Conditioning/ROM                                    ADL  Bed Mobility                                               Conditioning/ROM  Transfers                                                     Bed Mobility  Sitting /Standing Balance                         Transfers                                        Gait Training                                               Sitting/Standing Balance  Stair Training [   ]Applicable                    Home equipment Eval                                                                        Splinting  [   ] Only      GOALS:   ADL   [    ]   Independent                    Transfers  [x    ] Independent                          Ambulation  [  x  ] Independent     [    x ] With device                            [    ]  CG                                                         [    ]  CG                                                                  [    ] CG                            [    ] Min A                                                   [    ] Min A                                                              [    ] Min  A          DISCHARGE PLAN:   [    ]  Good candidate for Intensive Rehabilitation/Hospital based                                             Will tolerate 3hrs Intensive Rehab Daily                                       [   x  ]  Short Term Rehab in Skilled Nursing Facility  /  adult home                                       [     ]  Home with Outpatient or  services                                         [     ]  Possible Candidate for Intensive Hospital based Rehab

## 2021-07-17 ENCOUNTER — EMERGENCY (EMERGENCY)
Facility: HOSPITAL | Age: 61
LOS: 0 days | Discharge: NURSING, REHAB OR LONG-TERM CARE | End: 2021-07-17
Attending: EMERGENCY MEDICINE | Admitting: EMERGENCY MEDICINE
Payer: MEDICARE

## 2021-07-17 VITALS
DIASTOLIC BLOOD PRESSURE: 70 MMHG | HEIGHT: 68 IN | TEMPERATURE: 98 F | HEART RATE: 106 BPM | OXYGEN SATURATION: 96 % | SYSTOLIC BLOOD PRESSURE: 126 MMHG | RESPIRATION RATE: 18 BRPM

## 2021-07-17 VITALS
RESPIRATION RATE: 18 BRPM | SYSTOLIC BLOOD PRESSURE: 131 MMHG | TEMPERATURE: 98 F | OXYGEN SATURATION: 95 % | HEART RATE: 85 BPM | DIASTOLIC BLOOD PRESSURE: 65 MMHG

## 2021-07-17 DIAGNOSIS — Z88.7 ALLERGY STATUS TO SERUM AND VACCINE: ICD-10-CM

## 2021-07-17 DIAGNOSIS — Z00.8 ENCOUNTER FOR OTHER GENERAL EXAMINATION: ICD-10-CM

## 2021-07-17 DIAGNOSIS — Z00.00 ENCOUNTER FOR GENERAL ADULT MEDICAL EXAMINATION WITHOUT ABNORMAL FINDINGS: ICD-10-CM

## 2021-07-17 DIAGNOSIS — Z79.899 OTHER LONG TERM (CURRENT) DRUG THERAPY: ICD-10-CM

## 2021-07-17 DIAGNOSIS — Z79.82 LONG TERM (CURRENT) USE OF ASPIRIN: ICD-10-CM

## 2021-07-17 PROCEDURE — 99282 EMERGENCY DEPT VISIT SF MDM: CPT

## 2021-07-17 NOTE — ED ADULT NURSE NOTE - OBJECTIVE STATEMENT
Pt was sent in by MD in [facility] because physician does not feel comfortable with medications. Pt denies any issues and is requesting to go home

## 2021-07-17 NOTE — ED PROVIDER NOTE - PRIOR HOSPITAL/ED VISITS SUMMARY FREE TEXT FOR MDM OBTAINED AND REVIEWED OLD RECORDS QUESTION
pt was seen by multiple disciplinary services in cluding psych, neuro, physiatry and all deemed stable for dc back to assisted living.

## 2021-07-17 NOTE — ED PROVIDER NOTE - PROGRESS NOTE DETAILS
called [facility], Dr Glez is not there. his # is not picking up.  Spoke with staff at [facility] and told them pt is coming back, he is stable, alert, walking around the ED asking to go home.

## 2021-07-17 NOTE — ED ADULT TRIAGE NOTE - CHIEF COMPLAINT QUOTE
59 yo M sent from [facility] for, "unsafe d/c from [facility] as per primary MD" as per transfer sheet "multiple BP & psychiatric medications changes that MD is not comfortable with."  Primary MD Dr Glez. patient with no medical complaints at this time.     Wilmer Glez

## 2021-07-17 NOTE — ED PROVIDER NOTE - PATIENT PORTAL LINK FT
You can access the FollowMyHealth Patient Portal offered by [facility] by registering at the following website: [email or web address]. By joining [facility]’s FollowMyHealth portal, you will also be able to view your health information using other applications (apps) compatible with our system.

## 2021-07-17 NOTE — ED ADULT NURSE NOTE - CHIEF COMPLAINT QUOTE
59 yo M sent from [facility] for, "unsafe d/c from [facility] as per primary MD" as per transfer sheet "multiple BP & psychiatric medications changes that MD is not comfortable with."  Primary MD Dr Glez. patient with no medical complaints at this time.     Wilmer Glez

## 2021-07-21 DIAGNOSIS — E83.42 HYPOMAGNESEMIA: ICD-10-CM

## 2021-07-21 DIAGNOSIS — E87.1 HYPO-OSMOLALITY AND HYPONATREMIA: ICD-10-CM

## 2021-07-21 DIAGNOSIS — F32.9 MAJOR DEPRESSIVE DISORDER, SINGLE EPISODE, UNSPECIFIED: ICD-10-CM

## 2021-07-21 DIAGNOSIS — R63.1 POLYDIPSIA: ICD-10-CM

## 2021-07-21 DIAGNOSIS — F17.200 NICOTINE DEPENDENCE, UNSPECIFIED, UNCOMPLICATED: ICD-10-CM

## 2021-07-21 DIAGNOSIS — I10 ESSENTIAL (PRIMARY) HYPERTENSION: ICD-10-CM

## 2021-07-21 DIAGNOSIS — R79.89 OTHER SPECIFIED ABNORMAL FINDINGS OF BLOOD CHEMISTRY: ICD-10-CM

## 2021-07-21 DIAGNOSIS — F63.89 OTHER IMPULSE DISORDERS: ICD-10-CM

## 2021-07-21 DIAGNOSIS — E78.5 HYPERLIPIDEMIA, UNSPECIFIED: ICD-10-CM

## 2021-07-21 DIAGNOSIS — R45.851 SUICIDAL IDEATIONS: ICD-10-CM

## 2021-07-21 DIAGNOSIS — F25.9 SCHIZOAFFECTIVE DISORDER, UNSPECIFIED: ICD-10-CM

## 2021-07-21 DIAGNOSIS — E87.6 HYPOKALEMIA: ICD-10-CM

## 2021-07-23 DIAGNOSIS — R41.82 ALTERED MENTAL STATUS, UNSPECIFIED: ICD-10-CM

## 2021-07-23 DIAGNOSIS — E87.1 HYPO-OSMOLALITY AND HYPONATREMIA: ICD-10-CM

## 2021-07-23 DIAGNOSIS — R63.1 POLYDIPSIA: ICD-10-CM

## 2021-07-23 DIAGNOSIS — R55 SYNCOPE AND COLLAPSE: ICD-10-CM

## 2021-07-23 DIAGNOSIS — F32.9 MAJOR DEPRESSIVE DISORDER, SINGLE EPISODE, UNSPECIFIED: ICD-10-CM

## 2021-07-23 DIAGNOSIS — R47.81 SLURRED SPEECH: ICD-10-CM

## 2021-07-23 DIAGNOSIS — Z79.82 LONG TERM (CURRENT) USE OF ASPIRIN: ICD-10-CM

## 2021-07-23 DIAGNOSIS — E78.5 HYPERLIPIDEMIA, UNSPECIFIED: ICD-10-CM

## 2021-07-23 DIAGNOSIS — I10 ESSENTIAL (PRIMARY) HYPERTENSION: ICD-10-CM

## 2021-07-23 DIAGNOSIS — Z88.7 ALLERGY STATUS TO SERUM AND VACCINE: ICD-10-CM

## 2021-07-23 DIAGNOSIS — F20.9 SCHIZOPHRENIA, UNSPECIFIED: ICD-10-CM

## 2021-07-23 DIAGNOSIS — Z20.822 CONTACT WITH AND (SUSPECTED) EXPOSURE TO COVID-19: ICD-10-CM

## 2021-07-23 DIAGNOSIS — I65.22 OCCLUSION AND STENOSIS OF LEFT CAROTID ARTERY: ICD-10-CM

## 2021-07-23 DIAGNOSIS — Q28.0 ARTERIOVENOUS MALFORMATION OF PRECEREBRAL VESSELS: ICD-10-CM

## 2021-07-23 DIAGNOSIS — I77.74 DISSECTION OF VERTEBRAL ARTERY: ICD-10-CM

## 2021-08-05 ENCOUNTER — EMERGENCY (EMERGENCY)
Facility: HOSPITAL | Age: 61
LOS: 0 days | Discharge: HOME | End: 2021-08-06
Attending: EMERGENCY MEDICINE | Admitting: EMERGENCY MEDICINE
Payer: MEDICARE

## 2021-08-05 VITALS
HEART RATE: 70 BPM | WEIGHT: 259.93 LBS | DIASTOLIC BLOOD PRESSURE: 99 MMHG | TEMPERATURE: 98 F | SYSTOLIC BLOOD PRESSURE: 174 MMHG | RESPIRATION RATE: 17 BRPM | OXYGEN SATURATION: 96 % | HEIGHT: 68 IN

## 2021-08-05 DIAGNOSIS — Z79.82 LONG TERM (CURRENT) USE OF ASPIRIN: ICD-10-CM

## 2021-08-05 DIAGNOSIS — E78.5 HYPERLIPIDEMIA, UNSPECIFIED: ICD-10-CM

## 2021-08-05 DIAGNOSIS — Z79.899 OTHER LONG TERM (CURRENT) DRUG THERAPY: ICD-10-CM

## 2021-08-05 DIAGNOSIS — I10 ESSENTIAL (PRIMARY) HYPERTENSION: ICD-10-CM

## 2021-08-05 DIAGNOSIS — Z88.8 ALLERGY STATUS TO OTHER DRUGS, MEDICAMENTS AND BIOLOGICAL SUBSTANCES STATUS: ICD-10-CM
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
## 2021-08-05 DIAGNOSIS — Z86.59 PERSONAL HISTORY OF OTHER MENTAL AND BEHAVIORAL DISORDERS: ICD-10-CM
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
## 2021-08-05 DIAGNOSIS — Z00.00 ENCOUNTER FOR GENERAL ADULT MEDICAL EXAMINATION WITHOUT ABNORMAL FINDINGS: ICD-10-CM

## 2021-08-05 DIAGNOSIS — T73.0XXA STARVATION, INITIAL ENCOUNTER: ICD-10-CM

## 2021-08-05 DIAGNOSIS — F17.200 NICOTINE DEPENDENCE, UNSPECIFIED, UNCOMPLICATED: ICD-10-CM

## 2021-08-05 DIAGNOSIS — F20.9 SCHIZOPHRENIA, UNSPECIFIED: ICD-10-CM

## 2021-08-05 PROCEDURE — 99283 EMERGENCY DEPT VISIT LOW MDM: CPT

## 2021-08-05 NOTE — ED ADULT TRIAGE NOTE - STATUS:
For more information:    Quit Now [place]  1-800-QUIT-NOW  https://[place].quitlogix.org/en-US/  Steps to Quit Smoking  Smoking tobacco can be harmful to your health and can affect almost every organ in your body. Smoking puts you, and those around you, at risk for developing many serious chronic diseases. Quitting smoking is difficult, but it is one of the best things that you can do for your health. It is never too late to quit.  What are the benefits of quitting smoking?  When you quit smoking, you lower your risk of developing serious diseases and conditions, such as:  · Lung cancer or lung disease, such as COPD.  · Heart disease.  · Stroke.  · Heart attack.  · Infertility.  · Osteoporosis and bone fractures.  Additionally, symptoms such as coughing, wheezing, and shortness of breath may get better when you quit. You may also find that you get sick less often because your body is stronger at fighting off colds and infections. If you are pregnant, quitting smoking can help to reduce your chances of having a baby of low birth weight.  How do I get ready to quit?  When you decide to quit smoking, create a plan to make sure that you are successful. Before you quit:  · Pick a date to quit. Set a date within the next two weeks to give you time to prepare.  · Write down the reasons why you are quitting. Keep this list in places where you will see it often, such as on your bathroom mirror or in your car or wallet.  · Identify the people, places, things, and activities that make you want to smoke (triggers) and avoid them. Make sure to take these actions:  ¨ Throw away all cigarettes at home, at work, and in your car.  ¨ Throw away smoking accessories, such as ashtrays and lighters.  ¨ Clean your car and make sure to empty the ashtray.  ¨ Clean your home, including curtains and carpets.  · Tell your family, friends, and coworkers that you are quitting. Support from your loved ones can make quitting easier.  · Talk with  your health care provider about your options for quitting smoking.  · Find out what treatment options are covered by your health insurance.  What strategies can I use to quit smoking?  Talk with your healthcare provider about different strategies to quit smoking. Some strategies include:  · Quitting smoking altogether instead of gradually lessening how much you smoke over a period of time. Research shows that quitting “cold turkey” is more successful than gradually quitting.  · Attending in-person counseling to help you build problem-solving skills. You are more likely to have success in quitting if you attend several counseling sessions. Even short sessions of 10 minutes can be effective.  · Finding resources and support systems that can help you to quit smoking and remain smoke-free after you quit. These resources are most helpful when you use them often. They can include:  ¨ Online chats with a counselor.  ¨ Telephone quitlines.  ¨ Printed self-help materials.  ¨ Support groups or group counseling.  ¨ Text messaging programs.  ¨ Mobile phone applications.  · Taking medicines to help you quit smoking. (If you are pregnant or breastfeeding, talk with your health care provider first.) Some medicines contain nicotine and some do not. Both types of medicines help with cravings, but the medicines that include nicotine help to relieve withdrawal symptoms. Your health care provider may recommend:  ¨ Nicotine patches, gum, or lozenges.  ¨ Nicotine inhalers or sprays.  ¨ Non-nicotine medicine that is taken by mouth.  Talk with your health care provider about combining strategies, such as taking medicines while you are also receiving in-person counseling. Using these two strategies together makes you more likely to succeed in quitting than if you used either strategy on its own.  If you are pregnant or breastfeeding, talk with your health care provider about finding counseling or other support strategies to quit smoking. Do  not take medicine to help you quit smoking unless told to do so by your health care provider.  What things can I do to make it easier to quit?  Quitting smoking might feel overwhelming at first, but there is a lot that you can do to make it easier. Take these important actions:  · Reach out to your family and friends and ask that they support and encourage you during this time. Call telephone quitlines, reach out to support groups, or work with a counselor for support.  · Ask people who smoke to avoid smoking around you.  · Avoid places that trigger you to smoke, such as bars, parties, or smoke-break areas at work.  · Spend time around people who do not smoke.  · Lessen stress in your life, because stress can be a smoking trigger for some people. To lessen stress, try:  ¨ Exercising regularly.  ¨ Deep-breathing exercises.  ¨ Yoga.  ¨ Meditating.  ¨ Performing a body scan. This involves closing your eyes, scanning your body from head to toe, and noticing which parts of your body are particularly tense. Purposefully relax the muscles in those areas.  · Download or purchase mobile phone or tablet apps (applications) that can help you stick to your quit plan by providing reminders, tips, and encouragement. There are many free apps, such as QuitGuide from the CDC (Centers for Disease Control and Prevention). You can find other support for quitting smoking (smoking cessation) through smokefree.gov and other websites.  How will I feel when I quit smoking?  Within the first 24 hours of quitting smoking, you may start to feel some withdrawal symptoms. These symptoms are usually most noticeable 2-3 days after quitting, but they usually do not last beyond 2-3 weeks. Changes or symptoms that you might experience include:  · Mood swings.  · Restlessness, anxiety, or irritation.  · Difficulty concentrating.  · Dizziness.  · Strong cravings for sugary foods in addition to nicotine.  · Mild weight  gain.  · Constipation.  · Nausea.  · Coughing or a sore throat.  · Changes in how your medicines work in your body.  · A depressed mood.  · Difficulty sleeping (insomnia).  After the first 2-3 weeks of quitting, you may start to notice more positive results, such as:  · Improved sense of smell and taste.  · Decreased coughing and sore throat.  · Slower heart rate.  · Lower blood pressure.  · Clearer skin.  · The ability to breathe more easily.  · Fewer sick days.  Quitting smoking is very challenging for most people. Do not get discouraged if you are not successful the first time. Some people need to make many attempts to quit before they achieve long-term success. Do your best to stick to your quit plan, and talk with your health care provider if you have any questions or concerns.  This information is not intended to replace advice given to you by your health care provider. Make sure you discuss any questions you have with your health care provider.  Document Released: 12/12/2002 Document Revised: 08/15/2017 Document Reviewed: 05/03/2016  Elsevier Interactive Patient Education © 2017 Elsevier Inc.     [employer]

## 2021-08-06 VITALS
OXYGEN SATURATION: 98 % | TEMPERATURE: 98 F | HEART RATE: 73 BPM | RESPIRATION RATE: 16 BRPM | SYSTOLIC BLOOD PRESSURE: 178 MMHG | DIASTOLIC BLOOD PRESSURE: 98 MMHG

## 2021-08-06 NOTE — ED PROVIDER NOTE - OBJECTIVE STATEMENT
61 y/o M with PMH HTN, HLD, schizophrenia, depression, hx IPP admissions in the past, from [facility], relates he called EMS to bring him from [facility] because he wanted something to eat.   denies any complaints including CP/sob/pain anywheren/v/d/ab pain/SI/HI/hallucinations/trauma/ETOH or drug use.

## 2021-08-06 NOTE — ED PROVIDER NOTE - COVID-19 ORDERING FACILITY
[FreeTextEntry1] : BARIATRIC SURGERY HISTORY: none\par OBESITY CO-MORBIDITIES: MADELYN,  prediabetes\par ANTI-OBESITY MEDICATIONS: trulicity, belviq, metformin, topiramate \par OBESITY MEDICATION SIDE EFFECTS: none \par  \par \par Plan: \par \par continue to f/u with RD- shake for breakfast, ideally larger lunch and smaller dinner. maybe consider 2 MR shakes and 1 meal. \par increase exercise, something physical daily. wear step counter \par Continue current medication regime, increase topiramate to 75mg PO QHS. Call office if any difficulties.  Will not consider phentermine at this time b/c patient's elevated heart rate and BP. \par Wear CPAP nightly, goal >6 hours a night. \par \par RTO 6 weeks 
[facility]

## 2021-08-06 NOTE — ED PROVIDER NOTE - ATTENDING CONTRIBUTION TO CARE
I personally evaluated the patient. I reviewed the Resident’s or Physician Assistant’s note (as assigned above), and agree with the findings and plan except as documented in my note.     60 male here for medical evaluation from an assisted living facility. Has no complaints in ED. History of psychiatric disorder.     ROS otherwise unremarkable    PE: male in no distress. CV: pulses intact. CHEST: normal work of breathing. ABD: nondistended. SKIN: normal. EXT: FROM. NEURO: AAO 3 no focal deficits.     Impression: medical screening exam    Plan: IV labs imaging supportive care and reevaluation

## 2021-08-06 NOTE — ED PROVIDER NOTE - CLINICAL SUMMARY MEDICAL DECISION MAKING FREE TEXT BOX
60 male here for medical screening exam. Had screening exam supportive care and reevaluation, no acute emergent findings, will discharge with outpatient management and return and follow up instructions.

## 2021-08-06 NOTE — ED PROVIDER NOTE - PHYSICAL EXAMINATION
PHYSICAL EXAM:    GENERAL: Alert, appears stated age, well appearing, non-toxic  SKIN: Warm, pink and dry. MMM. no evidence of trauma.   HEAD: NC, AT, no step offs   EYE: Normal lids/conjunctiva  ENT: Normal hearing, patent oropharynx   NECK: +supple. No meningismus  Pulm: Bilateral BS, normal resp effort, no wheezes, stridor, or retractions  CV: RRR, no M/R/G, 2+and = radial pulses  Abd: soft, non-tender, non-distended, no  rebound/guarding.   Mskel: no erythema, cyanosis, edema. no calf tenderness  Neuro: AAOx3, 5/5 strength throughout.

## 2021-08-06 NOTE — ED PROVIDER NOTE - PATIENT PORTAL LINK FT
You can access the FollowMyHealth Patient Portal offered by [facility] by registering at the following website: [email or web address]. By joining [facility]’s FollowMyHealth portal, you will also be able to view your health information using other applications (apps) compatible with our system.

## 2021-08-09 NOTE — ED ADULT TRIAGE NOTE - NS ED TRIAGE AVPU SCALE
Alert-The patient is alert, awake and responds to voice. The patient is oriented to time, place, and person. The triage nurse is able to obtain subjective information. details… detailed exam

## 2021-08-27 ENCOUNTER — RX RENEWAL (OUTPATIENT)
Age: 61
End: 2021-08-27

## 2021-09-07 NOTE — BEHAVIORAL HEALTH ASSESSMENT NOTE - NS ED BHA MED ROS ALLERGIC IMMUNOLOGIC
No complaints W Plasty Text: The lesion was extirpated to the level of the fat with a #15 scalpel blade.  Given the location of the defect, shape of the defect and the proximity to free margins a W-plasty was deemed most appropriate for repair.  Using a sterile surgical marker, the appropriate transposition arms of the W-plasty were drawn incorporating the defect and placing the expected incisions within the relaxed skin tension lines where possible.    The area thus outlined was incised deep to adipose tissue with a #15 scalpel blade.  The skin margins were undermined to an appropriate distance in all directions utilizing iris scissors.  The opposing transposition arms were then transposed into place in opposite direction and anchored with interrupted buried subcutaneous sutures.

## 2021-09-08 ENCOUNTER — RX RENEWAL (OUTPATIENT)
Age: 61
End: 2021-09-08

## 2021-09-17 ENCOUNTER — RX RENEWAL (OUTPATIENT)
Age: 61
End: 2021-09-17

## 2021-10-03 ENCOUNTER — RX RENEWAL (OUTPATIENT)
Age: 61
End: 2021-10-03

## 2021-10-13 NOTE — PROGRESS NOTE BEHAVIORAL HEALTH - NSBHFUPTYPE_PSY_A_CORE
Operative Note      Patient: Robert Rasheed  YOB: 1980  MRN: [id number]    Date of Procedure: 10/13/2021    Pre-Op Diagnosis: RIGHT AC SEPERATION    Post-Op Diagnosis: Same       Procedure(s):  Right acromioclavicular joint separation open reconstruction with fascial ligament allograft    Surgeon(s):  Prabhjot Del Valle DO    Assistant:   Resident: Esperanza Russell DO; Charli Germain DO; Natalya Flores DO    Anesthesia: General    Estimated Blood Loss (mL): less than 228     Complications: None    Specimens:   * No specimens in log *    Implants:  Implant Name Type Inv.  Item Serial No.  Lot No. LRB No. Used Action   KIT ENDOSCP 2MM TIGERTAPE LOOP 3MM GAVINO DRL SUTLASS SD WIRE  KIT ENDOSCP 2MM TIGERTAPE LOOP 3MM GAVINO DRL [employer] SD WIRE  [address] [id number] Right 1 Implanted   GRAFT HUM TISS [id number] TEND PRESUTURED [facility] STRND - [id number]  GRAFT HUM TISS [id number] TEND PRESUTURED [facility] STRND [id number] [address]  Right 1 Implanted         Drains: * No LDAs found *    Detailed Description of Procedure:   Patient brought to the operative suite where he was placed on operative table supine position.  He received a general anesthetic by the department esthesia as well as 2g ancef intravenously.  The right upper extremity was then sterilely prepped and draped out in standard sterile fashion. Bryan Simmonsudsen was covered with Peggyann Caprice.  Surgical timeout was then performed per protocol by members of the surgical team.  A saber incision was demarcated starting from the posterior border the clavicle and aiming towards the coracoid.  Skin was anesthetized with 1% lidocaine with epinephrine for total of 6 cc.  Incision was then made in the skin.  Electrocautery taken subtends tissues.  Full-thickness subcutaneous flaps were developed.  A longitudinal division was then made in the clavipectoral fascia dissecting down to the anterior superior border of the clavicle.  Subperiosteal ovation Inpatient scheduled as an outpatient procedure.  He received a prescription for pain control. Lorrie Crane is to maintain shoulder immobilizer and strict nonweightbearing to the affected extremity.  He may remove the brace for active ROM elbow wrist and hand. Lorrie Crane will see us back in orthopedic office in 2 weeks for repeat evaluation or sooner if needed    Electronically signed by Mariama Cueva DO on 10/13/2021     NOTE: This report was transcribed using voice recognition software.  Every effort was made to ensure accuracy; however, inadvertent computerized transcription errors may be present

## 2021-11-15 NOTE — OCCUPATIONAL THERAPY INITIAL EVALUATION ADULT - PAIN SENSATION, RUE, REHAB EVAL
inconsistent response 2/2 decreased arousal./mild impairment
You can access the FollowMyHealth Patient Portal offered by [facility] by registering at the following website: [email or web address]. By joining [employer]’s FollowMyHealth portal, you will also be able to view your health information using other applications (apps) compatible with our system.

## 2021-11-29 ENCOUNTER — EMERGENCY (EMERGENCY)
Facility: HOSPITAL | Age: 61
LOS: 0 days | Discharge: NURSING, REHAB OR LONG-TERM CARE | End: 2021-11-30
Attending: EMERGENCY MEDICINE | Admitting: EMERGENCY MEDICINE
Payer: MEDICARE

## 2021-11-29 VITALS
RESPIRATION RATE: 18 BRPM | HEART RATE: 88 BPM | TEMPERATURE: 98 F | OXYGEN SATURATION: 95 % | SYSTOLIC BLOOD PRESSURE: 177 MMHG | HEIGHT: 68 IN | DIASTOLIC BLOOD PRESSURE: 89 MMHG

## 2021-11-29 DIAGNOSIS — F32.A DEPRESSION, UNSPECIFIED: ICD-10-CM

## 2021-11-29 DIAGNOSIS — E78.5 HYPERLIPIDEMIA, UNSPECIFIED: ICD-10-CM

## 2021-11-29 DIAGNOSIS — R25.1 TREMOR, UNSPECIFIED: ICD-10-CM

## 2021-11-29 DIAGNOSIS — R07.89 OTHER CHEST PAIN: ICD-10-CM

## 2021-11-29 DIAGNOSIS — F25.9 SCHIZOAFFECTIVE DISORDER, UNSPECIFIED: ICD-10-CM

## 2021-11-29 DIAGNOSIS — R45.851 SUICIDAL IDEATIONS: ICD-10-CM

## 2021-11-29 DIAGNOSIS — E11.9 TYPE 2 DIABETES MELLITUS WITHOUT COMPLICATIONS: ICD-10-CM

## 2021-11-29 DIAGNOSIS — Z88.7 ALLERGY STATUS TO SERUM AND VACCINE: ICD-10-CM

## 2021-11-29 DIAGNOSIS — I10 ESSENTIAL (PRIMARY) HYPERTENSION: ICD-10-CM

## 2021-11-29 DIAGNOSIS — R42 DIZZINESS AND GIDDINESS: ICD-10-CM

## 2021-11-29 DIAGNOSIS — F17.200 NICOTINE DEPENDENCE, UNSPECIFIED, UNCOMPLICATED: ICD-10-CM

## 2021-11-29 PROCEDURE — 93010 ELECTROCARDIOGRAM REPORT: CPT

## 2021-11-29 PROCEDURE — 99285 EMERGENCY DEPT VISIT HI MDM: CPT

## 2021-11-29 NOTE — ED PROVIDER NOTE - CARE PROVIDER_API CALL
Wilmer Glez)  [address] [place] [address], [place] [address]  Phone: [phone]  Fax: [phone]  Follow Up Time: 1-3 Days

## 2021-11-29 NOTE — ED PROVIDER NOTE - OBJECTIVE STATEMENT
HTN, HLD, schizophrenia, depression, hx IPP admissions in the past, from [facility] The pt is a 61y M w/ hx of HTN, HLD, schizophrenia, depression, hx IPP admissions in the past, presenting from [facility] for retrosternal, non-radiating, 5/10 chest discomfort x several days. Did not take anything for pain at home. No hx of acid reflex. No associated SOB. Denies fever, N/V, abdominal pain, diarrhea, dysuria. Of note, complaining of body tremors, denies alcohol use.

## 2021-11-29 NOTE — ED PROVIDER NOTE - ATTENDING CONTRIBUTION TO CARE
61y M w/ hx of HTN, HLD, schizophrenia, depression, hx IPP presented initially with "feeling like there is a lump near my chest." points to skin near sternum. adamantly denies that this is painful or that has any other cp. also desipite triage note, adamantly denies dizziness to me. no sob. no w/n/t, abd pain or n/v. asking to be admitted "for a few weeks to check some things out." will not elaborate on this further. no si/hi or avh initially. on exam, nontoxic, vss   Gen: Alert, NAD  Skin: Warm, dry, intact  Head: NCAT  ENT: Mucous membranes moist  Neck: Supple  CV: RRR, normal S1, S2, no m/r/g  Resp: Non labored respirations, Lungs CTA b/l, no wheezes, rales, rhonchi  Abdomen: Soft, nondistended, nontender  Extremities: Moving all extremities, no edema  Neuro: No focal neuro deficits, AAOx3, CNs II-XII intact, strength 5/5 in b/l shoulder abduction/adduction, elbow flexion/extension, wrist flexion/extension, interossei, hand , hip flexion/extension, knee flexion/extension, ankle flexion/extension, great toe flexion/extension, sensation grossly intact, FTN intact, no dysmetria, normal gait, no ataxia, no drift   Psych: Cooperative    story quite atypical for acs. trop neg and ekg not sig different from prior. from cardiac standpoint felt clear for d/c. also normal neuro exam and no dizziness so clear from emergent neuro standpoint. while pending transport back to his living facility, pt endorsed SI with plan to cut wrists. placed on 1:1 and signed out pending psych eval.

## 2021-11-29 NOTE — ED ADULT NURSE NOTE - OBJECTIVE STATEMENT
pt is aao x 4, complain of chest pain, denies any other complain at this time. Safety maintained. Will continue to monitor

## 2021-11-29 NOTE — ED PROVIDER NOTE - CARE PROVIDERS DIRECT ADDRESSES
[email or web address]

## 2021-11-29 NOTE — ED PROVIDER NOTE - PATIENT PORTAL LINK FT
You can access the FollowMyHealth Patient Portal offered by [facility] by registering at the following website: [email or web address]. By joining [employer]’s FollowMyHealth portal, you will also be able to view your health information using other applications (apps) compatible with our system. You can access the FollowMyHealth Patient Portal offered by [facility] by registering at the following website: [email or web address]. By joining [employer]’s FollowMyHealth portal, you will also be able to view your health information using other applications (apps) compatible with our system.

## 2021-11-29 NOTE — ED PROVIDER NOTE - NS ED ROS FT
Constitutional: (-) fever  Eyes/ENT: (-) blurry vision, (-) epistaxis  Cardiovascular: (+) chest pain, (-) syncope  Respiratory: (-) cough, (-) shortness of breath  Gastrointestinal: (-) vomiting, (-) diarrhea  Musculoskeletal: (-) neck pain, (-) back pain, (-) joint pain  Integumentary: (-) rash, (-) edema  Neurological: (-) headache, (-) altered mental status, (+) tremors   Psychiatric: (-) hallucinations  Allergic/Immunologic: (-) pruritus

## 2021-11-30 VITALS
DIASTOLIC BLOOD PRESSURE: 74 MMHG | TEMPERATURE: 99 F | HEART RATE: 68 BPM | OXYGEN SATURATION: 95 % | SYSTOLIC BLOOD PRESSURE: 159 MMHG | RESPIRATION RATE: 18 BRPM

## 2021-11-30 LAB
ALBUMIN SERPL ELPH-MCNC: 4.1 G/DL — SIGNIFICANT CHANGE UP (ref 3.5–5.2)
ALP SERPL-CCNC: 81 U/L — SIGNIFICANT CHANGE UP (ref 30–115)
ALT FLD-CCNC: 6 U/L — SIGNIFICANT CHANGE UP (ref 0–41)
ANION GAP SERPL CALC-SCNC: 17 MMOL/L — HIGH (ref 7–14)
AST SERPL-CCNC: 10 U/L — SIGNIFICANT CHANGE UP (ref 0–41)
BASOPHILS # BLD AUTO: 0.05 K/UL — SIGNIFICANT CHANGE UP (ref 0–0.2)
BASOPHILS NFR BLD AUTO: 0.7 % — SIGNIFICANT CHANGE UP (ref 0–1)
BILIRUB SERPL-MCNC: 0.3 MG/DL — SIGNIFICANT CHANGE UP (ref 0.2–1.2)
BUN SERPL-MCNC: 12 MG/DL — SIGNIFICANT CHANGE UP (ref 10–20)
CALCIUM SERPL-MCNC: 9.8 MG/DL — SIGNIFICANT CHANGE UP (ref 8.5–10.1)
CHLORIDE SERPL-SCNC: 97 MMOL/L — LOW (ref 98–110)
CO2 SERPL-SCNC: 21 MMOL/L — SIGNIFICANT CHANGE UP (ref 17–32)
CREAT SERPL-MCNC: 0.7 MG/DL — SIGNIFICANT CHANGE UP (ref 0.7–1.5)
EOSINOPHIL # BLD AUTO: 0.19 K/UL — SIGNIFICANT CHANGE UP (ref 0–0.7)
EOSINOPHIL NFR BLD AUTO: 2.6 % — SIGNIFICANT CHANGE UP (ref 0–8)
GLUCOSE SERPL-MCNC: 95 MG/DL — SIGNIFICANT CHANGE UP (ref 70–99)
HCT VFR BLD CALC: 36 % — LOW (ref 42–52)
HGB BLD-MCNC: 12.2 G/DL — LOW (ref 14–18)
IMM GRANULOCYTES NFR BLD AUTO: 0.1 % — SIGNIFICANT CHANGE UP (ref 0.1–0.3)
LYMPHOCYTES # BLD AUTO: 2.11 K/UL — SIGNIFICANT CHANGE UP (ref 1.2–3.4)
LYMPHOCYTES # BLD AUTO: 29 % — SIGNIFICANT CHANGE UP (ref 20.5–51.1)
MAGNESIUM SERPL-MCNC: 2 MG/DL — SIGNIFICANT CHANGE UP (ref 1.8–2.4)
MCHC RBC-ENTMCNC: 26.9 PG — LOW (ref 27–31)
MCHC RBC-ENTMCNC: 33.9 G/DL — SIGNIFICANT CHANGE UP (ref 32–37)
MCV RBC AUTO: 79.5 FL — LOW (ref 80–94)
MONOCYTES # BLD AUTO: 0.67 K/UL — HIGH (ref 0.1–0.6)
MONOCYTES NFR BLD AUTO: 9.2 % — SIGNIFICANT CHANGE UP (ref 1.7–9.3)
NEUTROPHILS # BLD AUTO: 4.25 K/UL — SIGNIFICANT CHANGE UP (ref 1.4–6.5)
NEUTROPHILS NFR BLD AUTO: 58.4 % — SIGNIFICANT CHANGE UP (ref 42.2–75.2)
NRBC # BLD: 0 /100 WBCS — SIGNIFICANT CHANGE UP (ref 0–0)
PLATELET # BLD AUTO: 238 K/UL — SIGNIFICANT CHANGE UP (ref 130–400)
POTASSIUM SERPL-MCNC: 3.4 MMOL/L — LOW (ref 3.5–5)
POTASSIUM SERPL-SCNC: 3.4 MMOL/L — LOW (ref 3.5–5)
PROT SERPL-MCNC: 6.8 G/DL — SIGNIFICANT CHANGE UP (ref 6–8)
RBC # BLD: 4.53 M/UL — LOW (ref 4.7–6.1)
RBC # FLD: 13.4 % — SIGNIFICANT CHANGE UP (ref 11.5–14.5)
SODIUM SERPL-SCNC: 135 MMOL/L — SIGNIFICANT CHANGE UP (ref 135–146)
TROPONIN T SERPL-MCNC: <0.01 NG/ML — SIGNIFICANT CHANGE UP
WBC # BLD: 7.28 K/UL — SIGNIFICANT CHANGE UP (ref 4.8–10.8)
WBC # FLD AUTO: 7.28 K/UL — SIGNIFICANT CHANGE UP (ref 4.8–10.8)

## 2021-11-30 PROCEDURE — 90792 PSYCH DIAG EVAL W/MED SRVCS: CPT | Mod: GC

## 2021-11-30 PROCEDURE — 71046 X-RAY EXAM CHEST 2 VIEWS: CPT | Mod: 26

## 2021-11-30 RX ORDER — POTASSIUM CHLORIDE 20 MEQ
20 PACKET (EA) ORAL ONCE
Refills: 0 | Status: COMPLETED | OUTPATIENT
Start: 2021-11-30 | End: 2021-11-30

## 2021-11-30 RX ADMIN — Medication 20 MILLIEQUIVALENT(S): at 01:14

## 2021-11-30 NOTE — ED BEHAVIORAL HEALTH ASSESSMENT NOTE - RISK ASSESSMENT
Low Acute Suicide Risk Risk factors include history suicidal ideations, chronic schizoaffective disorder, low education, unemployment and never .  Protective factors include supportive immediate family, stable housing, insight to mental illness with medication compliance.     Denies access to lethal means.

## 2021-11-30 NOTE — ED BEHAVIORAL HEALTH ASSESSMENT NOTE - DESCRIPTION
Diabetes Lives alone in residential home pt was cooperative. Lives alone in residential home. Pt has a brother with whom he last spoke to a week ago and the conversation was pleasant. Reports he enjoys seeing his brother and his two neices and 1 nephew. Lives  in residential home. Pt has a brother with whom he last spoke to a week ago and the conversation was pleasant. Reports he enjoys seeing his brother and his two neices and 1 nephew.

## 2021-11-30 NOTE — ED BEHAVIORAL HEALTH ASSESSMENT NOTE - DETAILS
Command auditory hallucination to cut wrists bilateral hand tremors "[employer] money for cigerettes" "cut my wrists" Reported hallucination about cutting his wrists, but later reconsider his statements, stating "I just want to be admitted." Not completed Primary ED team

## 2021-11-30 NOTE — ED BEHAVIORAL HEALTH ASSESSMENT NOTE - SAFETY PLAN ADDT'L DETAILS
Safety plan discussed with.../Education provided regarding environmental safety / lethal means restriction/Provision of National Suicide Prevention Lifeline 5-336-959-GYSV (2141)

## 2021-11-30 NOTE — ED BEHAVIORAL HEALTH ASSESSMENT NOTE - SUMMARY
Satisfactory Elmer Simon is a 60 yo M, single, residing in [facility], with HTN, HLD, psychiatric hx of chronic Schizoaffective Disorder prior IPP ( last seen here at [facility] 7/2021) managed with multiple medications (Seroquil, Haloperidol, Depakote), with PMHx of HTN, HLD and DM. Psychiatry was consulted by ED bc pt expressed suicidal ideations upon discharge. Elmer Simon is a 60 yo M, single, residing in [facility], with HTN, HLD, psychiatric hx of chronic Schizoaffective Disorder prior IPP ( last seen here at [facility] 7/2021) managed with multiple medications (Seroquil, Haloperidol, Depakote), with PMHx of HTN, HLD and DM. Psychiatry was consulted by ED bc pt expressed suicidal ideations upon discharge.    On evaluation in ED, patient is exhibiting conditional suicidal ideation in the setting of being unhappy with his current living situation at [facility]. The past couple of weeks, patient has been reaching to family member in order to move him out of the facility, but it appears family declined his request. Currently there is no acute decompensated psychiatric symptoms that requires inpatient psychiatry admission. He will benefit from continuation of treat at [facility]. At the time of the interview, he was not agitated,  psychosis was at baseline in accordance with last discharge from [facility] IPP and therefore no indication for inpatient psychiatry admission. Patient is advised to continue home psychotropic medications and to  continue follow up with outpatient psychiatrist at [facility]. while in ED may consider depakote level. Elmer Simon is a 60 yo M, single, residing in [facility], with HTN, HLD, psychiatric hx of chronic Schizoaffective Disorder prior IPP ( last seen here at [facility] 7/2021) managed with multiple medications (Seroquil, Haloperidol, Depakote), with PMHx of HTN, HLD and DM. Psychiatry was consulted by ED bc pt expressed suicidal ideations upon discharge.    On evaluation in ED, patient is exhibiting conditional suicidal ideation in the setting of being unhappy with his current living situation at [facility] The past couple of weeks, patient has been reaching to family member in order to move him ou`t of the facility, but it appears family declined his request. Currently there is no acute decompensated psychiatric symptoms that requires inpatient psychiatry admission. He will benefit from continuation of treat at [facility]. At the time of the interview, he was not agitated,  psychosis was at baseline in accordance with last discharge from [facility] IPP and therefore no indication for inpatient psychiatry admission. Patient is advised to continue home psychotropic medications and to  continue follow up with outpatient psychiatrist at [facility]. while in ED may consider depakote level.

## 2021-11-30 NOTE — ED BEHAVIORAL HEALTH ASSESSMENT NOTE - HPI (INCLUDE ILLNESS QUALITY, SEVERITY, DURATION, TIMING, CONTEXT, MODIFYING FACTORS, ASSOCIATED SIGNS AND SYMPTOMS)
Elmer Simon is a 60 yo M, single, residing in [facility], with HTN, HLD, psychiatric hx of chronic Schizoaffective Disorder prior IPP ( last seen here at [facility] 7/2021) managed with multiple medications (Seroquil, Haloperidol, Depakote), with PMHx of HTN, HLD and DM. Psychiatry was consulted by ED bc pt expressed suicidal ideations upon discharge.     Pt was seen at bedside in ED by treatment team. Upon approach pt was cooperative but asked "I am getting admitted right?" prior to starting interview. Pt reports he was brought to ED by ambulance from his [facility] yesterday. When asked why he was brought he responded "I don't know" but later disclosed I was given something for chest pain. Pt reported mentioned "I wanted to be admitted" throughout the interview. When asked why he responded because "I need my nicotine patch". When reassured he can be given a nicotine patch  in the ED he mentions thoughts of harming himself. Pt reports chronic history of command auditory hallucinations to "cut my wrist". Pt reports "I have had them forever" but denied plans on acting on them. When asked why he pt responded "there is too much blood and why would I do something stupid? like that". Pt was persisted he needs to be hospitalized and later mentioned because "so they can check me with a stethoscope and I want to put my finger in that machine" while using his arms to express a small box. Pt expressed it is used to check his blood sugar and when told he can have his nicotine patch and blood sugar tested at the residential home as well, pt responded "I am tired of that place". Pt reports he feels safe at the residential home and doesn't have conflicts with his roommates or his co-residents but expressed he does not want to go back because "I need to quite cigarettes so I need to be hospitalized on the 2nd floor for 3-4 weeks". When asked where else he would like to live, pt reported [place] with his brother and his family. Pt reports he reached out to his brother but "he has been avoiding me".   Pt reports being compliant with his medication but could not remember the specific names besides depakote. Pt reports bilateral hand tremors that cause him distress and is amenable to treatment to address this concern. Pt reports previous hospitalization for suicidal ideations "in the summer, here at [facility] and then at [facility]". Pt reports he never attempted suicide prior to those hospitalizations. Pt reports passive suicidal ideations along with chronic command hallucinations but denies any plan or intent of self-harm or suicide. Pt denies any current or past HI. Pt denied access to firearms. Pt reports history of visual hallucinations but became irritated upon further questioning stating "don't we all!". Elmer Simon is a 60 yo M, single, residing in [facility], with HTN, HLD, psychiatric hx of chronic Schizoaffective Disorder prior IPP ( last seen here at [facility] 7/2021) managed with multiple medications (Seroquil, Haloperidol, Depakote), with PMHx of HTN, HLD and DM. Psychiatry was consulted by ED bc pt expressed suicidal ideations upon discharge.     Pt was seen at bedside in ED by treatment team. Upon approach pt was cooperative but asked "I am getting admitted right?" prior to starting interview. Pt reports he was brought to ED by ambulance from his [facility] yesterday. When asked why he was brought he responded "I don't know" but later disclosed I was given something for chest pain. Pt reported mentioned "I wanted to be admitted" throughout the interview. When asked why he responded because "I need my nicotine patch". When reassured he can be given a nicotine patch  in the ED he mentions thoughts of harming himself. Pt reports chronic history of command auditory hallucinations to "cut my wrist". Pt reports "I have had them forever" but denied plans on acting on them. When asked why he pt responded "there is too much blood and why would I do something stupid? like that". Pt was persisted he needs to be hospitalized and later mentioned because "so they can check me with a stethoscope and I want to put my finger in that machine" while using his arms to express a small box. Pt expressed it is used to check his blood sugar and when told he can have his nicotine patch and blood sugar tested at the residential home as well, pt responded "I am tired of that place". Pt reports he feels safe at the residential home and doesn't have conflicts with his roommates or his co-residents but expressed he does not want to go back because "I need to quite cigarettes so I need to be hospitalized on the 2nd floor for 3-4 weeks". When asked where else he would like to live, pt reported [place] with his brother and his family. Pt reports he reached out to his brother but "he has been avoiding me".   Pt reports being compliant with his medication but could not remember the specific names besides depakote. Pt reports bilateral hand tremors that cause him distress and is amenable to treatment to address this concern. Pt reports previous hospitalization for suicidal ideations "in the summer, here at [facility] and then at [facility]". Pt reports he never attempted suicide prior to those hospitalizations. Pt reports passive suicidal ideations along with chronic command hallucinations but denies any plan or intent of self-harm or suicide. Pt reports he has had these auditory hallucinations "throughout the 80s, 90s and 2000s". Pt denies any current or past HI. Pt denied access to firearms. Pt reports history of visual hallucinations but became irritated upon further questioning stating "don't we all!". Pt reports history of past paranoia but denies any currently. Denies any delusions. Pt reports no changes or complaints with sleep and diet. Reports sleeping about 8 hours a night and eating 2-3 meals a day. Elmer Simon is a 62 yo M, single, residing in [facility], with HTN, HLD, psychiatric hx of chronic Schizoaffective Disorder prior IPP ( last seen here at [facility] 7/2021) managed with multiple medications (Seroquel, Haloperidol, Depakote), with PMHx of HTN, HLD and DM. Psychiatry was consulted by ED because pt expressed suicidal ideations upon discharge.    Pt was seen at bedside in ED by treatment team. Upon approach pt was cooperative but asked "I am getting admitted right?" prior to starting interview. Pt reports he was brought to ED by ambulance from his [facility] yesterday. When asked why he was brought he responded "I don't know" but later disclosed I was given something for chest pain. Pt reported mentioned "I wanted to be admitted" throughout the interview. When asked why he responded because "I need my nicotine patch". When reassured he can be given a nicotine patch  in the ED he mentions thoughts of harming himself. Pt reports chronic history of command auditory hallucinations to "cut my wrist". Pt reports "I have had them forever" but denied plans on acting on them. When asked why he pt responded "there is too much blood and why would I do something stupid? like that". Pt was persisted he needs to be hospitalized and later mentioned because "so they can check me with a stethoscope and I want to put my finger in that machine" while using his arms to express a small box. Pt expressed it is used to check his blood sugar and when told he can have his nicotine patch and blood sugar tested at the residential home as well, pt responded "I am tired of that place". Pt reports he feels safe at the residential home and doesn't have conflicts with his roommates or his co-residents but expressed he does not want to go back because "I need to quite cigarettes so I need to be hospitalized on the 2nd floor for 3-4 weeks". When asked where else he would like to live, pt reported [place] with his brother and his family. Pt reports he reached out to his brother but "he has been avoiding me".   At the time of the evaluation, he was calm, in no acute distress, perseverated about being admitted to the hospital, but there as no acute decompensated psychotic symptoms.       Pt reports being compliant with his medication but could not remember the specific names besides depakote. Pt reports bilateral hand tremors that cause him distress and is amenable to treatment to address this concern. Pt reports previous hospitalization for suicidal ideations "in the summer, here at [facility] and then at [facility]". Pt reports he never attempted suicide prior to those hospitalizations. Pt reports passive suicidal ideations along with chronic command hallucinations but denies any plan or intent of self-harm or suicide. Pt reports he has had these auditory hallucinations "throughout the 80s, 90s and 2000s". Pt denies any current or past HI. Pt denied access to firearms. Pt reports history of visual hallucinations but became irritated upon further questioning stating "don't we all!". Pt reports history of past paranoia but denies any currently. Denies any delusions. Pt reports no changes or complaints with sleep and diet. Reports sleeping about 8 hours a night and eating 2-3 meals a day.      Attempted made to reach out to [facility] : [phone] Elmer Simon is a 62 yo M, single, residing in [facility], with HTN, HLD, psychiatric hx of chronic Schizoaffective Disorder prior IPP ( last seen here at [facility] 7/2021) managed with multiple medications (Seroquel, Haloperidol, Depakote), with PMHx of HTN, HLD and DM. Psychiatry was consulted by ED because pt expressed suicidal ideations upon discharge.    Pt was seen at bedside in ED by treatment team. Upon approach pt was cooperative but asked "I am getting admitted right?" prior to starting interview. Pt reports he was brought to ED by ambulance from his [facility] yesterday. When asked why he was brought he responded "I don't know" but later disclosed I was given something for chest pain. Pt reported mentioned "I wanted to be admitted" throughout the interview. When asked why he responded because "I need my nicotine patch". When reassured he can be given a nicotine patch  in the ED he mentions thoughts of harming himself. Pt reports chronic history of command auditory hallucinations to "cut my wrist". Pt reports "I have had them forever" but denied plans on acting on them. When asked why he pt responded "there is too much blood and why would I do something stupid? like that". Pt was persisted he needs to be hospitalized and later mentioned because "so they can check me with a stethoscope and I want to put my finger in that machine" while using his arms to express a small box. Pt expressed it is used to check his blood sugar and when told he can have his nicotine patch and blood sugar tested at the residential home as well, pt responded "I am tired of that place". Pt reports he feels safe at the residential home and doesn't have conflicts with his roommates or his co-residents but expressed he does not want to go back because "I need to quite cigarettes so I need to be hospitalized on the 2nd floor for 3-4 weeks". When asked where else he would like to live, pt reported [place] with his brother and his family. Pt reports he reached out to his brother but "he has been avoiding me".   At the time of the evaluation, he was calm, in no acute distress, perseverated about being admitted to the hospital, but there as no acute decompensated psychotic symptoms.       Pt reports being compliant with his medication but could not remember the specific names besides depakote. Pt reports bilateral hand tremors that cause him distress and is amenable to treatment to address this concern. Pt reports previous hospitalization for suicidal ideations "in the summer, here at [facility] and then at [facility]". Pt reports he never attempted suicide prior to those hospitalizations. Pt reports passive suicidal ideations along with chronic command hallucinations but denies any plan or intent of self-harm or suicide. Pt reports he has had these auditory hallucinations "throughout the 80s, 90s and 2000s". Pt denies any current or past HI. Pt denied access to firearms. Pt reports history of visual hallucinations but became irritated upon further questioning stating "don't we all!". Pt reports history of past paranoia but denies any currently. Denies any delusions. Pt reports no changes or complaints with sleep and diet. Reports sleeping about 8 hours a night and eating 2-3 meals a day.      Attempted made to reach out to [facility] : [phone]

## 2021-12-07 NOTE — PROGRESS NOTE BEHAVIORAL HEALTH - MOOD
Impression: Exudative macular degeneration, with active choroidal neovascularization, bilateral (alternating eyes) Plan: inject Beovu OD  **obtain EYLEA Auth 

RTC 1 month ND Eylea OS Normal

## 2021-12-13 NOTE — PATIENT PROFILE BEHAVIORAL HEALTH - PRO ANTICIPATED DISCH DISP
[FreeTextEntry1] : Attending Attestation \par \par Diarrhea \par \par We discussed diarrhea at length. Treatment depends on the cause and severity of your diarrhea. You\par should drink plenty of fluids to avoid dehydration. You should avoid drinks that contain caffeine and\par milk. Milk may make diarrhea worse. Your doctor may recommend hydration drinks for your infant or\par child. People with severe dehydration may need fluid replacement via an IV line and hospitalization.\par Avoid eating greasy foods, fatty foods, and alcohol. Bananas, applesauce, rice, and toast are helpful\par foods to eat. If you feel too sick to eat, try sucking on ice chips until you can tolerate food.\par \par She will continue to take Imodium as needed. \par \par Dysphagia \par \par A low acid / reflux diet was discussed in great detail including not smoking, not drinking alcohol, and not\par consuming foods that irritate the esophagus. It is helpful to eat small meals throughout the day instead\par of large meals. You should avoid eating before bedtime or lying down after you eat. It can be helpful to\par raise the head of your bed six inches. Additionally, you should maintain a healthy weight and good\par posture.. The patient was given written material to take home and review. \par \par Time spent with patient 30 min  \par \par Patient will f/u in office in 6 months. \par \par Patient verbalized understanding of all information. All questions answered and reviewed. 
extended care facility

## 2022-03-02 ENCOUNTER — APPOINTMENT (OUTPATIENT)
Dept: INTERNAL MEDICINE | Facility: CLINIC | Age: 62
End: 2022-03-02

## 2022-04-12 ENCOUNTER — INPATIENT (INPATIENT)
Facility: HOSPITAL | Age: 62
LOS: 2 days | Discharge: NURSING, REHAB OR LONG-TERM CARE | End: 2022-04-15
Attending: INTERNAL MEDICINE | Admitting: INTERNAL MEDICINE
Payer: MEDICARE

## 2022-04-12 VITALS
DIASTOLIC BLOOD PRESSURE: 62 MMHG | WEIGHT: 259.93 LBS | HEART RATE: 55 BPM | HEIGHT: 68 IN | RESPIRATION RATE: 20 BRPM | OXYGEN SATURATION: 97 % | SYSTOLIC BLOOD PRESSURE: 159 MMHG | TEMPERATURE: 98 F

## 2022-04-12 DIAGNOSIS — M79.605 PAIN IN LEFT LEG: ICD-10-CM

## 2022-04-12 LAB
ALBUMIN SERPL ELPH-MCNC: 4 G/DL — SIGNIFICANT CHANGE UP (ref 3.5–5.2)
ALP SERPL-CCNC: 74 U/L — SIGNIFICANT CHANGE UP (ref 30–115)
ALT FLD-CCNC: <5 U/L — SIGNIFICANT CHANGE UP (ref 0–41)
ANION GAP SERPL CALC-SCNC: 11 MMOL/L — SIGNIFICANT CHANGE UP (ref 7–14)
APPEARANCE UR: CLEAR — SIGNIFICANT CHANGE UP
AST SERPL-CCNC: 10 U/L — SIGNIFICANT CHANGE UP (ref 0–41)
BASE EXCESS BLDV CALC-SCNC: 3.9 MMOL/L — HIGH (ref -2–3)
BASOPHILS # BLD AUTO: 0.04 K/UL — SIGNIFICANT CHANGE UP (ref 0–0.2)
BASOPHILS NFR BLD AUTO: 0.5 % — SIGNIFICANT CHANGE UP (ref 0–1)
BILIRUB SERPL-MCNC: 0.6 MG/DL — SIGNIFICANT CHANGE UP (ref 0.2–1.2)
BILIRUB UR-MCNC: NEGATIVE — SIGNIFICANT CHANGE UP
BUN SERPL-MCNC: 4 MG/DL — LOW (ref 10–20)
CA-I SERPL-SCNC: 1.23 MMOL/L — SIGNIFICANT CHANGE UP (ref 1.15–1.33)
CALCIUM SERPL-MCNC: 9.3 MG/DL — SIGNIFICANT CHANGE UP (ref 8.5–10.1)
CHLORIDE SERPL-SCNC: 86 MMOL/L — LOW (ref 98–110)
CK SERPL-CCNC: 188 U/L — SIGNIFICANT CHANGE UP (ref 0–225)
CO2 SERPL-SCNC: 25 MMOL/L — SIGNIFICANT CHANGE UP (ref 17–32)
COLOR SPEC: COLORLESS — SIGNIFICANT CHANGE UP
CREAT SERPL-MCNC: 0.7 MG/DL — SIGNIFICANT CHANGE UP (ref 0.7–1.5)
DIFF PNL FLD: NEGATIVE — SIGNIFICANT CHANGE UP
EGFR: 105 ML/MIN/1.73M2 — SIGNIFICANT CHANGE UP
EOSINOPHIL # BLD AUTO: 0.08 K/UL — SIGNIFICANT CHANGE UP (ref 0–0.7)
EOSINOPHIL NFR BLD AUTO: 1 % — SIGNIFICANT CHANGE UP (ref 0–8)
GAS PNL BLDV: 121 MMOL/L — LOW (ref 136–145)
GAS PNL BLDV: SIGNIFICANT CHANGE UP
GLUCOSE SERPL-MCNC: 99 MG/DL — SIGNIFICANT CHANGE UP (ref 70–99)
GLUCOSE UR QL: NEGATIVE — SIGNIFICANT CHANGE UP
HCO3 BLDV-SCNC: 28 MMOL/L — SIGNIFICANT CHANGE UP (ref 22–29)
HCT VFR BLD CALC: 33.8 % — LOW (ref 42–52)
HCT VFR BLDA CALC: 37 % — LOW (ref 39–51)
HGB BLD CALC-MCNC: 12.4 G/DL — LOW (ref 12.6–17.4)
HGB BLD-MCNC: 12.1 G/DL — LOW (ref 14–18)
IMM GRANULOCYTES NFR BLD AUTO: 0.4 % — HIGH (ref 0.1–0.3)
KETONES UR-MCNC: NEGATIVE — SIGNIFICANT CHANGE UP
LACTATE BLDV-MCNC: 1.5 MMOL/L — SIGNIFICANT CHANGE UP (ref 0.5–2)
LEUKOCYTE ESTERASE UR-ACNC: NEGATIVE — SIGNIFICANT CHANGE UP
LYMPHOCYTES # BLD AUTO: 2.09 K/UL — SIGNIFICANT CHANGE UP (ref 1.2–3.4)
LYMPHOCYTES # BLD AUTO: 26.6 % — SIGNIFICANT CHANGE UP (ref 20.5–51.1)
MAGNESIUM SERPL-MCNC: 1.6 MG/DL — LOW (ref 1.8–2.4)
MCHC RBC-ENTMCNC: 26.9 PG — LOW (ref 27–31)
MCHC RBC-ENTMCNC: 35.8 G/DL — SIGNIFICANT CHANGE UP (ref 32–37)
MCV RBC AUTO: 75.1 FL — LOW (ref 80–94)
MONOCYTES # BLD AUTO: 0.8 K/UL — HIGH (ref 0.1–0.6)
MONOCYTES NFR BLD AUTO: 10.2 % — HIGH (ref 1.7–9.3)
NEUTROPHILS # BLD AUTO: 4.83 K/UL — SIGNIFICANT CHANGE UP (ref 1.4–6.5)
NEUTROPHILS NFR BLD AUTO: 61.3 % — SIGNIFICANT CHANGE UP (ref 42.2–75.2)
NITRITE UR-MCNC: NEGATIVE — SIGNIFICANT CHANGE UP
NRBC # BLD: 0 /100 WBCS — SIGNIFICANT CHANGE UP (ref 0–0)
OSMOLALITY SERPL: 250 MOS/KG — LOW (ref 280–301)
OSMOLALITY UR: 55 MOS/KG — SIGNIFICANT CHANGE UP (ref 50–1200)
OSMOLALITY UR: 55 MOS/KG — SIGNIFICANT CHANGE UP (ref 50–1200)
PCO2 BLDV: 42 MMHG — SIGNIFICANT CHANGE UP (ref 42–55)
PH BLDV: 7.44 — HIGH (ref 7.32–7.43)
PH UR: 7.5 — SIGNIFICANT CHANGE UP (ref 5–8)
PHOSPHATE SERPL-MCNC: 3.3 MG/DL — SIGNIFICANT CHANGE UP (ref 2.1–4.9)
PLATELET # BLD AUTO: 194 K/UL — SIGNIFICANT CHANGE UP (ref 130–400)
PO2 BLDV: 43 MMHG — SIGNIFICANT CHANGE UP
POTASSIUM BLDV-SCNC: 3.1 MMOL/L — LOW (ref 3.5–5.1)
POTASSIUM SERPL-MCNC: 3.2 MMOL/L — LOW (ref 3.5–5)
POTASSIUM SERPL-SCNC: 3.2 MMOL/L — LOW (ref 3.5–5)
PROT SERPL-MCNC: 6.4 G/DL — SIGNIFICANT CHANGE UP (ref 6–8)
PROT UR-MCNC: NEGATIVE — SIGNIFICANT CHANGE UP
RAPID RVP RESULT: SIGNIFICANT CHANGE UP
RBC # BLD: 4.5 M/UL — LOW (ref 4.7–6.1)
RBC # FLD: 13.5 % — SIGNIFICANT CHANGE UP (ref 11.5–14.5)
SAO2 % BLDV: 77.2 % — SIGNIFICANT CHANGE UP
SARS-COV-2 RNA SPEC QL NAA+PROBE: SIGNIFICANT CHANGE UP
SODIUM SERPL-SCNC: 122 MMOL/L — LOW (ref 135–146)
SODIUM UR-SCNC: 23 MMOL/L — SIGNIFICANT CHANGE UP
SODIUM UR-SCNC: 23 MMOL/L — SIGNIFICANT CHANGE UP
SP GR SPEC: 1 — LOW (ref 1.01–1.03)
UROBILINOGEN FLD QL: SIGNIFICANT CHANGE UP
VALPROATE SERPL-MCNC: 80 UG/ML — SIGNIFICANT CHANGE UP (ref 50–100)
WBC # BLD: 7.87 K/UL — SIGNIFICANT CHANGE UP (ref 4.8–10.8)
WBC # FLD AUTO: 7.87 K/UL — SIGNIFICANT CHANGE UP (ref 4.8–10.8)

## 2022-04-12 PROCEDURE — 93010 ELECTROCARDIOGRAM REPORT: CPT

## 2022-04-12 PROCEDURE — 71045 X-RAY EXAM CHEST 1 VIEW: CPT | Mod: 26

## 2022-04-12 PROCEDURE — 99223 1ST HOSP IP/OBS HIGH 75: CPT

## 2022-04-12 PROCEDURE — 99285 EMERGENCY DEPT VISIT HI MDM: CPT | Mod: GC

## 2022-04-12 RX ORDER — HALOPERIDOL DECANOATE 100 MG/ML
5 INJECTION INTRAMUSCULAR AT BEDTIME
Refills: 0 | Status: DISCONTINUED | OUTPATIENT
Start: 2022-04-12 | End: 2022-04-12

## 2022-04-12 RX ORDER — HALOPERIDOL DECANOATE 100 MG/ML
0 INJECTION INTRAMUSCULAR
Qty: 0 | Refills: 0 | DISCHARGE

## 2022-04-12 RX ORDER — AMLODIPINE BESYLATE 2.5 MG/1
10 TABLET ORAL AT BEDTIME
Refills: 0 | Status: DISCONTINUED | OUTPATIENT
Start: 2022-04-12 | End: 2022-04-15

## 2022-04-12 RX ORDER — DIVALPROEX SODIUM 500 MG/1
500 TABLET, DELAYED RELEASE ORAL
Refills: 0 | Status: DISCONTINUED | OUTPATIENT
Start: 2022-04-12 | End: 2022-04-15

## 2022-04-12 RX ORDER — ENOXAPARIN SODIUM 100 MG/ML
40 INJECTION SUBCUTANEOUS EVERY 24 HOURS
Refills: 0 | Status: DISCONTINUED | OUTPATIENT
Start: 2022-04-12 | End: 2022-04-15

## 2022-04-12 RX ORDER — SODIUM CHLORIDE 9 MG/ML
1000 INJECTION INTRAMUSCULAR; INTRAVENOUS; SUBCUTANEOUS ONCE
Refills: 0 | Status: COMPLETED | OUTPATIENT
Start: 2022-04-12 | End: 2022-04-12

## 2022-04-12 RX ORDER — BENZTROPINE MESYLATE 1 MG
1 TABLET ORAL
Refills: 0 | Status: DISCONTINUED | OUTPATIENT
Start: 2022-04-12 | End: 2022-04-14

## 2022-04-12 RX ORDER — ASPIRIN/CALCIUM CARB/MAGNESIUM 324 MG
81 TABLET ORAL DAILY
Refills: 0 | Status: DISCONTINUED | OUTPATIENT
Start: 2022-04-12 | End: 2022-04-15

## 2022-04-12 RX ORDER — LABETALOL HCL 100 MG
100 TABLET ORAL
Refills: 0 | Status: DISCONTINUED | OUTPATIENT
Start: 2022-04-12 | End: 2022-04-15

## 2022-04-12 RX ORDER — ERGOCALCIFEROL 1.25 MG/1
1 CAPSULE ORAL
Qty: 0 | Refills: 0 | DISCHARGE

## 2022-04-12 RX ORDER — POTASSIUM CHLORIDE 20 MEQ
20 PACKET (EA) ORAL ONCE
Refills: 0 | Status: COMPLETED | OUTPATIENT
Start: 2022-04-12 | End: 2022-04-12

## 2022-04-12 RX ORDER — ERGOCALCIFEROL 1.25 MG/1
50000 CAPSULE ORAL
Refills: 0 | Status: DISCONTINUED | OUTPATIENT
Start: 2022-04-12 | End: 2022-04-15

## 2022-04-12 RX ORDER — HALOPERIDOL DECANOATE 100 MG/ML
20 INJECTION INTRAMUSCULAR AT BEDTIME
Refills: 0 | Status: DISCONTINUED | OUTPATIENT
Start: 2022-04-12 | End: 2022-04-15

## 2022-04-12 RX ORDER — ATORVASTATIN CALCIUM 80 MG/1
40 TABLET, FILM COATED ORAL DAILY
Refills: 0 | Status: DISCONTINUED | OUTPATIENT
Start: 2022-04-12 | End: 2022-04-15

## 2022-04-12 RX ORDER — MAGNESIUM SULFATE 500 MG/ML
2 VIAL (ML) INJECTION ONCE
Refills: 0 | Status: COMPLETED | OUTPATIENT
Start: 2022-04-12 | End: 2022-04-12

## 2022-04-12 RX ORDER — HALOPERIDOL DECANOATE 100 MG/ML
1 INJECTION INTRAMUSCULAR
Qty: 0 | Refills: 0 | DISCHARGE

## 2022-04-12 RX ORDER — LABETALOL HCL 100 MG
1 TABLET ORAL
Qty: 0 | Refills: 0 | DISCHARGE

## 2022-04-12 RX ORDER — HALOPERIDOL DECANOATE 100 MG/ML
5 INJECTION INTRAMUSCULAR DAILY
Refills: 0 | Status: DISCONTINUED | OUTPATIENT
Start: 2022-04-12 | End: 2022-04-14

## 2022-04-12 RX ADMIN — Medication 25 GRAM(S): at 17:35

## 2022-04-12 RX ADMIN — SODIUM CHLORIDE 1000 MILLILITER(S): 9 INJECTION INTRAMUSCULAR; INTRAVENOUS; SUBCUTANEOUS at 17:35

## 2022-04-12 RX ADMIN — Medication 50 MILLIEQUIVALENT(S): at 17:35

## 2022-04-12 NOTE — ED PROVIDER NOTE - CARE PLAN
Principal Discharge DX:	Pain in leg, unspecified   1 Principal Discharge DX:	Hyponatremia  Secondary Diagnosis:	Hypomagnesemia  Secondary Diagnosis:	Hypokalemia

## 2022-04-12 NOTE — PATIENT PROFILE ADULT - HOW PATIENT ADDRESSED, PROFILE
Alert-The patient is alert, awake and responds to voice. The patient is oriented to time, place, and person. The triage nurse is able to obtain subjective information. by first name

## 2022-04-12 NOTE — ED ADULT TRIAGE NOTE - CHIEF COMPLAINT QUOTE
patient reports lower back pain. denies injury - denies numbness tingling or incontinence- denies urinary complaints- ambulatory for ems

## 2022-04-12 NOTE — PATIENT PROFILE ADULT - FALL HARM RISK - RISK INTERVENTIONS
Assistance OOB with selected safe patient handling equipment/Assistance with ambulation/Communicate Fall Risk and Risk Factors to all staff, patient, and family/Discuss with provider need for PT consult/Monitor gait and stability/Reinforce activity limits and safety measures with patient and family/Visual Cue: Yellow wristband/Bed in lowest position, wheels locked, appropriate side rails in place/Call bell, personal items and telephone in reach/Instruct patient to call for assistance before getting out of bed or chair/Non-slip footwear when patient is out of bed/[place] to call system/Physically safe environment - no spills, clutter or unnecessary equipment/Purposeful Proactive Rounding/Room/bathroom lighting operational, light cord in reach

## 2022-04-12 NOTE — ED PROVIDER NOTE - OBJECTIVE STATEMENT
60 y/o M w PMHx of HTN, Parkinsons dz, Bipolar disorder, presents to ED w back pain and b/l LE pain and stiffness x 1 month, worsening this past week. As per nursing at [facility], pts parkinsons disorder has been progressively worsening w shuffling gait. Pt has upcoming Neuro appt this summer. Pt takes Buspirone 15mg, Haldol 50 mg IM q 4 weeks and 20mg and 5 mg PO, Benztropine 1 mg BID, and divalproex 500 mg BID. Denies f/c, cp, sob, n/v/d, abd pain. 60 y/o M w PMHx of HTN, Parkinsons dz, Bipolar disorder, presents to ED w back pain and b/l LE pain and stiffness x 1 month, worsening this past week. As per nursing at [facility] (spoke on phone), pts parkinsons disorder has been progressively worsening w shuffling gait. Pt has upcoming Neuro appt this summer. Pt takes Buspirone 15mg, Haldol 50 mg IM q 4 weeks and 20mg and 5 mg PO, Benztropine 1 mg BID, and divalproex 500 mg BID. Denies f/c, cp, sob, n/v/d, abd pain.

## 2022-04-12 NOTE — ED PROVIDER NOTE - NS ED ROS FT
Review of Systems    Constitutional: (-) fever  Cardiovascular: (-) chest pain, (-) syncope  Respiratory: (-) cough, (-) shortness of breath  Gastrointestinal: (-) vomiting, (-) diarrhea, (-) abdominal pain  Musculoskeletal: (-) neck pain, + back pain, + LE pain and stiffness  Integumentary: (-) rash, (-) edema  Neurological: (-) headache, (-) altered mental status    Except as documented in the HPI, all other systems are negative.

## 2022-04-12 NOTE — H&P ADULT - HISTORY OF PRESENT ILLNESS
Mr. Simon  61-year-old male past medical history of, Parkinson bipolar hypertension hyperlipidemia,  chronic hyponatremia secondary to psychogenic polydipsia, long h/o mental illness, presents with 1 month of progressively worsening stiffness to lower back and lower legs.  Staff at [facility] patient has had progressive decline in terms of his Parkinson disease.  No fall.  No numbness weakness tingling.  No back pain.  No bowel or bladder incontinence or retention.  No fever cough URI symptoms.  No chest pain shortness of breath.  No abdominal pain nausea vomiting diarrhea.    In the ED, vitals were BP: 159/62 HR: 55 RR: 20 Spo2 98% on RA, afebrile. Labs significant for Na 122, K 3.2, mag 1.6. UA negative. Patient given NS 1L bolus, Mag 2 mg IV and K 20 mEq in ED. Patient currently admitted to medicine for electrolyte abn, progression of parkinson's disease.

## 2022-04-12 NOTE — H&P ADULT - NSHPPHYSICALEXAM_GEN_ALL_CORE
PHYSICAL EXAM:  GENERAL: NAD, lying in bed comfortably  HEAD:  Atraumatic, Normocephalic  ENT: dry mucous membranes  NECK: Supple, No JVD  CHEST/LUNG: Clear to auscultation bilaterally  HEART: Regular rate and rhythm; No murmurs, rubs, or gallops  ABDOMEN: Soft, Nontender, Nondistended.  EXTREMITIES:  2+ Peripheral Pulses, brisk capillary refill. No clubbing, cyanosis, or edema  NERVOUS SYSTEM	EXT: Normal ROM x4. + tremors b/l hands, Alert, oriented. Moves all extremities spontaneously, no clonus, no rigidity

## 2022-04-12 NOTE — H&P ADULT - NSHPSOCIALHISTORY_GEN_ALL_CORE
Denies tobacco, etoh or illicit drug use Substance Use (street drugs): ( x ) never used  (  ) other:  Tobacco Usage:  ( x  ) never smoked   (   ) former smoker   (   ) current smoker  (     ) pack year  Alcohol Usage: None

## 2022-04-12 NOTE — ED PROVIDER NOTE - ATTENDING CONTRIBUTION TO CARE
61-year-old male past medical history of, Parkinson bipolar hypertension hyperlipidemia, presents with 1 month of progressively worsening stiffness to lower back and lower legs.  Staff at [facility] patient has had progressive decline in terms of his Parkinson disease.  No fall.  No numbness weakness tingling.  No back pain.  No bowel or bladder incontinence or retention.  No fever cough URI symptoms.  No chest pain shortness of breath.  No abdominal pain nausea vomiting diarrhea.    On exam, AFVSS, Well appearing, No acute distress, NCAT, EOMI, PERRLA, MMM, Neck supple, LCTAB, RRR nl s1s2 No mrg, Abdomen Soft NTND, aaox3, CN 2-12 intact, No nystagmus.  5/5 motor x 4 ext, SILT x 4 extremities, No facial droop or slurred speech. No pronator drift.  No stiffness in extremities noted, normal rapid alternating movement and finger nose finger bilaterally. No midline C/T/L tenderness to palpation or step off. Normal gait, No ataxia.  2+ DP pulses bilateral lower extremities, no LE edema or calf TTP, odd affect    A/P; "stiffness" in extremities, neurovascularly intact, will do labs check electrolytes, check Depakote level, EKG chest x-ray, RVP, reassess

## 2022-04-12 NOTE — ED PROVIDER NOTE - PHYSICAL EXAMINATION
VITAL SIGNS: I have reviewed nursing notes and confirm.  CONSTITUTIONAL: non-toxic, + unkempt   SKIN: no rash, no petechiae.  EYES: pink conjunctiva, anicteric  NECK: Supple  CARD: RRR, no murmurs, equal radial pulses bilaterally 2+  RESP: CTAB, no respiratory distress  ABD: Soft, non-tender, non-distended  EXT: Normal ROM x4. No edema. No calves tenderness  NEURO: + tremors b/l hands, Alert, oriented. Moves all extremities spontaneously, no clonus, no rigidity

## 2022-04-12 NOTE — ED ADULT NURSE NOTE - NSIMPLEMENTINTERV_GEN_ALL_ED
Implemented All Fall with Harm Risk Interventions:  [place] to call system. Call bell, personal items and telephone within reach. Instruct patient to call for assistance. Room bathroom lighting operational. Non-slip footwear when patient is off stretcher. Physically safe environment: no spills, clutter or unnecessary equipment. Stretcher in lowest position, wheels locked, appropriate side rails in place. Provide visual cue, wrist band, yellow gown, etc. Monitor gait and stability. Monitor for mental status changes and reorient to person, place, and time. Review medications for side effects contributing to fall risk. Reinforce activity limits and safety measures with patient and family. Provide visual clues: red socks.

## 2022-04-12 NOTE — H&P ADULT - NSHPLABSRESULTS_GEN_ALL_CORE
<<<<<LABS>>>>>                        12.1   7.87  )-----------( 194      ( 2022 16:15 )             33.8         122<L>  |  86<L>  |  4<L>  ----------------------------<  99  3.2<L>   |  25  |  0.7    Ca    9.3      2022 16:15  Phos  3.3       Mg     1.6         TPro  6.4  /  Alb  4.0  /  TBili  0.6  /  DBili  x   /  AST  10  /  ALT  <5  /  AlkPhos  74  -12      Urinalysis Basic - ( 2022 17:09 )    Color: Colorless / Appearance: Clear / S.005 / pH: x  Gluc: x / Ketone: Negative  / Bili: Negative / Urobili: <2 mg/dL   Blood: x / Protein: Negative / Nitrite: Negative   Leuk Esterase: Negative / RBC: x / WBC x   Sq Epi: x / Non Sq Epi: x / Bacteria: x        Creatine Kinase, Serum: 188 U/L (22 @ 16:15)    [id number]  CARDIAC MARKERS ( 2022 16:15 )  x     / x     / 188 U/L / x     / x

## 2022-04-12 NOTE — H&P ADULT - ATTENDING COMMENTS
61 yr old male with hx of Schizoaffective disorder, hypertension hyperlipidemia, chronic hyponatremia secondary to psychogenic polydipsia presents with 1 month of progressively worsening stiffness to lower back and lower legs and tremors.     # Euvolemic Hyponatremia  # Hypomagnesemia   # Hypokalemia   - check urine studies   - encourage solute intake, protein  -  PO water restriction  - replete Mg to 2, K to 4 as needed  - monitor bmp     # HTN  - c/w amlodipine, losartan and labetalol     # Ambulate as tolerated  - fall precaution     # DASH diet    # DVT Ppx  - start Lovenox     # Rest as above     # Full code GENERAL: NAD, resting tremor   HEAD:  Atraumatic, Normocephalic  EYES: EOMI, Sclera White   NECK: Supple, No JVD  CHEST/LUNG: clear b/l breath sounds; No wheezing, rhonchi, or crackles  HEART: Regular rate and rhythm; s1, s2, No murmurs, rubs, or gallops  ABDOMEN: Soft, Nontender, Nondistended; Bowel sounds present, No rebound or guarding noted   EXTREMITIES:  No lower extremity edema or calf tenderness to palpation.  No clubbing or cyanosis  PSYCH: AAOx3,  NEUROLOGY: CN intact, 5/5 strength in all extremities, Sensation grossly intact. (+) cogwheel rigidity   SKIN: No rashes or lesions      61 yr old male with hx of Schizoaffective disorder, hypertension hyperlipidemia, chronic hyponatremia secondary to psychogenic polydipsia presents with 1 month of progressively worsening stiffness to lower back and lower legs and tremors.     #progressive stiffness/tremors concerning for progression of parkinsonism   - home meds: Buspirone 15mg, Haldol 50 mg IM q 4 weeks and 20mg and 5 mg PO, Benztropine 1 mg BID, and divalproex 1000 mg at bedtime   - f/u neurology recommendations  - PT consult    # Progression of PD's disease vs drug induced PD (haldol)  # Euvolemic Hyponatremia  # Hypomagnesemia   # Hypokalemia   - c/w benztropine  - check urine studies   - encourage solute intake, protein  - PO water restriction  - replete Mg to 2, K to 4 as needed  - monitor bmp   - neurology consult for PD drug optimization     # HTN  - c/w amlodipine, losartan and labetalol     # Ambulate as tolerated  - fall precaution     # DASH diet    # DVT Ppx  - start Lovenox     # Rest as above     # Full code    **Pt seen on 04/12/22

## 2022-04-12 NOTE — ED PROVIDER NOTE - CLINICAL SUMMARY MEDICAL DECISION MAKING FREE TEXT BOX
Patient found to have multiple electrolyte abnormalities including hyponatremia hypokalemia hypomag, prolonged QT on EKG, repleted, admit to medicine

## 2022-04-12 NOTE — H&P ADULT - ASSESSMENT
Mr. Simon  61-year-old male past medical history of, Parkinson bipolar hypertension hyperlipidemia,  chronic hyponatremia secondary to psychogenic polydipsia, long h/o mental illness, presents with 1 month of progressively worsening stiffness to lower back and lower legs.      #progressive stiffness of lower back and leg concerning for progression of parkinsonism   - f/u neurology recommendations  - PT consult    #hyponatremia acute on chronic most likely 2/2  psychogenic polydipsia vs medication use  - c/w with fluid restriction  - f/u urine lytes   - f/u nephrology recommendations    #hypokalemia  - s/p 20 meq K in Ed  - f/u repeat bmp and replete as needed     #Schizoaffective disorder  - c/w buspirone, haloperidol  - consider psych consult for acute worsening psychiatric symptoms over admission (currently normal affect)    #Misc  - DVT Prophylaxis: Lovenox subQ  - GI Prophylaxis: not indicated  - Diet: DASH/TLC  - Activity: PT/OT eval  - Code Status: full code               Mr. Simon  61-year-old male past medical history of, Parkinson bipolar hypertension hyperlipidemia,  chronic hyponatremia secondary to psychogenic polydipsia, long h/o mental illness, presents with 1 month of progressively worsening stiffness to lower back and lower legs.      #progressive stiffness of lower back and leg concerning for progression of parkinsonism   - f/u neurology recommendations  - PT consult    #hyponatremia acute on chronic most likely 2/2  psychogenic polydipsia vs medication use  - c/w with fluid restriction  - f/u urine lytes   - f/u tsh and cortisol levl  - f/u nephrology recommendations    #hypokalemia  - s/p 20 meq K in Ed  - f/u repeat bmp and replete as needed     #Schizoaffective disorder  - c/w buspirone, haloperidol  - consider psych consult for acute worsening psychiatric symptoms over admission (currently normal affect)  - monitor QT prolongation  - Monitor EKG     #Misc  - DVT Prophylaxis: Lovenox subQ  - GI Prophylaxis: not indicated  - Diet: DASH/TLC  - Activity: PT/OT eval  - Code Status: full code               Mr. Simon  61-year-old male past medical history of, Parkinson bipolar hypertension hyperlipidemia,  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     #progressive stiffness/tremors concerning for progression of parkinsonism   - home meds: Buspirone 15mg, Haldol 50 mg IM q 4 weeks and 20mg and 5 mg PO, Benztropine 1 mg BID, and divalproex 1000 mg at bedtime   - f/u neurology recommendations  - PT consult    #hyponatremia acute on chronic most likely 2/2  psychogenic polydipsia vs medication use  - c/w with fluid restriction  - trend bmp  - f/u urine lytes   - f/u tsh and cortisol   - f/u nephrology recommendations    #hypokalemia  - s/p 20 meq K in Ed  - f/u repeat bmp and replete as needed     #Schizoaffective disorder  - home meds: Buspirone 15mg, Haldol 50 mg IM q 4 weeks and 20mg and 5 mg PO, Benztropine 1 mg BID, and divalproex 1000 mg at bedtime   - consider psych consult for acute worsening psychiatric symptoms over admission (currently normal affect)  - monitor QT prolongation  - Monitor EKG     #Misc  - DVT Prophylaxis: Lovenox subQ  - GI Prophylaxis: not indicated  - Diet: DASH/TLC  - Activity: PT/OT eval  - Code Status: full code

## 2022-04-12 NOTE — ED ADULT NURSE NOTE - HOW OFTEN DO YOU HAVE A DRINK CONTAINING ALCOHOL?
Never I personally performed the service described in the documentation recorded by the scribe in my presence, and it accurately and completely records my words and actions.

## 2022-04-12 NOTE — ED ADULT NURSE NOTE - OBJECTIVE STATEMENT
61 year old male complaining of b/l leg pain and "tremors" "for the past few weeks/months." Pt denies recent illness, fever, fall, trauma.

## 2022-04-13 DIAGNOSIS — M54.50 LOW BACK PAIN, UNSPECIFIED: ICD-10-CM

## 2022-04-13 DIAGNOSIS — F25.9 SCHIZOAFFECTIVE DISORDER, UNSPECIFIED: ICD-10-CM

## 2022-04-13 LAB
ALBUMIN SERPL ELPH-MCNC: 4.3 G/DL — SIGNIFICANT CHANGE UP (ref 3.5–5.2)
ALP SERPL-CCNC: 79 U/L — SIGNIFICANT CHANGE UP (ref 30–115)
ALT FLD-CCNC: <5 U/L — SIGNIFICANT CHANGE UP (ref 0–41)
ANION GAP SERPL CALC-SCNC: 11 MMOL/L — SIGNIFICANT CHANGE UP (ref 7–14)
ANION GAP SERPL CALC-SCNC: 12 MMOL/L — SIGNIFICANT CHANGE UP (ref 7–14)
AST SERPL-CCNC: 9 U/L — SIGNIFICANT CHANGE UP (ref 0–41)
BASOPHILS # BLD AUTO: 0.05 K/UL — SIGNIFICANT CHANGE UP (ref 0–0.2)
BASOPHILS NFR BLD AUTO: 0.7 % — SIGNIFICANT CHANGE UP (ref 0–1)
BILIRUB SERPL-MCNC: 0.5 MG/DL — SIGNIFICANT CHANGE UP (ref 0.2–1.2)
BUN SERPL-MCNC: 4 MG/DL — LOW (ref 10–20)
BUN SERPL-MCNC: 5 MG/DL — LOW (ref 10–20)
CALCIUM SERPL-MCNC: 9.5 MG/DL — SIGNIFICANT CHANGE UP (ref 8.5–10.1)
CALCIUM SERPL-MCNC: 9.6 MG/DL — SIGNIFICANT CHANGE UP (ref 8.5–10.1)
CHLORIDE SERPL-SCNC: 99 MMOL/L — SIGNIFICANT CHANGE UP (ref 98–110)
CHLORIDE SERPL-SCNC: 99 MMOL/L — SIGNIFICANT CHANGE UP (ref 98–110)
CO2 SERPL-SCNC: 25 MMOL/L — SIGNIFICANT CHANGE UP (ref 17–32)
CO2 SERPL-SCNC: 26 MMOL/L — SIGNIFICANT CHANGE UP (ref 17–32)
CREAT SERPL-MCNC: 0.6 MG/DL — LOW (ref 0.7–1.5)
CREAT SERPL-MCNC: 0.6 MG/DL — LOW (ref 0.7–1.5)
CULTURE RESULTS: SIGNIFICANT CHANGE UP
EGFR: 111 ML/MIN/1.73M2 — SIGNIFICANT CHANGE UP
EGFR: 111 ML/MIN/1.73M2 — SIGNIFICANT CHANGE UP
EOSINOPHIL # BLD AUTO: 0.08 K/UL — SIGNIFICANT CHANGE UP (ref 0–0.7)
EOSINOPHIL NFR BLD AUTO: 1.2 % — SIGNIFICANT CHANGE UP (ref 0–8)
GLUCOSE SERPL-MCNC: 87 MG/DL — SIGNIFICANT CHANGE UP (ref 70–99)
GLUCOSE SERPL-MCNC: 99 MG/DL — SIGNIFICANT CHANGE UP (ref 70–99)
HCT VFR BLD CALC: 36.9 % — LOW (ref 42–52)
HGB BLD-MCNC: 13 G/DL — LOW (ref 14–18)
IMM GRANULOCYTES NFR BLD AUTO: 0.3 % — SIGNIFICANT CHANGE UP (ref 0.1–0.3)
LYMPHOCYTES # BLD AUTO: 1.81 K/UL — SIGNIFICANT CHANGE UP (ref 1.2–3.4)
LYMPHOCYTES # BLD AUTO: 26.2 % — SIGNIFICANT CHANGE UP (ref 20.5–51.1)
MAGNESIUM SERPL-MCNC: 2.1 MG/DL — SIGNIFICANT CHANGE UP (ref 1.8–2.4)
MCHC RBC-ENTMCNC: 27 PG — SIGNIFICANT CHANGE UP (ref 27–31)
MCHC RBC-ENTMCNC: 35.2 G/DL — SIGNIFICANT CHANGE UP (ref 32–37)
MCV RBC AUTO: 76.7 FL — LOW (ref 80–94)
MONOCYTES # BLD AUTO: 0.79 K/UL — HIGH (ref 0.1–0.6)
MONOCYTES NFR BLD AUTO: 11.4 % — HIGH (ref 1.7–9.3)
NEUTROPHILS # BLD AUTO: 4.15 K/UL — SIGNIFICANT CHANGE UP (ref 1.4–6.5)
NEUTROPHILS NFR BLD AUTO: 60.2 % — SIGNIFICANT CHANGE UP (ref 42.2–75.2)
NRBC # BLD: 0 /100 WBCS — SIGNIFICANT CHANGE UP (ref 0–0)
OSMOLALITY SERPL: 283 MOS/KG — SIGNIFICANT CHANGE UP (ref 280–301)
PLATELET # BLD AUTO: 199 K/UL — SIGNIFICANT CHANGE UP (ref 130–400)
POTASSIUM SERPL-MCNC: 3.2 MMOL/L — LOW (ref 3.5–5)
POTASSIUM SERPL-MCNC: 3.5 MMOL/L — SIGNIFICANT CHANGE UP (ref 3.5–5)
POTASSIUM SERPL-SCNC: 3.2 MMOL/L — LOW (ref 3.5–5)
POTASSIUM SERPL-SCNC: 3.5 MMOL/L — SIGNIFICANT CHANGE UP (ref 3.5–5)
PROT SERPL-MCNC: 6.6 G/DL — SIGNIFICANT CHANGE UP (ref 6–8)
RBC # BLD: 4.81 M/UL — SIGNIFICANT CHANGE UP (ref 4.7–6.1)
RBC # FLD: 13.9 % — SIGNIFICANT CHANGE UP (ref 11.5–14.5)
SODIUM SERPL-SCNC: 135 MMOL/L — SIGNIFICANT CHANGE UP (ref 135–146)
SODIUM SERPL-SCNC: 137 MMOL/L — SIGNIFICANT CHANGE UP (ref 135–146)
SPECIMEN SOURCE: SIGNIFICANT CHANGE UP
TSH SERPL-MCNC: 2.04 UIU/ML — SIGNIFICANT CHANGE UP (ref 0.27–4.2)
WBC # BLD: 6.9 K/UL — SIGNIFICANT CHANGE UP (ref 4.8–10.8)
WBC # FLD AUTO: 6.9 K/UL — SIGNIFICANT CHANGE UP (ref 4.8–10.8)

## 2022-04-13 PROCEDURE — 99221 1ST HOSP IP/OBS SF/LOW 40: CPT

## 2022-04-13 PROCEDURE — 93010 ELECTROCARDIOGRAM REPORT: CPT

## 2022-04-13 PROCEDURE — 99222 1ST HOSP IP/OBS MODERATE 55: CPT

## 2022-04-13 PROCEDURE — 99233 SBSQ HOSP IP/OBS HIGH 50: CPT

## 2022-04-13 RX ORDER — POTASSIUM CHLORIDE 20 MEQ
20 PACKET (EA) ORAL
Refills: 0 | Status: COMPLETED | OUTPATIENT
Start: 2022-04-13 | End: 2022-04-13

## 2022-04-13 RX ORDER — NICOTINE POLACRILEX 2 MG
1 GUM BUCCAL DAILY
Refills: 0 | Status: DISCONTINUED | OUTPATIENT
Start: 2022-04-13 | End: 2022-04-15

## 2022-04-13 RX ADMIN — HALOPERIDOL DECANOATE 5 MILLIGRAM(S): 100 INJECTION INTRAMUSCULAR at 11:31

## 2022-04-13 RX ADMIN — Medication 50 MILLIEQUIVALENT(S): at 21:20

## 2022-04-13 RX ADMIN — Medication 81 MILLIGRAM(S): at 11:31

## 2022-04-13 RX ADMIN — Medication 100 MILLIGRAM(S): at 05:28

## 2022-04-13 RX ADMIN — HALOPERIDOL DECANOATE 20 MILLIGRAM(S): 100 INJECTION INTRAMUSCULAR at 21:19

## 2022-04-13 RX ADMIN — Medication 15 MILLIGRAM(S): at 05:28

## 2022-04-13 RX ADMIN — AMLODIPINE BESYLATE 10 MILLIGRAM(S): 2.5 TABLET ORAL at 21:19

## 2022-04-13 RX ADMIN — Medication 50 MILLIEQUIVALENT(S): at 15:57

## 2022-04-13 RX ADMIN — Medication 15 MILLIGRAM(S): at 17:14

## 2022-04-13 RX ADMIN — Medication 50 MILLIEQUIVALENT(S): at 17:55

## 2022-04-13 RX ADMIN — Medication 1 PATCH: at 11:30

## 2022-04-13 RX ADMIN — Medication 1 MILLIGRAM(S): at 17:14

## 2022-04-13 RX ADMIN — Medication 1 MILLIGRAM(S): at 05:27

## 2022-04-13 RX ADMIN — DIVALPROEX SODIUM 500 MILLIGRAM(S): 500 TABLET, DELAYED RELEASE ORAL at 05:28

## 2022-04-13 RX ADMIN — Medication 100 MILLIGRAM(S): at 17:15

## 2022-04-13 RX ADMIN — ERGOCALCIFEROL 50000 UNIT(S): 1.25 CAPSULE ORAL at 11:31

## 2022-04-13 RX ADMIN — ATORVASTATIN CALCIUM 40 MILLIGRAM(S): 80 TABLET, FILM COATED ORAL at 11:31

## 2022-04-13 RX ADMIN — ENOXAPARIN SODIUM 40 MILLIGRAM(S): 100 INJECTION SUBCUTANEOUS at 11:30

## 2022-04-13 RX ADMIN — DIVALPROEX SODIUM 500 MILLIGRAM(S): 500 TABLET, DELAYED RELEASE ORAL at 17:15

## 2022-04-13 NOTE — CONSULT NOTE ADULT - ATTENDING COMMENTS
I was Physically Present for the key portions of the evaluation   I agree with the above History  , Physical examination Assessment and plan   I have Reviewed , Modified or appended where appropriate.  Please check A and P as above.    # Euvolemic hyponatremia, low urine osm, low urine Na/ psychogenic polydipsia   - sodium level correcting s/p iv fluid bolus/ pt endorses drinking less since admission  - d/w pt, has limited insight of medical conditions, will need very close monitoring of Na level and fluid intake once discharged  - restrict fluid intake to 1.5L daily, monitor urine output

## 2022-04-13 NOTE — BH CONSULTATION LIAISON ASSESSMENT NOTE - NSBHCHARTREVIEWVS_PSY_A_CORE FT
Vital Signs Last 24 Hrs  T(C): 36.3 (13 Apr 2022 11:36), Max: 36.9 (13 Apr 2022 05:00)  T(F): 97.4 (13 Apr 2022 11:36), Max: 98.5 (13 Apr 2022 05:00)  HR: 75 (13 Apr 2022 11:36) (75 - 77)  BP: 131/75 (13 Apr 2022 11:36) (131/75 - 175/92)  BP(mean): --  RR: 18 (13 Apr 2022 11:36) (18 - 18)  SpO2: 94% (12 Apr 2022 23:35) (94% - 94%)

## 2022-04-13 NOTE — CONSULT NOTE ADULT - SUBJECTIVE AND OBJECTIVE BOX
CC: Progression of parkinson's      HPI:  61-year-old male past medical history of, Parkinson's disease bipolar disease HTN, HLD, chronic hyponatremia secondary to psychogenic polydipsia, long h/o mental illness, presents with 1 month of progressively worsening stiffness to lower back and lower legs.  Staff at [facility] patient has had progressive decline in terms of his Parkinson disease. Denies fall/trauma, paresthesias, extremity weakness, back pain, radicular pain b/b incontinence or retention.  Labs significant for Na 122, K 3.2, mag 1.6. UA negative. Patient given NS 1L bolus, Mag 2 mg IV and K 20 mEq in ED. Patient currently admitted to medicine for electrolyte abn, progression of parkinson's disease.             Social History  Active 1ppd smoker  Denies alcohol or drug use      Home Medications:  Amlodipine 10 mg oral tablet: 1 tab(s) orally once a day (at bedtime) (15 Jul 2021 14:37)  Aspirin 81 mg oral delayed release tablet: 1 tab(s) orally once a day (15 Jul 2021 12:57)  Benztropine 1 mg oral tablet: 1 tab(s) orally 2 times a day (12 Apr 2022 23:03)  Buspirone 15 mg oral tablet: 1 tab(s) orally 2 times a day (15 Jul 2021 14:37)  Divalproex sodium 500 mg oral delayed release tablet: 1 tab(s) orally 2 times a day (15 Jul 2021 14:38)  Haloperidol 20 mg oral tablet: 1 tab(s) orally once a day (at bedtime) (12 Apr 2022 23:08)  Haloperidol 5 mg oral tablet: 1 tab(s) orally once a day (12 Apr 2022 23:08)  Haloperidol 5 mg/mL injectable solution:  (12 Apr 2022 23:03)  Labetalol 100 mg oral tablet: 1 tab(s) orally 2 times a day (12 Apr 2022 23:02)  Vitamin D2 1.25 mg (50,000 intl units) oral capsule: 1 cap(s) orally once a week (12 Apr 2022 23:02)        Neuro Exam:  Orientation: Patient is a/o to self & place. Following commands. Answering specific questions.  Cranial Nerves: visual acuity intact bilaterally, visual fields full to confrontation, pupils equal round and reactive to light, extraocular motion intact, facial sensation intact symmetrically, face symmetrical, hearing was intact bilaterally, tongue and palate midline, head turning and shoulder shrug symmetric and there was no tongue deviation with protrusion.   Motor: 5/5 throughout             UE cogwheeling and resting tremors             LE Normal muscle tone and bulk  DTR: Diminished in the lower extremities          2+ on the upper exts  Sensory exam: intact and symmetric  Coordination:. no obvious dysmetria or limb ataxia  Gait: Unable        Allergies    tetanus toxoid (Swelling)    Intolerances      MEDICATIONS  (STANDING):  amLODIPine   Tablet 10 milliGRAM(s) Oral at bedtime  aspirin enteric coated 81 milliGRAM(s) Oral daily  atorvastatin Oral Tab/Cap - Peds 40 milliGRAM(s) Oral daily  benztropine 1 milliGRAM(s) Oral two times a day  busPIRone 15 milliGRAM(s) Oral two times a day  diVALproex  milliGRAM(s) Oral two times a day  enoxaparin Injectable 40 milliGRAM(s) SubCutaneous every 24 hours  ergocalciferol 60303 Unit(s) Oral every week  haloperidol     Tablet 20 milliGRAM(s) Oral at bedtime  haloperidol     Tablet 5 milliGRAM(s) Oral daily  labetalol 100 milliGRAM(s) Oral two times a day        MEDICATIONS  (PRN):      LABS:                        12.1   7.87  )-----------( 194      ( 12 Apr 2022 16:15 )             33.8     04-12    122<L>  |  86<L>  |  4<L>  ----------------------------<  99  3.2<L>   |  25  |  0.7    Ca    9.3      12 Apr 2022 16:15  Phos  3.3     04-12  Mg     1.6     04-12    TPro  6.4  /  Alb  4.0  /  TBili  0.6  /  DBili  x   /  AST  10  /  ALT  <5  /  AlkPhos  74  04-12    Valproic Acid Level, Serum (04.12.22 @ 16:15)   Valproic Acid Level, Serum: 80.0 ug/mL           Neuro Imaging:  < from: MR Head No Cont (07.14.21 @ 22:37) >  IMPRESSION:    Motion limited examination.    There is no evidence of acute intracranial pathology. No evidence of acute infarct, intracranial hemorrhage or mass effect.    Mild chronic microvascular changes    Incidentally noted is a partially visualized well-circumscribed lesion within the left parotid gland which appears slightly increased in size in comparison to prior studies dating back to 10/1/2015. Further evaluation with MRI of the neck with and without contrast is recommended.    --- End of Report ---            JACKELYN PENA MD; Resident Radiologist  This document has been electronically signed.  SELVIN GRISSOM MD;Attending Radiologist  This document has been electronically signed. Jul 15 2021 11:00AM    < end of copied text >    EEG:     Echo:   Carotid Doppler: N/A  Telemetry:

## 2022-04-13 NOTE — MEDICAL STUDENT PROGRESS NOTE(EDUCATION) - SUBJECTIVE AND OBJECTIVE BOX
SAM [id number]    CHIEF COMPLAINT: "I'm fine"                                                      ______________________________________________________________  HISTORY OF PRESENT ILLNESS:  Patient is a 61y  Male, single, no children, domiciled in [facility] or [facility] [place] unemployed., with past medical history of hypertension and chronic hyponatremia, past psychiatric history of schizoaffective disorder and two inpatient psychiatric admissions to [facility] from 19 to 08/15/19, and 19 to 19, presenting to the ED for lower back pain. Psychiatric consult was for managing EPS symptoms.    Upon approach, patient was sleeping comfortably in his hospital bed. He was wearing a hospital gown and had the blankets pulled up to his chin. He had the bed flat. He was able to be woken after calling his name.  Patient is a poor historian and after a few minutes, abruptly said "I'm tired of these questions," so the interview was limited. Patient asked for mouth wash, which I got for him; tremors were observed when trying to open the cap and bring the bottle to his mouth. After that, he asked to go to the restroom.    Patient's main complaint is "tremors in [his] legs, spinal cord, and arm" that started" a few weeks ago." He explains these tremors "bother him," and it is "hard for [him] to wash" himself.  Patient denies any suicidal or homicidal ideations. He reports that he hasn't been sleeping well the past 2 nights, and that he "got a full nights sleep back in the residence." During the interview, a hospital employee gave him a food tray, but he adamantly refused it saying, "When can I order food? I don't like this." When asked about hearing voices, he say "don't we all;" he would not give further information, and kept repeating, "Don't we all hear voices that aren't there." He denies any feeling of paranoia, such as feeling watched or that someone is out to get him. Patient could not recall what his psychiatric diagnoses are or what medications he is on.                                                      ______________________________________________________________  MENTAL STATUS EXAM:  Patient is awake and alert, well orientated in time, place, and person. Speech is normal in rate, volume, quality, and quantity. Mood is described as "better." Affect is full ranged but anxious. Thought process is linear and goal oriented. Thought - denies suicidal ideations, intent, or plan. Perceptions - denies auditory and visual hallucinations. Insight and judgement in fair.                                                      ______________________________________________________________  VITALS:  T(C): 36.3 (22 @ 11:36), Max: 36.9 (22 @ 15:12)  HR: 75 (22 @ 11:36) (55 - 77)  BP: 131/75 (22 @ 11:36) (131/75 - 175/92)  RR: 18 (22 @ 11:36) (18 - 20)  SpO2: 94% (22 @ 23:35) (94% - 97%)                                                      ______________________________________________________________  LABS:                        13.0   6.90  )-----------( 199      ( 2022 05:19 )             36.9         137  |  99  |  5<L>  ----------------------------<  87  3.2<L>   |  26  |  0.6<L>    Ca    9.5      2022 05:19  Phos  3.3     04-12  Mg     2.1     04-13    TPro  6.6  /  Alb  4.3  /  TBili  0.5  /  DBili  x   /  AST  9   /  ALT  <5  /  AlkPhos  79  04-13      CARDIAC MARKERS ( 2022 16:15 )  x     / x     / 188 U/L / x     / x          Urinalysis Basic - ( 2022 17:09 )    Color: Colorless / Appearance: Clear / S.005 / pH: x  Gluc: x / Ketone: Negative  / Bili: Negative / Urobili: <2 mg/dL   Blood: x / Protein: Negative / Nitrite: Negative   Leuk Esterase: Negative / RBC: x / WBC x   Sq Epi: x / Non Sq Epi: x / Bacteria: x                                                      ______________________________________________________________  EKG:  < from: 12 Lead ECG (22 @ 01:44) >    Ventricular Rate 76 BPM    Atrial Rate 76 BPM    P-R Interval 170 ms    QRS Duration 106 ms    Q-T Interval 436 ms    QTC Calculation(Bazett) 490 ms    P Axis 20 degrees    R Axis -14 degrees    T Axis 37 degrees    Diagnosis Line Normal sinus rhythm  Septal infarct , age undetermined  Abnormal ECG    < end of copied text >                                                      ______________________________________________________________  PAST PSYCHIATRIC HISTORY:  Patient was unable to answer what psychiatric diagnoses he had. He does mention taking haldol and cogentin in the 1980's to 's, but then switching to Clonazepam and Zyprexa. He endorses having a psychiatrist in [facility]; Dr. Levy (sp?) and Dr. Murphy.     Per chart review, patient had 2 prior inpatient psychiatric admissions to [facility]: 19 to 08/15/19, and 19 to 19. On 19 he complained of  suicidal ideation, where he stated he had thoughts but no acute plan to cut his wrists because residents were bothering him where he live. He was discharged on 8/15/19 with the Principal Discharge Dx of Schizoaffective disorder, bipolar type. Upon discharge is was recommended he take the following psychiatric medications:    divalproex sodium 500 mg oral delayed release tablet  -- 1 tab(s) by mouth once a day (at bedtime) until told by MD to stop  -- Indication: For Schizoaffective disorder, bipolar type    divalproex sodium 500 mg oral delayed release tablet  -- 1 tab(s) by mouth once a day in morning until told by MD to stop  -- Indication: For Schizoaffective disorder, bipolar type    benztropine 0.5 mg oral tablet  -- 1 tab(s) by mouth 2 times a day until told by MD to stop  -- Indication: For Schizoaffective disorder, bipolar type    SEROquel 100 mg oral tablet  -- 1 tab(s) by mouth once a day until told by MD to stop  -- Indication: For Schizoaffective disorder, bipolar type    SEROquel 300 mg oral tablet  -- 1 tab(s) by mouth once (at bedtime) until told by md to stop  -- Indication: For Schizoaffective disorder, bipolar type    busPIRone 15 mg oral tablet  -- 1 tab(s) by mouth 2 times a day until told by md to stop  -- Indication: For Schizoaffective disorder, bipolar type      On 19 he presented to the ED with complaints of wanting to cut his wrist or stab himself. On 19, he was discharged with the Principal Discharge Diagnosis of Chronic schizoaffective disorder with acute exacerbation. Upon discharge is was recommended he take the following psychiatric medications:    divalproex sodium 500 mg oral delayed release tablet  -- 1 tab(s) by mouth 2 times a day until md stops  -- Indication: For Chronic schizoaffective disorder with acute exacerbation    haloperidol 5 mg oral tablet  -- 1 tab(s) by mouth once a day (at bedtime) until md stops  -- Indication: For Chronic schizoaffective disorder with acute exacerbation    QUEtiapine 400 mg oral tablet  -- 1 tab(s) by mouth once a day (at bedtime) until md stops  -- Indication: For Chronic schizoaffective disorder with acute exacerbation    busPIRone 15 mg oral tablet  -- 1 tab(s) by mouth 2 times a day until md stops  -- Indication: For Chronic schizoaffective disorder with acute exacerbation    He was also seen for a psychiatrist evaluation at [facility] on 2021 16:48, where psychiatry was consulted because expressed suicidal ideations upon discharge. In  he presented to the ED with thoughts of cutting self with scissors, and was hospitalised to ICU due to severe hyponatremia (sodium 111 in ED) and increased troponin.                                                    ______________________________________________________________  SUBSTANCE HISTORY:  Patient denies use of alcohol and other drugs. He endorses distant cigarette use, where he quit in .                                                    ______________________________________________________________  FAMILY HISTORY:  Father  of lung cancer; pt mentions father's alcohol use. Patient denies any other psychiatric diagnoses in his family. He also denies any history of suicide attempts in his family.                                                    ______________________________________________________________  PAST MEDICAL HISTORY:  ·	HTN (hypertension)  ·	Depression  ·	Hyperlipemia  ·	Schizophrenia  ·	Hyponatremia  ·	Hypomagnesemia  ·	Hypokalemia                                                      ______________________________________________________________  SOCIAL HISTORY:  Per patient, he grew up in [place]. Highest level of education was 4 years at a [employer]. He doesn't work because he became "mentally ill." He's currently living in [facility], and explains he lived there for 11 years.                                                    ______________________________________________________________  COLLATERAL:                                                      ______________________________________________________________  MEDICATION RECONCILIATION  SAM REA, 61y, Male, MRN#[id number]    CHIEF COMPLAINT: "I'm fine"                                                      ______________________________________________________________  HISTORY OF PRESENT ILLNESS:  Patient is a 61y  Male, single, no children, domiciled in [facility], unemployed., with past medical history of hypertension and chronic hyponatremia, past psychiatric history of schizoaffective disorder and two inpatient psychiatric admissions to [facility] from 19 to 08/15/19, and 19 to 19, presenting to the ED for lower back pain. Psychiatric consult was for managing EPS symptoms.    Upon approach, patient was sleeping comfortably in his hospital bed. He was wearing a hospital gown and had the blankets pulled up to his chin. He had the bed flat. He was able to be woken after calling his name.  Patient is a poor historian and after a few minutes, abruptly said "I'm tired of these questions," so the interview was limited. After I said goodbye, patient asked for mouth wash, which I got for him; tremors were observed when trying to open the cap and bring the bottle to his mouth.     Patient's main complaint is "tremors in [his] legs, spinal cord, and arm" that started" a few weeks ago." He explains these tremors "bother him," and it is "hard for [him] to wash" himself. Per chart review, his group home RN states that he complained of unsteady gait and recently needs more assistance with dressing." Patient denies any falls. Patient denies any suicidal or homicidal ideations. He reports that he hasn't been sleeping well the past 2 nights, and that he "got a full nights sleep back in the residence." During the interview, a hospital employee gave him a food tray, but he adamantly refused it saying, "When can I order food? I don't like this." He only ate the cake off the tray. When asked about hearing voices, he say "don't we all;" he would not give further information, and kept repeating, "Don't we all hear voices that aren't there." He denies any feeling of paranoia, such as feeling watched or that someone is out to get him. Patient could not recall what his psychiatric diagnoses are or what medications he is on.                                                      ______________________________________________________________  MENTAL STATUS EXAM:  Patient is awake, but a bit drowsy. He is orientated to self and place. Speech is decreased in rate, volume, quality, and quantity. Mood is described as "ok." Affect is limited. Thought process is linear and goal oriented. Thought - denies suicidal ideations, intent, or plan. Perceptions - endorses visual hallucinations. Insight and judgement in limited.                                                      ______________________________________________________________  VITALS:  T(C): 36.3 (22 @ 11:36), Max: 36.9 (22 @ 15:12)  HR: 75 (22 @ 11:36) (55 - 77)  BP: 131/75 (22 @ 11:36) (131/75 - 175/92)  RR: 18 (22 @ 11:36) (18 - 20)  SpO2: 94% (22 @ 23:35) (94% - 97%)                                                      ______________________________________________________________  LABS:                        13.0   6.90  )-----------( 199      ( 2022 05:19 )             36.9         137  |  99  |  5<L>  ----------------------------<  87  3.2<L>   |  26  |  0.6<L>    Ca    9.5      2022 05:19  Phos  3.3     04-  Mg     2.1         TPro  6.6  /  Alb  4.3  /  TBili  0.5  /  DBili  x   /  AST  9   /  ALT  <5  /  AlkPhos  79        CARDIAC MARKERS ( 2022 16:15 )  x     / x     / 188 U/L / x     / x          Urinalysis Basic - ( 2022 17:09 )    Color: Colorless / Appearance: Clear / S.005 / pH: x  Gluc: x / Ketone: Negative  / Bili: Negative / Urobili: <2 mg/dL   Blood: x / Protein: Negative / Nitrite: Negative   Leuk Esterase: Negative / RBC: x / WBC x   Sq Epi: x / Non Sq Epi: x / Bacteria: x                                                      ______________________________________________________________  EKG:  < from: 12 Lead ECG (22 @ 01:44) >    Ventricular Rate 76 BPM    Atrial Rate 76 BPM    P-R Interval 170 ms    QRS Duration 106 ms    Q-T Interval 436 ms    QTC Calculation(Bazett) 490 ms    P Axis 20 degrees    R Axis -14 degrees    T Axis 37 degrees    Diagnosis Line Normal sinus rhythm  Septal infarct , age undetermined  Abnormal ECG    < end of copied text >                                                      ______________________________________________________________  PAST PSYCHIATRIC HISTORY:  Patient was unable to answer what psychiatric diagnoses he'd be given. He does mention taking Haldol and Cogentin in the 's to 's, but then switching to Clonazepam and Zyprexa. He endorses having a psychiatrist in [facility]; Dr. Levy (sp?) and Dr. Murphy.     Per chart review, patient had 2 prior inpatient psychiatric admissions to [facility]: 19 to 08/15/19, and 19 to 19. On 19 he complained of suicidal ideation of cutting his wrists because residents at the nursing home were bothering him. He was admitted to [facility]. He was discharged on 8/15/19 with the Principal Discharge Dx of Schizoaffective disorder, bipolar type. Upon discharge is was recommended he take the following psychiatric medications:    divalproex sodium 500 mg oral delayed release tablet  -- 1 tab(s) by mouth once a day (at bedtime) until told by MD to stop  -- Indication: For Schizoaffective disorder, bipolar type    divalproex sodium 500 mg oral delayed release tablet  -- 1 tab(s) by mouth once a day in morning until told by MD to stop  -- Indication: For Schizoaffective disorder, bipolar type    benztropine 0.5 mg oral tablet  -- 1 tab(s) by mouth 2 times a day until told by MD to stop  -- Indication: For Schizoaffective disorder, bipolar type    SEROquel 100 mg oral tablet  -- 1 tab(s) by mouth once a day until told by MD to stop  -- Indication: For Schizoaffective disorder, bipolar type    SEROquel 300 mg oral tablet  -- 1 tab(s) by mouth once (at bedtime) until told by md to stop  -- Indication: For Schizoaffective disorder, bipolar type    busPIRone 15 mg oral tablet  -- 1 tab(s) by mouth 2 times a day until told by md to stop  -- Indication: For Schizoaffective disorder, bipolar type      On 19 he presented to the [facility] ED  with complaints of wanting to cut his wrist or stab himself. He was admitted to [facility]. On 19, he was discharged with the Principal Discharge Diagnosis of Chronic schizoaffective disorder with acute exacerbation. Upon discharge is was recommended he take the following psychiatric medications:    divalproex sodium 500 mg oral delayed release tablet  -- 1 tab(s) by mouth 2 times a day until md stops  -- Indication: For Chronic schizoaffective disorder with acute exacerbation    haloperidol 5 mg oral tablet  -- 1 tab(s) by mouth once a day (at bedtime) until md stops  -- Indication: For Chronic schizoaffective disorder with acute exacerbation    QUEtiapine 400 mg oral tablet  -- 1 tab(s) by mouth once a day (at bedtime) until md stops  -- Indication: For Chronic schizoaffective disorder with acute exacerbation    busPIRone 15 mg oral tablet  -- 1 tab(s) by mouth 2 times a day until md stops  -- Indication: For Chronic schizoaffective disorder with acute exacerbation    He was also seen for a psychiatric evaluation at [facility] on 2021 16:48, where psychiatry was consulted because he expressed suicidal ideations upon discharge. In  he presented to the ED with thoughts of cutting self with scissors, and was hospitalised to ICU due to severe hyponatremia (sodium 111 in ED) and increased troponin.                                                    ______________________________________________________________  SUBSTANCE HISTORY:  Patient denies use of alcohol and other drugs. He endorses distant cigarette use, where he quit in .                                                    ______________________________________________________________  FAMILY HISTORY:  Father  of lung cancer; pt mentions father's alcohol use. Patient denies any other psychiatric diagnoses in his family. He also denies any history of suicide attempts in his family.                                                    ______________________________________________________________  PAST MEDICAL HISTORY:  ·	HTN (hypertension)  ·	Depression  ·	Hyperlipemia  ·	Schizophrenia  ·	Hyponatremia  ·	Hypomagnesemia  ·	Hypokalemia                                                      ______________________________________________________________  SOCIAL HISTORY:  Per patient, he grew up in [place]. 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 He is unemployed because he became "mentally ill." He's currently living in [facility], and explains he lived there for 11 years.                                                    ______________________________________________________________  COLLATERAL:    [facility]- [place] [phone]    PCP-Dr. Wilmer Glez    Psychiatrist - Dr. Cueva    Emergency Contact Araina Rea, mother, [phone]    Emergency Contact: Ulises Rea, brother, [phone]

## 2022-04-13 NOTE — BH CONSULTATION LIAISON ASSESSMENT NOTE - RISK ASSESSMENT
Risk factors include: hx of schizoaffective disorder, prior suicidal ideation, current symptoms of tremors and low back pain  Protective factors: stable residence in nursing home, insight into psychiatric diagnosis

## 2022-04-13 NOTE — PROGRESS NOTE ADULT - SUBJECTIVE AND OBJECTIVE BOX
SUBJECTIVE:    Patient is a 61y old Male who presents with a chief complaint of back pain (2022 11:33)    Currently admitted to medicine with the primary diagnosis of Hyponatremia       Today is hospital day 1d. This morning he is resting comfortably in bed and reports no new issues or overnight events.     PAST MEDICAL & SURGICAL HISTORY  HTN (hypertension)    Depression    Hyperlipemia    Schizophrenia    No significant past surgical history      SOCIAL HISTORY:  Negative for smoking/alcohol/drug use.     ALLERGIES:  tetanus toxoid (Swelling)    MEDICATIONS:  STANDING MEDICATIONS  amLODIPine   Tablet 10 milliGRAM(s) Oral at bedtime  aspirin enteric coated 81 milliGRAM(s) Oral daily  atorvastatin Oral Tab/Cap - Peds 40 milliGRAM(s) Oral daily  benztropine 1 milliGRAM(s) Oral two times a day  busPIRone 15 milliGRAM(s) Oral two times a day  diVALproex  milliGRAM(s) Oral two times a day  enoxaparin Injectable 40 milliGRAM(s) SubCutaneous every 24 hours  ergocalciferol 77474 Unit(s) Oral every week  haloperidol     Tablet 20 milliGRAM(s) Oral at bedtime  haloperidol     Tablet 5 milliGRAM(s) Oral daily  labetalol 100 milliGRAM(s) Oral two times a day  nicotine -  14 mG/24Hr(s) Patch 1 patch Transdermal daily    PRN MEDICATIONS    VITALS:   T(F): 97.4  HR: 75  BP: 131/75  RR: 18  SpO2: 94%    LABS:                        13.0   6.90  )-----------( 199      ( 2022 05:19 )             36.9     04-13    137  |  99  |  5<L>  ----------------------------<  87  3.2<L>   |  26  |  0.6<L>    Ca    9.5      2022 05:19  Phos  3.3     04-12  Mg     2.1     04-13    TPro  6.6  /  Alb  4.3  /  TBili  0.5  /  DBili  x   /  AST  9   /  ALT  <5  /  AlkPhos  79  04-13      Urinalysis Basic - ( 2022 17:09 )    Color: Colorless / Appearance: Clear / S.005 / pH: x  Gluc: x / Ketone: Negative  / Bili: Negative / Urobili: <2 mg/dL   Blood: x / Protein: Negative / Nitrite: Negative   Leuk Esterase: Negative / RBC: x / WBC x   Sq Epi: x / Non Sq Epi: x / Bacteria: x        Creatine Kinase, Serum: 188 U/L (22 @ 16:15)      CARDIAC MARKERS ( 2022 16:15 )  x     / x     / 188 U/L / x     / x          RADIOLOGY:    PHYSICAL EXAM:  GEN: No acute distress  LUNGS: Clear to auscultation bilaterally   HEART: Regular  ABD: Soft, non-tender, non-distended.  EXT: NC/NC/NE/2+PP/BIRD/Skin Intact.   NEURO: AAOX3

## 2022-04-13 NOTE — BH CONSULTATION LIAISON ASSESSMENT NOTE - CURRENT MEDICATION
MEDICATIONS  (STANDING):  amLODIPine   Tablet 10 milliGRAM(s) Oral at bedtime  aspirin enteric coated 81 milliGRAM(s) Oral daily  atorvastatin Oral Tab/Cap - Peds 40 milliGRAM(s) Oral daily  benztropine 1 milliGRAM(s) Oral two times a day  busPIRone 15 milliGRAM(s) Oral two times a day  diVALproex  milliGRAM(s) Oral two times a day  enoxaparin Injectable 40 milliGRAM(s) SubCutaneous every 24 hours  ergocalciferol 90624 Unit(s) Oral every week  haloperidol     Tablet 20 milliGRAM(s) Oral at bedtime  haloperidol     Tablet 5 milliGRAM(s) Oral daily  labetalol 100 milliGRAM(s) Oral two times a day  nicotine -  14 mG/24Hr(s) Patch 1 patch Transdermal daily  potassium chloride  20 mEq/100 mL IVPB 20 milliEquivalent(s) IV Intermittent every 2 hours    MEDICATIONS  (PRN):

## 2022-04-13 NOTE — BH CONSULTATION LIAISON ASSESSMENT NOTE - HPI (INCLUDE ILLNESS QUALITY, SEVERITY, DURATION, TIMING, CONTEXT, MODIFYING FACTORS, ASSOCIATED SIGNS AND SYMPTOMS)
Patient is a 62yo  male, single, no children, domiciled in [facility], unemployed, with past medical history of hypertension and chronic hyponatremia, past psychiatric history of schizoaffective disorder and two inpatient psychiatric admissions to [facility] (07/17/19 - 08/15/19 and 08/08/19 - 09/06/19), presenting to the ED for lower back pain. Psychiatry consulted for management of EPS symptoms.    Upon approach, patient was sleeping comfortably in his hospital bed. He was wearing a hospital gown and had the blankets pulled up to his chin. He had the bed flat. He was able to be woken after calling his name.  Patient is a poor historian and after a few minutes, abruptly said "I'm tired of these questions," so the interview was limited. Notably, he has b/l UE tremors.     Patient's main complaint is "tremors in [his] legs, spinal cord, and arm" that started "a few weeks ago." He explains that these tremors bother him and it is hard for him to wash himself. Per chart review, his group home RN states that he complained of unsteady gait and has recently needed more assistance with dressing." Patient denies any falls. Patient denies any suicidal or homicidal ideations. He reports that he hasn't been sleeping well the past few nights, and that he. During the interview, a hospital employee gave him a food tray, but he adamantly refused it saying, "When can I order food? I don't like this." He only ate the cake off the tray. When asked about hearing voices, he replied "don't we all", but would not give further information, and kept repeating, "don't we all hear voices that aren't there." He denies any feeling of paranoia, such as feeling watched or that someone is out to get him. Patient could not recall what his psychiatric diagnoses are or what medications he is on, but he insisted that he could not stop taking his medications because he would "go berzerk".

## 2022-04-13 NOTE — PROGRESS NOTE ADULT - SUBJECTIVE AND OBJECTIVE BOX
SAM REA  61y, Male  Allergy: tetanus toxoid (Swelling)    Hospital Day: 1d    Patient seen and examined. No acute events overnight    PMH/PSH:  PAST MEDICAL & SURGICAL HISTORY:  HTN (hypertension)    Depression    Hyperlipemia    Schizophrenia    No significant past surgical history        VITALS:  T(F): 97.4 (22 @ 11:36), Max: 98.5 (22 @ 05:00)  HR: 75 (22 @ 11:36)  BP: 131/75 (22 @ 11:36) (131/75 - 175/92)  RR: 18 (22 @ 11:36)  SpO2: 94% (22 @ 23:35)    TESTS & MEASUREMENTS:  Weight (Kg): 117.9 (22 @ 15:12)  BMI (kg/m2): 39.5 ()    22 @ 07:01  -  22 @ 07:00  --------------------------------------------------------  IN: 160 mL / OUT: 200 mL / NET: -40 mL                            13.0   6.90  )-----------( 199      ( 2022 05:19 )             36.9           137  |  99  |  5<L>  ----------------------------<  87  3.2<L>   |  26  |  0.6<L>    Ca    9.5      2022 05:19  Phos  3.3       Mg     2.1         TPro  6.6  /  Alb  4.3  /  TBili  0.5  /  DBili  x   /  AST  9   /  ALT  <5  /  AlkPhos  79  13    LIVER FUNCTIONS - ( 2022 05:19 )  Alb: 4.3 g/dL / Pro: 6.6 g/dL / ALK PHOS: 79 U/L / ALT: <5 U/L / AST: 9 U/L / GGT: x           CARDIAC MARKERS ( 2022 16:15 )  x     / x     / 188 U/L / x     / x            Urinalysis Basic - ( 2022 17:09 )    Color: Colorless / Appearance: Clear / S.005 / pH: x  Gluc: x / Ketone: Negative  / Bili: Negative / Urobili: <2 mg/dL   Blood: x / Protein: Negative / Nitrite: Negative   Leuk Esterase: Negative / RBC: x / WBC x   Sq Epi: x / Non Sq Epi: x / Bacteria: x        RADIOLOGY & ADDITIONAL TESTS:    RECENT DIAGNOSTIC ORDERS:  Cortisol AM, Serum: 08:00 (22 @ 23:10)  Urea Nitrogen,  Random Urine: Routine (22 @ 22:27)  Total Protein, Random Urine: Routine (22 @ 22:27)  Osmolality, Random Urine: Routine (22 @ 22:27)  Sodium, Random Urine: Routine (22 @ 22:27)  Protein/Creatinine Ratio, Urine: Routine (22 @ 22:27)  Diet, DASH/TLC:   Sodium & Cholesterol Restricted  800mL Fluid Restriction ([id number]) (22 @ 22:25)  Osmolality, Serum: STAT (22 @ 18:35)  Culture - Urine: Routine  Specimen Source: Clean Catch (Midstream)  Addl Info: If indwelling urinary catheter > 14 days, obtain an order to remove and replace prior to c (22 @ 17:08)      MEDICATIONS:  MEDICATIONS  (STANDING):  amLODIPine   Tablet 10 milliGRAM(s) Oral at bedtime  aspirin enteric coated 81 milliGRAM(s) Oral daily  atorvastatin Oral Tab/Cap - Peds 40 milliGRAM(s) Oral daily  benztropine 1 milliGRAM(s) Oral two times a day  busPIRone 15 milliGRAM(s) Oral two times a day  diVALproex  milliGRAM(s) Oral two times a day  enoxaparin Injectable 40 milliGRAM(s) SubCutaneous every 24 hours  ergocalciferol 68721 Unit(s) Oral every week  haloperidol     Tablet 20 milliGRAM(s) Oral at bedtime  haloperidol     Tablet 5 milliGRAM(s) Oral daily  labetalol 100 milliGRAM(s) Oral two times a day  nicotine -  14 mG/24Hr(s) Patch 1 patch Transdermal daily    MEDICATIONS  (PRN):      HOME MEDICATIONS:  amLODIPine 10 mg oral tablet (07-15)  aspirin 81 mg oral delayed release tablet (07-15)  benztropine 1 mg oral tablet ()  busPIRone 15 mg oral tablet (07-15)  divalproex sodium 500 mg oral delayed release tablet (07-15)  haloperidol 20 mg oral tablet ()  haloperidol 5 mg oral tablet ()  haloperidol 5 mg/mL injectable solution ()  labetalol 100 mg oral tablet ()  Vitamin D2 1.25 mg (50,000 intl units) oral capsule ()      REVIEW OF SYSTEMS:  All other review of systems is negative unless indicated above.     PHYSICAL EXAM:  PHYSICAL EXAM:  GENERAL: NAD  HEAD:  Atraumatic, Normocephalic  NECK: Supple, No JVD  CHEST/LUNG: Clear to auscultation bilaterally; No wheeze  HEART: Regular rate and rhythm; No murmurs, rubs, or gallops  ABDOMEN: Soft, Nontender, Nondistended; Bowel sounds present  EXTREMITIES:  2+ Peripheral Pulses, No clubbing, cyanosis, or edema  SKIN: No rashes or lesions

## 2022-04-13 NOTE — CONSULT NOTE ADULT - NS PANP COMMENT GEN_ALL_CORE FT
I have personally seen and examined this patient on 4/13.  I have fully participated in the care of this patient.  I have reviewed all pertinent clinical information, including history, physical exam, plan and note. Patient with history of Bipolar disease on PO Haldol presents with worsening shaking and low back pain. On exam patient has mild bradykinesia in the left, bilateral rigidity and resting tremor in the right side. Gait is not shuffling. Exam is suggestive of parkinsonism which is most likely in the setting of extrapyramidal effect of Haldol. Patient is already on Cogentin but recommend to switch Haldol to different antipsychotic under supervision on physiatry.  Also recommend outpatient FRANCISCO JAVIER scan after discharge to r/o primary Parkinsonism.   I have reviewed all pertinent clinical information and reviewed all relevant imaging and diagnostic studies personally.  Recommendations as above.  Agree with above assessment except as noted.

## 2022-04-13 NOTE — BH CONSULTATION LIAISON ASSESSMENT NOTE - OTHER PAST PSYCHIATRIC HISTORY (INCLUDE DETAILS REGARDING ONSET, COURSE OF ILLNESS, INPATIENT/OUTPATIENT TREATMENT)
He does mention taking Haldol and Cogentin in the 1980's to 1990's, but then switching to Clonazepam and Zyprexa. He endorses having a psychiatrist in [facility]; Dr. Levy (sp?) and Dr. Murphy.     Per chart review, patient had 2 prior inpatient psychiatric admissions to [facility]: 07/17/19 to 08/15/19, and 08/08/19 to 09/06/19. On 7/17/19 he complained of suicidal ideation of cutting his wrists because residents at the nursing home were bothering him. He was admitted to [facility]. He was discharged on 8/15/19 with the Principal Discharge Dx of Schizoaffective disorder, bipolar type. Upon discharge is was recommended he take the following psychiatric medications:    divalproex sodium 500 mg oral delayed release tablet  -- 1 tab(s) by mouth once a day (at bedtime) until told by MD to stop  -- Indication: For Schizoaffective disorder, bipolar type    divalproex sodium 500 mg oral delayed release tablet  -- 1 tab(s) by mouth once a day in morning until told by MD to stop  -- Indication: For Schizoaffective disorder, bipolar type    benztropine 0.5 mg oral tablet  -- 1 tab(s) by mouth 2 times a day until told by MD to stop  -- Indication: For Schizoaffective disorder, bipolar type    SEROquel 100 mg oral tablet  -- 1 tab(s) by mouth once a day until told by MD to stop  -- Indication: For Schizoaffective disorder, bipolar type    SEROquel 300 mg oral tablet  -- 1 tab(s) by mouth once (at bedtime) until told by md to stop  -- Indication: For Schizoaffective disorder, bipolar type    busPIRone 15 mg oral tablet  -- 1 tab(s) by mouth 2 times a day until told by md to stop  -- Indication: For Schizoaffective disorder, bipolar type      On 08/08/19 he presented to the [facility] ED  with complaints of wanting to cut his wrist or stab himself. He was admitted to [facility]. On 09/06/19, he was discharged with the Principal Discharge Diagnosis of Chronic schizoaffective disorder with acute exacerbation. Upon discharge is was recommended he take the following psychiatric medications:    divalproex sodium 500 mg oral delayed release tablet  -- 1 tab(s) by mouth 2 times a day until md stops  -- Indication: For Chronic schizoaffective disorder with acute exacerbation    haloperidol 5 mg oral tablet  -- 1 tab(s) by mouth once a day (at bedtime) until md stops  -- Indication: For Chronic schizoaffective disorder with acute exacerbation    QUEtiapine 400 mg oral tablet  -- 1 tab(s) by mouth once a day (at bedtime) until md stops  -- Indication: For Chronic schizoaffective disorder with acute exacerbation    busPIRone 15 mg oral tablet  -- 1 tab(s) by mouth 2 times a day until md stops  -- Indication: For Chronic schizoaffective disorder with acute exacerbation    He was also seen for a psychiatric evaluation at [facility] on 7/8/2021 16:48, where psychiatry was consulted because he expressed suicidal ideations upon discharge. In 11/31/21 he presented to the ED with thoughts of cutting self with scissors, and was hospitalised to ICU due to severe hyponatremia (sodium 111 in ED) and increased troponin.

## 2022-04-13 NOTE — BH CONSULTATION LIAISON ASSESSMENT NOTE - CASE SUMMARY
Mr Simon is a 61 year old man with a history of Schizoaffective disorder who was admitted to the medical floor for the evaluation and possible management of lower back and leg pain and numbness.   Psychiatric consult was called for the medication management of worsening extrapyramidal symptoms of antipsychotics particularly worsening tremors and rigidity. Patient is not clear about the onset of his symptoms however he seems very distressed by his tremors but more because of his back pain and reported lower limb weakness. Although distressing , the parkinsonism symptoms are most likely chronic given the history obtained of chronic psychotic illness.  The risk of an acute decompensation of patient's psychiatric illness far outweighs the benefit of discontinuing the haldol given the possible chronicity of the parkinsonian symptoms. In addition, patient does not appear to have any desire to discontinue the medication.  For now , patient does not acutely psychotic , manic or depressed. He denies having suicidal ideations, intent or plan. It is unclear if his limited responses at time is secondary to the neurodegenerative sequelae of his chronic psychotic illness versus the parkinsonism.   At this time, patient is not considered an imminent danger to himself or others and he does not need inpatient psychiatric hospitalization. Since it appears that patient has been psychiatrically stable for a long time on this current dose of Haldol, we do not recommend reducing the dose or discontinuing patient's haldol for now . Instead, we recommend contacting patient's outpatient psychiatrist or primary medical doctor to confirm the chronicity of the parkinsonism observed in patient, the medications used in the past to manage the parkinsonism and the different outcomes , particularly the effect of increasing Cogentin if this has been tried in the past , the benefit of adding medications such as Amantadine and Selegiline. For now , we recommend continued support by staff, addressing patient's back and lower limb pain , considering possible MRI of the spine. We also recommend delirium precautions as per nursing in addition to behavioral interventions and environmental modifications that will help patient's orientation and help him feel safe. The  psychiatry team will follow.

## 2022-04-13 NOTE — BH CONSULTATION LIAISON ASSESSMENT NOTE - DESCRIPTION
Patient denies use of alcohol and other drugs. He endorses distant cigarette use, where he quit in 1994.

## 2022-04-13 NOTE — BH CONSULTATION LIAISON ASSESSMENT NOTE - NSBHCONSULTRECOMMENDOTHER_PSY_A_CORE FT
Delirium precautions as per nursing   Behavioral interventions and environmental modifications to help patient feel safe .

## 2022-04-13 NOTE — BH CONSULTATION LIAISON ASSESSMENT NOTE - OTHER
Sometimes unwilling to answer questions When asked, patient replied "don't we all hear voices that aren't there."

## 2022-04-13 NOTE — BH CONSULTATION LIAISON ASSESSMENT NOTE - SUMMARY
Patient is a 60yo  male, single, no children, domiciled in [facility], unemployed, with past medical history of hypertension and chronic hyponatremia, past psychiatric history of schizoaffective disorder and two inpatient psychiatric admissions to [facility] (07/17/19 - 08/15/19 and 08/08/19 - 09/06/19), presenting to the ED for lower back pain. Psychiatry consulted for management of EPS symptoms.    Impression  - Patient presents with b/l upper extremity resting tremors, rigidity, and bradykinesia in the setting of chronic Haldol use for known Schizoaffective Disorder. He has no pre-existing diagnosis of Parkinson's Disease and has never been prescribed any dopaminergic agonists.    Plan  - Continue with Haldol for now  - Will consider increasing Cogentin vs decreasing current Haldol dose after speaking with outpatient PCP/Psychiatrist (Dr. Laz Peck [phone])  - Consider CT vs MRI of L-spine as patient complains of new lower back pain Patient is a 60yo  male, single, no children, domiciled in [facility], unemployed, with past medical history of hypertension and chronic hyponatremia, past psychiatric history of schizoaffective disorder and two inpatient psychiatric admissions to [facility] (07/17/19 - 08/15/19 and 08/08/19 - 09/06/19), presenting to the ED for lower back pain. Psychiatry consulted for management of EPS symptoms.  Patient was observed to have symptoms suggestive of parkinsonism. it is however unclear if patient has a primary parkinson's disease versus drug indice parkinsonism. However , given the chronicity of his illness it is most likely that this a medication induced.   We however do not recommend discontinuing the haldol without contacting patient's outpatient psychiatrist because of the risk of the an acute decompensation of his schizoaffective disorder .Patient may benefit from increasing the cogetin or adding other medications that will target parkinsonism like Amantadine. This descion should be made in concert with the patient and family.     Impression  - Patient presents with b/l upper extremity resting tremors, rigidity, and bradykinesia in the setting of chronic Haldol use for known Schizoaffective Disorder. He has no pre-existing diagnosis of Parkinson's Disease and has never been prescribed any dopaminergic agonists.    Plan  - Continue with Haldol for now  - Obtain collateral information from outpatient PCP/Psychiatrist (Dr. Laz Peck [phone])  - Consider CT vs MRI of L-spine as patient complains of new lower back pain

## 2022-04-13 NOTE — PROGRESS NOTE ADULT - ASSESSMENT
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     #Progressive stiffness/tremors concerning for progression of parkinsonism vs drug induced  - home meds: Buspirone 15mg, Haldol 50 mg IM q 4 weeks and 20mg and 5 mg PO, Benztropine 1 mg BID, and divalproex 1000 mg at bedtime   - f/u neurology recommendations  - PT consult    # Euvolemic Hyponatremia  # Hypomagnesemia, resolved  # Hypokalemia   - c/w benztropine  - monitor bmp , replete PRN  - neurology consult for PD drug optimization     # HTN  - c/w amlodipine, losartan and labetalol     # Ambulate as tolerated  - fall precaution     # DASH diet    DVT PPX, Lovenox    #Progress Note Handoff  Pending (specify): neuro f/u, PT Eval  Family discussion: d/w family regarding neuro eval  Disposition: Home   61-year-old male past medical history of, Bipolar, hypertension hyperlipidemia, 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     #Progressive stiffness/tremors concerning for worsening parkinsonism due to psych meds  - home meds: Buspirone 15mg, Haldol 50 mg IM q 4 weeks and 20mg and 5 mg PO, Benztropine 1 mg BID, and divalproex 1000 mg at bedtime   - f/u psychiatry recommendations  - PT consult    # Euvolemic Hyponatremia  # Hypomagnesemia, resolved  # Hypokalemia   - c/w benztropine  - monitor bmp , replete PRN    # HTN  - c/w amlodipine, losartan and labetalol     # Ambulate as tolerated  - fall precaution     # DASH diet    DVT PPX, Lovenox    #Progress Note Handoff  Pending (specify) psych eval  Family discussion: d/w pt regarding psych eval  Disposition: Home   61-year-old male past medical history of, Schizoaffective disorder with previous [facility] admission, hypertension hyperlipidemia, 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     #Progressive stiffness/tremors concerning for worsening parkinsonism due to psych meds  #Hx schizoaffective disorder  - home meds: Buspirone 15mg, Haldol 50 mg IM q 4 weeks and 20mg and 5 mg PO, Benztropine 1 mg BID, and divalproex 1000 mg at bedtime   - f/u psychiatry recommendations  - PT consult    # Euvolemic Hyponatremia  # Hypomagnesemia, resolved  # Hypokalemia   - c/w benztropine  - monitor bmp , replete PRN    # HTN  - c/w amlodipine, losartan and labetalol     # Ambulate as tolerated  - fall precaution     # DASH diet    DVT PPX, Lovenox    #Progress Note Handoff  Pending (specify) psych eval for med dosing  Family discussion: d/w pt regarding psych eval  Disposition: Home

## 2022-04-13 NOTE — CONSULT NOTE ADULT - ASSESSMENT
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  Staff at [facility] reports progressive decline in terms of his Parkinson disease. Denies fall/trauma, paresthesias, extremity weakness, back pain, radicular pain b/b incontinence or retention .  Patient has b/l ue resting tremors and cogwheeling, no exaggerated dtrs noted.         Labs  On arrival the Labs significant for Na 122, K 3.2, mag 1.6.  (Mag 2 mg IV and K 20 mEq in ED)   UA negative.   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  Staff at [facility] reports progressive decline in terms of his Parkinson disease.  Patient has b/l ue resting tremors and cogwheeling, no exaggerated dtrs noted.         Labs  On arrival the Labs significant for Na 122, K 3.2, mag 1.6.  (Mag 2 mg IV and K 20 mEq in ED)   UA negative.   Depakote level 80ug/ml  Secondary parkinsonism due to medication side effect.     Plan:  Continue Benztropine 1mg q bid  Correct electrolytes  Recommend psychiatry consult to change Haldol to different antipsychotic agent with less extrapyramidal side effect   PT for lower back pain

## 2022-04-13 NOTE — PROGRESS NOTE ADULT - ASSESSMENT
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     # Progressive stiffness/tremors concerning for progression of parkinsonism   - c/w home meds: Buspirone 15mg, Haldol 50 mg IM q 4 weeks and 20mg and 5 mg PO, Benztropine 1 mg BID, and divalproex 1000 mg at bedtime   - f/u final neuro recs   - PT follow up    # Hyponatremia acute on chronic most likely 2/2 psychogenic polydipsia vs medication use  - c/w with fluid restriction  - trend bmp  - urine Na 23, osm 55, serum osm 283  - tsh 2.04  - f/u nephrology     # Schizoaffective disorder  - home meds: Buspirone 15mg, Haldol 50 mg IM q 4 weeks and 20mg and 5 mg PO, Benztropine 1 mg BID, and divalproex 1000 mg at bedtime   - consider psych consult for acute worsening psychiatric symptoms over admission (currently normal affect)  - monitor QT prolongation  - Monitor EKG     #Misc  - DVT Prophylaxis: Lovenox subQ  - GI Prophylaxis: not indicated  - Diet: DASH/TLC  - Activity: PT/OT eval  - Code Status: full code               61-year-old male past medical history of, Parkinson bipolar hypertension hyperlipidemia,  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     # Progressive stiffness/tremors concerning for progression of parkinsonism   - c/w home meds: Buspirone 15mg, Haldol 50 mg IM q 4 weeks and 20mg and 5 mg PO, Benztropine 1 mg BID, and divalproex 1000 mg at bedtime   - f/u final neuro recs   - PT follow up  - unclear if pt was ever diagnosed with Parkinson's (unlikely),tremors likely side effects from meds, psych eval for meds adjustment    # Hyponatremia acute on chronic most likely 2/2 psychogenic polydipsia vs medication use  - c/w with fluid restriction  - trend bmp  - urine Na 23, osm 55, serum osm 283  - tsh 2.04  - f/u nephrology     # Schizoaffective disorder  - home meds: Buspirone 15mg, Haldol 50 mg IM q 4 weeks and 20mg and 5 mg PO, Benztropine 1 mg BID, and divalproex 1000 mg at bedtime    (currently normal affect)  - monitor QT prolongation  - Monitor EKG     #Misc  - DVT Prophylaxis: Lovenox subQ  - GI Prophylaxis: not indicated  - Diet: DASH/TLC  - Activity: PT/OT eval  - Code Status: full code

## 2022-04-13 NOTE — CONSULT NOTE ADULT - ASSESSMENT
61-year-old male past medical history of, Parkinson bipolar hypertension hyperlipidemia,  chronic hyponatremia secondary to psychogenic polydipsia, long h/o mental illness, presents with 1 month of progressively worsening stiffness to lower back and lower legs.    # Euvolemic hyponatremia, hypo-osmolar  # H/O Psychogenic Polydipsia  # H/O Chronic Hyponatremia  - s/p 1L NS in ED  - Na on admission 122 and now 137  - TSH 2, Urine Osmolarity 285, Urine Na 23 after NS bolus  - Hold any further hydration  - encourage solute intake  - Repeat BMP q12  - replete Mg to 2, K to 4 as needed   61-year-old male past medical history of, Parkinson bipolar hypertension hyperlipidemia, chronic hyponatremia secondary to psychogenic polydipsia, long h/o mental illness, presents with 1 month of progressively worsening stiffness to lower back and lower legs.    Patients labs consistent with excessive free water intake. Patient admits to drinking "a lot of sodas and water" and says he feels thirsty all the time. Currently Sodium has been corrected but would avoid further aggressive correction. Patient drinking and eating now, will recommend liberal fluid intake for today and recommend fluid restriction to 1.5L or less starting tomorrow.    # Euvolemic hypo-osmolar hyponatremia  # H/O Chronic Hyponatremia 2/2 Psychogenic Polydipsia  - s/p 1L NS in ED  - Na on admission 122 and now 137  - TSH 2, Urine Osmolarity 55, Urine Na 23  - Hold any further hydration  - Would restrict fluid intake  - encourage solute intake  - Repeat BMP q12  - replete Mg to 2, K to 4 as needed  - Would recommend discharge note to have instructions to limit fluid intake to less than 1.5L a day for the patient   61-year-old male past medical history of, Parkinson bipolar hypertension hyperlipidemia, chronic hyponatremia secondary to psychogenic polydipsia, long h/o mental illness, presents with 1 month of progressively worsening stiffness to lower back and lower legs.    Patients labs consistent with excessive free water intake. Patient admits to drinking "a lot of sodas and water" and says he feels thirsty all the time. Currently Sodium has been corrected but would avoid further aggressive correction. Patient drinking and eating now, will recommend liberal fluid intake for today and recommend fluid restriction to 1.5L or less starting tomorrow.    # Euvolemic hypo-osmolar hyponatremia  # H/O Chronic Hyponatremia 2/2 Psychogenic Polydipsia  - s/p 1L NS in ED  - Na on admission 122 and now 137  - TSH 2, Urine Osmolarity 55, Urine Na 23  - Hold any further hydration  - Would restrict fluid intake  - encourage solute intake  - Repeat BMP q12  - replete Mg to 2, K to 4 as needed  - Would recommend discharge note to have instructions to limit fluid intake to less than 1.5L a day for the patient    # HTN  - Can resume Labetalol and ACE/ARB  - Would avoid thiazides (used to be on HCTZ)

## 2022-04-13 NOTE — BH CONSULTATION LIAISON ASSESSMENT NOTE - NSBHCONSULTFOLLOWAFTERCARE_PSY_A_CORE FT
Upon discharge, patient should follow up with his outpatient psychiatrist or PCP at [facility]  for continued medication management .

## 2022-04-13 NOTE — CONSULT NOTE ADULT - SUBJECTIVE AND OBJECTIVE BOX
HPI:  Mr. Simon  61-year-old male past medical history of, Parkinson bipolar hypertension hyperlipidemia,  chronic hyponatremia secondary to psychogenic polydipsia, long h/o mental illness, presents with 1 month of progressively worsening stiffness to lower back and lower legs.  Staff at [facility] states patient has had progressive decline in terms of his Parkinson disease.  No fall.  No numbness weakness tingling.  No back pain.  No bowel or bladder incontinence or retention.  No fever cough URI symptoms.  No chest pain shortness of breath.  No abdominal pain nausea vomiting diarrhea.    In the ED, vitals were BP: 159/62 HR: 55 RR: 20 Spo2 98% on RA, afebrile. Labs significant for Na 122, K 3.2, mag 1.6. UA negative. Patient given NS 1L bolus, Mag 2 mg IV and K 20 mEq in ED. Patient currently admitted to medicine for electrolyte abn, progression of parkinson's disease.    Hospital Course:  Patient was given 1L of NS Bolus in ED and Na in AM labs 137 from 122 on admission.    PAST MEDICAL & SURGICAL HISTORY:  HTN (hypertension)    Depression    Hyperlipemia    Schizophrenia    No significant past surgical history      Allergies:  tetanus toxoid (Swelling)    Home Medications Reviewed  Hospital Medications:   MEDICATIONS  (STANDING):  amLODIPine   Tablet 10 milliGRAM(s) Oral at bedtime  aspirin enteric coated 81 milliGRAM(s) Oral daily  atorvastatin Oral Tab/Cap - Peds 40 milliGRAM(s) Oral daily  benztropine 1 milliGRAM(s) Oral two times a day  busPIRone 15 milliGRAM(s) Oral two times a day  diVALproex  milliGRAM(s) Oral two times a day  enoxaparin Injectable 40 milliGRAM(s) SubCutaneous every 24 hours  ergocalciferol 78020 Unit(s) Oral every week  haloperidol     Tablet 20 milliGRAM(s) Oral at bedtime  haloperidol     Tablet 5 milliGRAM(s) Oral daily  labetalol 100 milliGRAM(s) Oral two times a day  nicotine -  14 mG/24Hr(s) Patch 1 patch Transdermal daily      SOCIAL HISTORY:  Denies ETOH,Smoking,   FAMILY HISTORY:  FH: lung cancer (Father)          REVIEW OF SYSTEMS:  CONSTITUTIONAL: No weakness, fevers or chills  EYES/ENT: No visual changes;  No vertigo or throat pain   NECK: No pain or stiffness  RESPIRATORY: No cough, wheezing, hemoptysis; No shortness of breath  CARDIOVASCULAR: No chest pain or palpitations.  GASTROINTESTINAL: No abdominal or epigastric pain. No nausea, vomiting, or hematemesis; No diarrhea or constipation. No melena or hematochezia.  GENITOURINARY: No dysuria, frequency, foamy urine, urinary urgency, incontinence or hematuria  NEUROLOGICAL: No numbness or weakness  SKIN: No itching, burning, rashes, or lesions   VASCULAR: No bilateral lower extremity edema.   All other review of systems is negative unless indicated above.    VITALS:  T(F): 98.5 (22 @ 05:00), Max: 98.5 (22 @ 05:00)  HR: 77 (22 05:00)  BP: 175/92 (22 @ 05:00)  RR: 18 (22 @ 05:00)  SpO2: 94% (22 @ 23:35)     @ 07:01  -   @ 07:00  --------------------------------------------------------  IN: 160 mL / OUT: 200 mL / NET: -40 mL      Height (cm): 172.7 ( 15:12)  Weight (kg): 117.9 ( 15:12)  BMI (kg/m2): 39.5 ( 15:12)  BSA (m2): 2.28 ( 15:12)      I&O's Detail    2022 07:01  -  2022 07:00  --------------------------------------------------------  IN:    Oral Fluid: 160 mL  Total IN: 160 mL    OUT:    Voided (mL): 200 mL  Total OUT: 200 mL    Total NET: -40 mL        Creatine Kinase, Serum: 188 U/L (22 @ 16:15)      PHYSICAL EXAM:  Constitutional: NAD  HEENT: anicteric sclera, oropharynx clear, MMM  Neck: No JVD  Respiratory: CTAB, no wheezes, rales or rhonchi  Cardiovascular: S1, S2, RRR  Gastrointestinal: BS+, soft, NT/ND  Extremities: No cyanosis or clubbing. No peripheral edema  Neurological: A/O x 3, no focal deficits  Psychiatric: Normal mood, normal affect  : No CVA tenderness. No burroughs.   Skin: No rashes  Vascular Access:    LABS:      137  |  99  |  5<L>  ----------------------------<  87  3.2<L>   |  26  |  0.6<L>    Ca    9.5      2022 05:19  Phos  3.3       Mg     2.1         TPro  6.6  /  Alb  4.3  /  TBili  0.5  /  DBili      /  AST  9   /  ALT  <5  /  AlkPhos  79      Creatinine Trend: 0.6 <--, 0.6 <--, 0.7 <--                        13.0   6.90  )-----------( 199      ( 2022 05:19 )             36.9     Urine Studies:  Urinalysis Basic - ( 2022 17:09 )    Color: Colorless / Appearance: Clear / S.005 / pH:   Gluc:  / Ketone: Negative  / Bili: Negative / Urobili: <2 mg/dL   Blood:  / Protein: Negative / Nitrite: Negative   Leuk Esterase: Negative / RBC:  / WBC    Sq Epi:  / Non Sq Epi:  / Bacteria:       Osmolality, Random Urine: 55 mos/kg ( @ 20:29)  Sodium, Random Urine: 23.0 mmoL/L ( @ 20:29)  Osmolality, Random Urine: 55 mos/kg ( @ 19:30)  Sodium, Random Urine: 23.0 mmoL/L ( @ 19:30)            RADIOLOGY & ADDITIONAL STUDIES:                 HPI:  Mr. Simon  61-year-old male past medical history of, Parkinson bipolar hypertension hyperlipidemia,  chronic hyponatremia secondary to psychogenic polydipsia, long h/o mental illness, presents with 1 month of progressively worsening stiffness to lower back and lower legs.  Staff at [facility] states patient has had progressive decline in terms of his Parkinson disease.  No fall.  No numbness weakness tingling.  No back pain.  No bowel or bladder incontinence or retention.  No fever cough URI symptoms.  No chest pain shortness of breath.  No abdominal pain nausea vomiting diarrhea.    In the ED, vitals were BP: 159/62 HR: 55 RR: 20 Spo2 98% on RA, afebrile. Labs significant for Na 122, K 3.2, mag 1.6. UA negative. Patient given NS 1L bolus, Mag 2 mg IV and K 20 mEq in ED. Patient currently admitted to medicine for electrolyte abn, progression of parkinson's disease.    Hospital Course:  Patient was given 1L of NS Bolus in ED and Na in AM labs 137 from 122 on admission. Urine osmolarity as low as 55 with urine sodium 23.    PAST MEDICAL & SURGICAL HISTORY:  HTN (hypertension)    Depression    Hyperlipemia    Schizophrenia    No significant past surgical history      Allergies:  tetanus toxoid (Swelling)    Home Medications Reviewed  Hospital Medications:   MEDICATIONS  (STANDING):  amLODIPine   Tablet 10 milliGRAM(s) Oral at bedtime  aspirin enteric coated 81 milliGRAM(s) Oral daily  atorvastatin Oral Tab/Cap - Peds 40 milliGRAM(s) Oral daily  benztropine 1 milliGRAM(s) Oral two times a day  busPIRone 15 milliGRAM(s) Oral two times a day  diVALproex  milliGRAM(s) Oral two times a day  enoxaparin Injectable 40 milliGRAM(s) SubCutaneous every 24 hours  ergocalciferol 54993 Unit(s) Oral every week  haloperidol     Tablet 20 milliGRAM(s) Oral at bedtime  haloperidol     Tablet 5 milliGRAM(s) Oral daily  labetalol 100 milliGRAM(s) Oral two times a day  nicotine -  14 mG/24Hr(s) Patch 1 patch Transdermal daily      SOCIAL HISTORY:  Denies ETOH,Smoking,   FAMILY HISTORY:  FH: lung cancer (Father)          REVIEW OF SYSTEMS:  CONSTITUTIONAL: No weakness, fevers or chills  EYES/ENT: No visual changes;  No vertigo or throat pain   NECK: No pain or stiffness  RESPIRATORY: No cough, wheezing, hemoptysis; No shortness of breath  CARDIOVASCULAR: No chest pain or palpitations.  GASTROINTESTINAL: No abdominal or epigastric pain. No nausea, vomiting, or hematemesis; No diarrhea or constipation. No melena or hematochezia.  GENITOURINARY: No dysuria, frequency, foamy urine, urinary urgency, incontinence or hematuria  NEUROLOGICAL: No numbness or weakness  SKIN: No itching, burning, rashes, or lesions   VASCULAR: No bilateral lower extremity edema.   All other review of systems is negative unless indicated above.    VITALS:  T(F): 98.5 (22 @ 05:00), Max: 98.5 (22 @ 05:00)  HR: 77 (22 @ 05:00)  BP: 175/92 (22 @ 05:00)  RR: 18 (22 @ 05:00)  SpO2: 94% (22 @ 23:35)     @ 07:01  -   @ 07:00  --------------------------------------------------------  IN: 160 mL / OUT: 200 mL / NET: -40 mL      Height (cm): 172.7 ( 15:12)  Weight (kg): 117.9 ( 15:12)  BMI (kg/m2): 39.5 ( 15:12)  BSA (m2): 2.28 ( 15:12)      I&O's Detail    2022 07:01  -  2022 07:00  --------------------------------------------------------  IN:    Oral Fluid: 160 mL  Total IN: 160 mL    OUT:    Voided (mL): 200 mL  Total OUT: 200 mL    Total NET: -40 mL        Creatine Kinase, Serum: 188 U/L (04-12-22 @ 16:15)      PHYSICAL EXAM:  Constitutional: NAD  HEENT: anicteric sclera, oropharynx clear, MMM  Neck: No JVD  Respiratory: CTAB, no wheezes, rales or rhonchi  Cardiovascular: S1, S2, RRR  Gastrointestinal: BS+, soft, NT/ND  Extremities: No cyanosis or clubbing. No peripheral edema  Neurological: A/O x 3, no focal deficits  Psychiatric: Normal mood, normal affect  : No CVA tenderness. No burroughs.   Skin: No rashes  Vascular Access:    LABS:      137  |  99  |  5<L>  ----------------------------<  87  3.2<L>   |  26  |  0.6<L>    Ca    9.5      2022 05:19  Phos  3.3       Mg     2.1         TPro  6.6  /  Alb  4.3  /  TBili  0.5  /  DBili      /  AST  9   /  ALT  <5  /  AlkPhos  79      Creatinine Trend: 0.6 <--, 0.6 <--, 0.7 <--                        13.0   6.90  )-----------( 199      ( 2022 05:19 )             36.9     Urine Studies:  Urinalysis Basic - ( 2022 17:09 )    Color: Colorless / Appearance: Clear / S.005 / pH:   Gluc:  / Ketone: Negative  / Bili: Negative / Urobili: <2 mg/dL   Blood:  / Protein: Negative / Nitrite: Negative   Leuk Esterase: Negative / RBC:  / WBC    Sq Epi:  / Non Sq Epi:  / Bacteria:       Osmolality, Random Urine: 55 mos/kg ( @ 20:29)  Sodium, Random Urine: 23.0 mmoL/L ( @ 20:29)  Osmolality, Random Urine: 55 mos/kg ( @ 19:30)  Sodium, Random Urine: 23.0 mmoL/L ( @ 19:30)            RADIOLOGY & ADDITIONAL STUDIES:

## 2022-04-13 NOTE — BH CONSULTATION LIAISON ASSESSMENT NOTE - NSBHMSEMUSCLE_PSY_A_CORE
----- Message from Sherry Rincon sent at 5/6/2019  8:45 AM CDT -----  Regarding: RE: RESCHEDULE SURGERY May 9 w/ RDM  Kyle rollins,  ?Eulalia called him at 8am this morning.  He was not happy because he was still sleeping.  I see that his surgery has not been cancelled yet. Unless it was one of you calling him to reschedule???   Thanks.   Sherry         ----- Message -----  From: Sherry Rincon  Sent: 5/2/2019   1:51 PM  To: Sheryr Shafer, #  Subject: RESCHEDULE SURGERY May 9 w/ RDM                  Hi April and Raffi,  You will need to cancel and reschedule his surgery 5/9 w/ RDM,  Long had his preop today and his previous testicular surgery has not healed fully even though it's been 2 months.  His PCP thinks he needs another 4 weeks.   So our surgery should not be for another 6 weeks.  He will get rechecked and cleared a couple weeks  prior to our surgery date.  I'll make sure his [facility] authorization will still be good.  Sherry [phone]    
[facility] Call Center    Phone Message    May a detailed message be left on voicemail: yes    Reason for Call: Other: Margaret stated that  has to call the [facility] to request authorization for his surgery on 05/09/19 their phone number is [phone] [facility] office at the [facility]. Margaret also stated if you need to call her you can for clarification. Please follow up with the [facility]  asap surgery is 05/09 and they need it before the surgery. Thank you.     Action Taken: Message routed to:  [facility] ([facility]): colon and rectal     
[facility] Call Center    Phone Message    May a detailed message be left on voicemail: yes    Reason for Call: Other: See previous two encounters, Margaret from financial counseling would like this call to the [facility] made as soon as possible since they are running out of time before the procedure. Please calll her at [phone] to confirm when it has been taken care of.      Action Taken: Message routed to:  [facility] ([facility]): Correct no. to the VA is [phone]  
[facility] Call Center    Phone Message    May a detailed message be left on voicemail: yes    Reason for Call: Other: Margaret called back and said to call this number for the [phone]. Thank you.     Action Taken: Message routed to:  [facility] ([facility]): colon and recal    
Patient is scheduled for surgery with Dr. Mai      Spoke or left message with: Long    Date of Surgery: [id number]    Location: [facility]    H[facility]P: [facility]    Additional imaging/appointments: Post op with Beatrice Calixto    Surgery packet: Will mail       
Normal muscle tone/strength

## 2022-04-13 NOTE — BH CONSULTATION LIAISON ASSESSMENT NOTE - ADDITIONAL DETAILS ALL
On 08/08/19 he presented to the [facility] ED  with complaints of wanting to cut his wrist or stab himself. He was admitted to [facility]. On 09/06/19, he was discharged with the Principal Discharge Diagnosis of Chronic schizoaffective disorder with acute exacerbation. On 11/31/21 he presented to the ED with thoughts of cutting self with scissors

## 2022-04-14 LAB
ANION GAP SERPL CALC-SCNC: 10 MMOL/L — SIGNIFICANT CHANGE UP (ref 7–14)
BASOPHILS # BLD AUTO: 0.08 K/UL — SIGNIFICANT CHANGE UP (ref 0–0.2)
BASOPHILS NFR BLD AUTO: 1 % — SIGNIFICANT CHANGE UP (ref 0–1)
BUN SERPL-MCNC: 10 MG/DL — SIGNIFICANT CHANGE UP (ref 10–20)
CALCIUM SERPL-MCNC: 9.3 MG/DL — SIGNIFICANT CHANGE UP (ref 8.5–10.1)
CHLORIDE SERPL-SCNC: 100 MMOL/L — SIGNIFICANT CHANGE UP (ref 98–110)
CO2 SERPL-SCNC: 25 MMOL/L — SIGNIFICANT CHANGE UP (ref 17–32)
CREAT SERPL-MCNC: 0.8 MG/DL — SIGNIFICANT CHANGE UP (ref 0.7–1.5)
EGFR: 101 ML/MIN/1.73M2 — SIGNIFICANT CHANGE UP
EOSINOPHIL # BLD AUTO: 0.17 K/UL — SIGNIFICANT CHANGE UP (ref 0–0.7)
EOSINOPHIL NFR BLD AUTO: 2.1 % — SIGNIFICANT CHANGE UP (ref 0–8)
GLUCOSE SERPL-MCNC: 81 MG/DL — SIGNIFICANT CHANGE UP (ref 70–99)
HCT VFR BLD CALC: 36.2 % — LOW (ref 42–52)
HGB BLD-MCNC: 12.6 G/DL — LOW (ref 14–18)
IMM GRANULOCYTES NFR BLD AUTO: 0.6 % — HIGH (ref 0.1–0.3)
LYMPHOCYTES # BLD AUTO: 3 K/UL — SIGNIFICANT CHANGE UP (ref 1.2–3.4)
LYMPHOCYTES # BLD AUTO: 36.8 % — SIGNIFICANT CHANGE UP (ref 20.5–51.1)
MAGNESIUM SERPL-MCNC: 2.1 MG/DL — SIGNIFICANT CHANGE UP (ref 1.8–2.4)
MCHC RBC-ENTMCNC: 27.3 PG — SIGNIFICANT CHANGE UP (ref 27–31)
MCHC RBC-ENTMCNC: 34.8 G/DL — SIGNIFICANT CHANGE UP (ref 32–37)
MCV RBC AUTO: 78.4 FL — LOW (ref 80–94)
MONOCYTES # BLD AUTO: 0.86 K/UL — HIGH (ref 0.1–0.6)
MONOCYTES NFR BLD AUTO: 10.6 % — HIGH (ref 1.7–9.3)
NEUTROPHILS # BLD AUTO: 3.99 K/UL — SIGNIFICANT CHANGE UP (ref 1.4–6.5)
NEUTROPHILS NFR BLD AUTO: 48.9 % — SIGNIFICANT CHANGE UP (ref 42.2–75.2)
NRBC # BLD: 0 /100 WBCS — SIGNIFICANT CHANGE UP (ref 0–0)
PLATELET # BLD AUTO: 187 K/UL — SIGNIFICANT CHANGE UP (ref 130–400)
POTASSIUM SERPL-MCNC: 3.7 MMOL/L — SIGNIFICANT CHANGE UP (ref 3.5–5)
POTASSIUM SERPL-SCNC: 3.7 MMOL/L — SIGNIFICANT CHANGE UP (ref 3.5–5)
RBC # BLD: 4.62 M/UL — LOW (ref 4.7–6.1)
RBC # FLD: 13.9 % — SIGNIFICANT CHANGE UP (ref 11.5–14.5)
SODIUM SERPL-SCNC: 135 MMOL/L — SIGNIFICANT CHANGE UP (ref 135–146)
WBC # BLD: 8.15 K/UL — SIGNIFICANT CHANGE UP (ref 4.8–10.8)
WBC # FLD AUTO: 8.15 K/UL — SIGNIFICANT CHANGE UP (ref 4.8–10.8)

## 2022-04-14 PROCEDURE — 99232 SBSQ HOSP IP/OBS MODERATE 35: CPT

## 2022-04-14 PROCEDURE — 99233 SBSQ HOSP IP/OBS HIGH 50: CPT | Mod: GC

## 2022-04-14 PROCEDURE — 72131 CT LUMBAR SPINE W/O DYE: CPT | Mod: 26

## 2022-04-14 RX ORDER — LIDOCAINE 4 G/100G
1 CREAM TOPICAL EVERY 24 HOURS
Refills: 0 | Status: DISCONTINUED | OUTPATIENT
Start: 2022-04-14 | End: 2022-04-15

## 2022-04-14 RX ORDER — BENZTROPINE MESYLATE 1 MG
2 TABLET ORAL
Refills: 0 | Status: DISCONTINUED | OUTPATIENT
Start: 2022-04-14 | End: 2022-04-15

## 2022-04-14 RX ORDER — GABAPENTIN 400 MG/1
300 CAPSULE ORAL THREE TIMES A DAY
Refills: 0 | Status: DISCONTINUED | OUTPATIENT
Start: 2022-04-14 | End: 2022-04-15

## 2022-04-14 RX ADMIN — Medication 1 MILLIGRAM(S): at 05:41

## 2022-04-14 RX ADMIN — AMLODIPINE BESYLATE 10 MILLIGRAM(S): 2.5 TABLET ORAL at 22:01

## 2022-04-14 RX ADMIN — HALOPERIDOL DECANOATE 20 MILLIGRAM(S): 100 INJECTION INTRAMUSCULAR at 22:01

## 2022-04-14 RX ADMIN — HALOPERIDOL DECANOATE 5 MILLIGRAM(S): 100 INJECTION INTRAMUSCULAR at 13:03

## 2022-04-14 RX ADMIN — DIVALPROEX SODIUM 500 MILLIGRAM(S): 500 TABLET, DELAYED RELEASE ORAL at 05:41

## 2022-04-14 RX ADMIN — DIVALPROEX SODIUM 500 MILLIGRAM(S): 500 TABLET, DELAYED RELEASE ORAL at 17:59

## 2022-04-14 RX ADMIN — Medication 15 MILLIGRAM(S): at 05:41

## 2022-04-14 RX ADMIN — Medication 2 MILLIGRAM(S): at 17:59

## 2022-04-14 RX ADMIN — Medication 15 MILLIGRAM(S): at 17:59

## 2022-04-14 RX ADMIN — ATORVASTATIN CALCIUM 40 MILLIGRAM(S): 80 TABLET, FILM COATED ORAL at 13:02

## 2022-04-14 RX ADMIN — Medication 81 MILLIGRAM(S): at 12:54

## 2022-04-14 RX ADMIN — Medication 100 MILLIGRAM(S): at 05:41

## 2022-04-14 RX ADMIN — GABAPENTIN 300 MILLIGRAM(S): 400 CAPSULE ORAL at 22:01

## 2022-04-14 RX ADMIN — Medication 1 PATCH: at 13:03

## 2022-04-14 RX ADMIN — GABAPENTIN 300 MILLIGRAM(S): 400 CAPSULE ORAL at 13:04

## 2022-04-14 RX ADMIN — Medication 100 MILLIGRAM(S): at 17:59

## 2022-04-14 RX ADMIN — ENOXAPARIN SODIUM 40 MILLIGRAM(S): 100 INJECTION SUBCUTANEOUS at 12:54

## 2022-04-14 NOTE — BH CONSULTATION LIAISON PROGRESS NOTE - CURRENT MEDICATION
MEDICATIONS  (STANDING):  amLODIPine   Tablet 10 milliGRAM(s) Oral at bedtime  aspirin enteric coated 81 milliGRAM(s) Oral daily  atorvastatin Oral Tab/Cap - Peds 40 milliGRAM(s) Oral daily  benztropine 1 milliGRAM(s) Oral two times a day  busPIRone 15 milliGRAM(s) Oral two times a day  diVALproex  milliGRAM(s) Oral two times a day  enoxaparin Injectable 40 milliGRAM(s) SubCutaneous every 24 hours  ergocalciferol 74562 Unit(s) Oral every week  gabapentin 300 milliGRAM(s) Oral three times a day  haloperidol     Tablet 20 milliGRAM(s) Oral at bedtime  haloperidol     Tablet 5 milliGRAM(s) Oral daily  labetalol 100 milliGRAM(s) Oral two times a day  lidocaine   4% Patch 1 Patch Transdermal every 24 hours  nicotine -  14 mG/24Hr(s) Patch 1 patch Transdermal daily    MEDICATIONS  (PRN):

## 2022-04-14 NOTE — PROGRESS NOTE ADULT - SUBJECTIVE AND OBJECTIVE BOX
SUBJECTIVE:    Patient is a 61y old Male who presents with a chief complaint of back pain (2022 10:38)    Currently admitted to medicine with the primary diagnosis of Hyponatremia       Today is hospital day 2d. This morning he is resting comfortably in bed and reports no new issues or overnight events.     PAST MEDICAL & SURGICAL HISTORY  HTN (hypertension)    Depression    Hyperlipemia    Schizophrenia    No significant past surgical history      SOCIAL HISTORY:  Negative for smoking/alcohol/drug use.     ALLERGIES:  tetanus toxoid (Swelling)    MEDICATIONS:  STANDING MEDICATIONS  amLODIPine   Tablet 10 milliGRAM(s) Oral at bedtime  aspirin enteric coated 81 milliGRAM(s) Oral daily  atorvastatin Oral Tab/Cap - Peds 40 milliGRAM(s) Oral daily  benztropine 1 milliGRAM(s) Oral two times a day  busPIRone 15 milliGRAM(s) Oral two times a day  diVALproex  milliGRAM(s) Oral two times a day  enoxaparin Injectable 40 milliGRAM(s) SubCutaneous every 24 hours  ergocalciferol 15034 Unit(s) Oral every week  gabapentin 300 milliGRAM(s) Oral three times a day  haloperidol     Tablet 20 milliGRAM(s) Oral at bedtime  haloperidol     Tablet 5 milliGRAM(s) Oral daily  labetalol 100 milliGRAM(s) Oral two times a day  lidocaine   4% Patch 1 Patch Transdermal every 24 hours  nicotine -  14 mG/24Hr(s) Patch 1 patch Transdermal daily    PRN MEDICATIONS    VITALS:   T(F): 97.8  HR: 71  BP: 168/92  RR: 20  SpO2: 95%    LABS:                        12.6   8.15  )-----------( 187      ( 2022 07:40 )             36.2     04-14    135  |  100  |  10  ----------------------------<  81  3.7   |  25  |  0.8    Ca    9.3      2022 07:40  Phos  3.3     04-12  Mg     2.1     04-14    TPro  6.6  /  Alb  4.3  /  TBili  0.5  /  DBili  x   /  AST  9   /  ALT  <5  /  AlkPhos  79  04-13      Urinalysis Basic - ( 2022 17:09 )    Color: Colorless / Appearance: Clear / S.005 / pH: x  Gluc: x / Ketone: Negative  / Bili: Negative / Urobili: <2 mg/dL   Blood: x / Protein: Negative / Nitrite: Negative   Leuk Esterase: Negative / RBC: x / WBC x   Sq Epi: x / Non Sq Epi: x / Bacteria: x            Culture - Urine (collected 2022 17:09)  Source: Clean Catch Clean Catch (Midstream)  Final Report (2022 19:50):    >=3 organisms. Probable collection contamination.      CARDIAC MARKERS ( 2022 16:15 )  x     / x     / 188 U/L / x     / x          RADIOLOGY:    PHYSICAL EXAM:  GEN: No acute distress  LUNGS: Clear to auscultation bilaterally   HEART: Regular  ABD: Soft, non-tender, non-distended.  EXT: NC/NC/NE/2+PP/BIRD/Skin Intact.   NEURO: AAOX3    Intravenous access:   NG tube:   Oliver Catheter:

## 2022-04-14 NOTE — BH CONSULTATION LIAISON PROGRESS NOTE - NSBHMSETHTCONTENT_PSY_A_CORE
Please call Dr. Herbert's office at  to been seen in a follow up office visit two weeks after the completion of your procedure.
Unremarkable

## 2022-04-14 NOTE — PROGRESS NOTE ADULT - ASSESSMENT
61-year-old male past medical history of, Schizoaffective disorder with previous IPP admission, hypertension hyperlipidemia, 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     #Progressive stiffness/tremors concerning for worsening parkinsonism due to psych meds  #Hx schizoaffective disorder  #Lower back pain  - home meds: Buspirone 15mg, Haldol 50 mg IM q 4 weeks and 20mg and 5 mg PO, Benztropine 1 mg BID, and divalproex 1000 mg at bedtime   - Per psych, recd to continue haldol for now  - PT consult  - CT Lumbar spine NC ordered    # Euvolemic Hyponatremia  # Hypomagnesemia, resolved  # Hypokalemia   - c/w benztropine  - monitor bmp , replete PRN    # HTN  - c/w amlodipine, losartan and labetalol     # Ambulate as tolerated  - fall precaution     # DASH diet    DVT PPX, Lovenox    #Progress Note Handoff  Pending (specify): CT Lumbar spine, PT, neuro f/u  Family discussion: d/w pt regarding evaluating for lower back pain  Disposition: Home 61-year-old male past medical history of, Schizoaffective disorder with previous IPP admission, hypertension hyperlipidemia, 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     #Progressive stiffness/tremors concerning for worsening parkinsonism due to psych meds  #Hx schizoaffective disorder  #Lower back pain  - home meds: Buspirone 15mg, Haldol 50 mg IM q 4 weeks and 20mg and 5 mg PO, Benztropine 1 mg BID, and divalproex 1000 mg at bedtime   - Per psych, recd to continue haldol for now  - PT consult  - CT Lumbar spine NC ordered  - Lidocaine patch  - Gabapentin 300 TID    # Euvolemic Hyponatremia  # Hypomagnesemia, resolved  # Hypokalemia   - c/w benztropine  - monitor bmp , replete PRN    # HTN  - c/w amlodipine, losartan and labetalol     # Ambulate as tolerated  - fall precaution     # DASH diet    DVT PPX, Lovenox    #Progress Note Handoff  Pending (specify): CT Lumbar spine, PT, neuro f/u  Family discussion: d/w pt regarding evaluating for lower back pain  Disposition: Home 61-year-old male past medical history of, Schizoaffective disorder with previous IPP admission, hypertension hyperlipidemia, 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     #Progressive stiffness/tremors concerning for worsening parkinsonism due to psych meds  #Hx schizoaffective disorder  #Lower back pain  - home meds: Buspirone 15mg, Haldol 50 mg IM q 4 weeks and 20mg and 5 mg PO, Benztropine 1 mg BID, and divalproex 1000 mg at bedtime   - Per psych, recd to continue haldol for now  - PT consult  - CT Lumbar spine NC ordered  - Lidocaine patch  - Gabapentin 300 TID  - Left message with outpatient psychiatrist, but unable to be reached    # Euvolemic Hyponatremia  # Hypomagnesemia, resolved  # Hypokalemia   - c/w benztropine  - monitor bmp , replete PRN    # HTN  - c/w amlodipine, losartan and labetalol     # Ambulate as tolerated  - fall precaution     # DASH diet    DVT PPX, Lovenox    #Progress Note Handoff  Pending (specify): CT Lumbar spine, PT, neuro f/u  Family discussion: d/w pt regarding evaluating for lower back pain.  Disposition: Home

## 2022-04-14 NOTE — PROGRESS NOTE ADULT - SUBJECTIVE AND OBJECTIVE BOX
SAM REA  61y, Male  Allergy: tetanus toxoid (Swelling)    Hospital Day: 2d    Patient seen and examined. No acute events overnight    PMH/PSH:  PAST MEDICAL & SURGICAL HISTORY:  HTN (hypertension)    Depression    Hyperlipemia    Schizophrenia    No significant past surgical history        VITALS:  T(F): 97 (22 @ 05:29), Max: 97.4 (22 @ 11:36)  HR: 61 (22 @ 05:29)  BP: 178/83 (22 @ 05:29) (131/75 - 178/83)  RR: 18 (22 @ 05:29)  SpO2: 95% (22 @ 21:24)    TESTS & MEASUREMENTS:  Weight (Kg): 117.9 (22 @ 15:12)  BMI (kg/m2): 39.5 ()    22 @ 07:01  -  22 @ 07:00  --------------------------------------------------------  IN: 160 mL / OUT: 200 mL / NET: -40 mL    22 @ 07:01  -  22 @ 07:00  --------------------------------------------------------  IN: 584 mL / OUT: 1150 mL / NET: -566 mL                        12.6   8.15  )-----------( 187      ( 2022 07:40 )             36.2         135  |  100  |  10  ----------------------------<  81  3.7   |  25  |  0.8    Ca    9.3      2022 07:40  Phos  3.3       Mg     2.1         TPro  6.6  /  Alb  4.3  /  TBili  0.5  /  DBili  x   /  AST  9   /  ALT  <5  /  AlkPhos  79  13    LIVER FUNCTIONS - ( 2022 05:19 )  Alb: 4.3 g/dL / Pro: 6.6 g/dL / ALK PHOS: 79 U/L / ALT: <5 U/L / AST: 9 U/L / GGT: x           CARDIAC MARKERS ( 2022 16:15 )  x     / x     / 188 U/L / x     / x        Culture - Urine (collected 22 @ 17:09)  Source: Clean Catch Clean Catch (Midstream)  Final Report (22 @ 19:50):    >=3 organisms. Probable collection contamination.    Urinalysis Basic - ( 2022 17:09 )    Color: Colorless / Appearance: Clear / S.005 / pH: x  Gluc: x / Ketone: Negative  / Bili: Negative / Urobili: <2 mg/dL   Blood: x / Protein: Negative / Nitrite: Negative   Leuk Esterase: Negative / RBC: x / WBC x   Sq Epi: x / Non Sq Epi: x / Bacteria: x    RECENT DIAGNOSTIC ORDERS:  CT Lumbar Spine No Cont: Routine   Indication: Lower back pain  Transport: Wheelchair (22 @ 10:38)  Diet, DASH/TLC:   Sodium & Cholesterol Restricted  1500mL Fluid Restriction ([id number]) (22 @ 07:50)    MEDICATIONS:  MEDICATIONS  (STANDING):  amLODIPine   Tablet 10 milliGRAM(s) Oral at bedtime  aspirin enteric coated 81 milliGRAM(s) Oral daily  atorvastatin Oral Tab/Cap - Peds 40 milliGRAM(s) Oral daily  benztropine 1 milliGRAM(s) Oral two times a day  busPIRone 15 milliGRAM(s) Oral two times a day  diVALproex  milliGRAM(s) Oral two times a day  enoxaparin Injectable 40 milliGRAM(s) SubCutaneous every 24 hours  ergocalciferol 98546 Unit(s) Oral every week  haloperidol     Tablet 20 milliGRAM(s) Oral at bedtime  haloperidol     Tablet 5 milliGRAM(s) Oral daily  labetalol 100 milliGRAM(s) Oral two times a day  nicotine -  14 mG/24Hr(s) Patch 1 patch Transdermal daily    MEDICATIONS  (PRN):    HOME MEDICATIONS:  amLODIPine 10 mg oral tablet (07-15)  aspirin 81 mg oral delayed release tablet (07-15)  benztropine 1 mg oral tablet ()  busPIRone 15 mg oral tablet (07-15)  divalproex sodium 500 mg oral delayed release tablet (07-15)  haloperidol 20 mg oral tablet ()  haloperidol 5 mg oral tablet ()  haloperidol 5 mg/mL injectable solution ()  labetalol 100 mg oral tablet ()  Vitamin D2 1.25 mg (50,000 intl units) oral capsule ()    REVIEW OF SYSTEMS:  All other review of systems is negative unless indicated above.     PHYSICAL EXAM:  PHYSICAL EXAM:  GENERAL: NAD, well-developed  HEAD:  Atraumatic, Normocephalic  NECK: Supple, No JVD  CHEST/LUNG: Clear to auscultation bilaterally; No wheeze  HEART: Regular rate and rhythm; No murmurs, rubs, or gallops  ABDOMEN: Soft, Nontender, Nondistended; Bowel sounds present  EXTREMITIES:  2+ Peripheral Pulses, No clubbing, cyanosis, or edema  SKIN: No rashes or lesions

## 2022-04-14 NOTE — PHYSICAL THERAPY INITIAL EVALUATION ADULT - PERTINENT HX OF CURRENT PROBLEM, REHAB EVAL
61-year-old male past medical history of, Parkinson bipolar hypertension hyperlipidemia,  chronic hyponatremia secondary to psychogenic polydipsia, long h/o mental illness, presents with 1 month of progressively worsening stiffness to lower back and lower legs and tremors

## 2022-04-14 NOTE — PROGRESS NOTE ADULT - SUBJECTIVE AND OBJECTIVE BOX
Nephrology progress note    Patient is seen and examined, events over the last 24 h noted .  Resting in bed  Allergies:  tetanus toxoid (Swelling)    Hospital Medications:   MEDICATIONS  (STANDING):  amLODIPine   Tablet 10 milliGRAM(s) Oral at bedtime  aspirin enteric coated 81 milliGRAM(s) Oral daily  atorvastatin Oral Tab/Cap - Peds 40 milliGRAM(s) Oral daily  benztropine 1 milliGRAM(s) Oral two times a day  busPIRone 15 milliGRAM(s) Oral two times a day  diVALproex  milliGRAM(s) Oral two times a day  enoxaparin Injectable 40 milliGRAM(s) SubCutaneous every 24 hours  ergocalciferol 45307 Unit(s) Oral every week  gabapentin 300 milliGRAM(s) Oral three times a day  haloperidol     Tablet 20 milliGRAM(s) Oral at bedtime  haloperidol     Tablet 5 milliGRAM(s) Oral daily  labetalol 100 milliGRAM(s) Oral two times a day  lidocaine   4% Patch 1 Patch Transdermal every 24 hours  nicotine -  14 mG/24Hr(s) Patch 1 patch Transdermal daily        VITALS:  T(F): 97.8 (22 @ 12:14), Max: 97.8 (22 @ 12:14)  HR: 71 (22 @ 12:14)  BP: 168/92 (22 @ 12:14)  RR: 20 (22 @ 12:14)  SpO2: 95% (22 @ 21:24)  Wt(kg): --     @ :  -   @ 07:00  --------------------------------------------------------  IN: 160 mL / OUT: 200 mL / NET: -40 mL     @ 07:01  -   @ 07:00  --------------------------------------------------------  IN: 584 mL / OUT: 1150 mL / NET: -566 mL     @ 07:01  -   @ 14:29  --------------------------------------------------------  IN: 120 mL / OUT: 0 mL / NET: 120 mL          PHYSICAL EXAM:  Constitutional: NAD  HEENT: anicteric sclera,  Neck: No JVD  Respiratory: CTA  Cardiovascular: S1, S2, RRR  Gastrointestinal: BS+, soft, NT/ND  Extremities:  No peripheral edema  Neurological: Awake   : No CVA tenderness. No burroughs.   Skin: No rashes  Vascular Access:    LABS:      135  |  100  |  10  ----------------------------<  81  3.7   |  25  |  0.8  SODIUM TREND:  Sodium 135 [ @ 07:40]  Sodium 137 [ @ 05:19]  Sodium 135 [ @ 23:30]  Sodium 122 [ @ 16:15]    Ca    9.3      2022 07:40  Phos  3.3       Mg     2.1         TPro  6.6  /  Alb  4.3  /  TBili  0.5  /  DBili      /  AST  9   /  ALT  <5  /  AlkPhos  79                            12.6   8.15  )-----------( 187      ( 2022 07:40 )             36.2       Urine Studies:  Urinalysis Basic - ( 2022 17:09 )    Color: Colorless / Appearance: Clear / S.005 / pH:   Gluc:  / Ketone: Negative  / Bili: Negative / Urobili: <2 mg/dL   Blood:  / Protein: Negative / Nitrite: Negative   Leuk Esterase: Negative / RBC:  / WBC    Sq Epi:  / Non Sq Epi:  / Bacteria:       Osmolality, Random Urine: 55 mos/kg ( @ 20:29)  Sodium, Random Urine: 23.0 mmoL/L ( @ 20:29)  Osmolality, Random Urine: 55 mos/kg ( @ 19:30)  Sodium, Random Urine: 23.0 mmoL/L ( @ 19:30)    RADIOLOGY & ADDITIONAL STUDIES:  < from: Xray Chest 1 View- PORTABLE-Urgent (Xray Chest 1 View- PORTABLE-Urgent .) (22 @ 19:00) >  No acute abnormality on frontal chest radiograph.    < end of copied text >

## 2022-04-14 NOTE — PROGRESS NOTE ADULT - ASSESSMENT
61-year-old male past medical history of, Parkinson bipolar hypertension hyperlipidemia, chronic hyponatremia secondary to psychogenic polydipsia, long h/o mental illness, presents with 1 month of progressively worsening stiffness to lower back and lower legs.    Patients labs consistent with excessive free water intake. Patient admits to drinking "a lot of sodas and water" and says he feels thirsty all the time. Currently Sodium has been corrected but would avoid further aggressive correction. Patient drinking and eating now, will recommend liberal fluid intake for today and recommend fluid restriction to 1.5L or less starting tomorrow.    # Euvolemic hypo-osmolar hyponatremia  # H/O Chronic Hyponatremia 2/2 Psychogenic Polydipsia  - s/p 1L NS in ED  - Na on admission 122 and now 137  - TSH 2, Urine Osmolarity 55, Urine Na 23  - very low urine Osm, no polyuria  moderate fluid restriction 1.5 L daily  # HTN  - Can resume Labetalol and ACE/ARB  - Would avoid thiazides (used to be on HCTZ)  - may replace Amlodipine with Nifedipine 30 qd if BP remains high    Of note, Lt parotid lesion noted on MRI in 7/2021  - may need further investigation as per primary team    will sign off  recall as needed

## 2022-04-14 NOTE — BH CONSULTATION LIAISON PROGRESS NOTE - NSBHFUPINTERVALHXFT_PSY_A_CORE
No acute events overnight. Patient continues to complain about tremors, although he feels that they are improved since yesterday.

## 2022-04-14 NOTE — PROGRESS NOTE ADULT - ASSESSMENT
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     # Progressive stiffness/tremors concerning for progression of parkinsonism   - c/w home meds: Buspirone 15mg, Haldol 50 mg IM q 4 weeks and 20mg and 5 mg PO, Benztropine 1 mg BID, and divalproex 1000 mg at bedtime   - As per neuro, this is likely medication induced parkinson's and recommended psych consult to change haldol, psych consulted, will c/w haldol for now  - PT follow up    # Hyponatremia acute on chronic most likely 2/2 psychogenic polydipsia vs medication use  - c/w with fluid restriction 1500 ml  - trend bmp  - urine Na 23, osm 55, serum osm 283  - tsh 2.04  - appreciate nephro eval    # Schizoaffective disorder  - home meds: Buspirone 15mg, Haldol 50 mg IM q 4 weeks and 20mg and 5 mg PO, Benztropine 1 mg BID, and divalproex 1000 mg at bedtime    (currently normal affect)  - will c/w haldol for now as per psych  - monitor QT prolongation  - Monitor EKG     #Misc  - DVT Prophylaxis: Lovenox subQ  - GI Prophylaxis: not indicated  - Diet: DASH/TLC  - Activity: PT/OT eval  - Code Status: full code             61-year-old male past medical history of, Parkinson bipolar hypertension hyperlipidemia,  chronic hyponatremia secondary to psychogenic polydipsia, long h/o mental illness, presents with 1 month of progressively worsening stiffness to lower back and lower legs and tremors     # Progressive stiffness/tremors concerning for progression of parkinsonism   - c/w home meds: Buspirone 15mg, Haldol 50 mg IM q 4 weeks and 20mg and 5 mg PO, Benztropine 1 mg BID, and divalproex 1000 mg at bedtime   - As per neuro, this is likely medication induced parkinson's and recommended psych consult to change haldol, psych consulted, will c/w haldol for now  - PT follow up  - f/u CT lumbar spine, complaining of low back pain today    # Hyponatremia acute on chronic most likely 2/2 psychogenic polydipsia vs medication use  - c/w with fluid restriction 1500 ml  - trend bmp  - urine Na 23, osm 55, serum osm 283  - tsh 2.04  - appreciate nephro eval    # Schizoaffective disorder  - home meds: Buspirone 15mg, Haldol 50 mg IM q 4 weeks and 20mg and 5 mg PO, Benztropine 1 mg BID, and divalproex 1000 mg at bedtime    (currently normal affect)  - will c/w haldol for now as per psych  - monitor QT prolongation  - Dr. Garcia left msg for outpatient psych, still have not heard back  - Monitor EKG     #Misc  - DVT Prophylaxis: Lovenox subQ  - GI Prophylaxis: not indicated  - Diet: DASH/TLC  - Activity: PT/OT eval  - Code Status: full code

## 2022-04-14 NOTE — PHYSICAL THERAPY INITIAL EVALUATION ADULT - GAIT DEVIATIONS NOTED, PT EVAL
Decreased speed, forward flexed posture, pt stated that he felt like his legs were "giving out" due to fatigue and weakness. No knee buckling observed this amb trial however limited amb distance for safety due to pt concerns./decreased ina/decreased step length/decreased stride length/decreased weight-shifting ability

## 2022-04-14 NOTE — BH CONSULTATION LIAISON PROGRESS NOTE - NSBHASSESSMENTFT_PSY_ALL_CORE
Patient is a 60yo  male, single, no children, domiciled in [facility], unemployed, with past medical history of hypertension and chronic hyponatremia, past psychiatric history of schizoaffective disorder and two inpatient psychiatric admissions to [facility] (07/17/19 - 08/15/19 and 08/08/19 - 09/06/19), presenting to the ED for lower back pain. Psychiatry consulted for management of EPS symptoms.  Patient was observed to have symptoms suggestive of parkinsonism. it is however unclear if patient has a primary parkinson's disease versus drug indice parkinsonism. However , given the chronicity of his illness it is most likely that this a medication induced.   We however do not recommend discontinuing the haldol without contacting patient's outpatient psychiatrist because of the risk of the an acute decompensation of his schizoaffective disorder .Patient may benefit from increasing the cogentin or adding other medications that will target parkinsonism like Amantadine. This descion should be made in concert with the patient and family.     Impression  - Patient presents with b/l upper extremity resting tremors, rigidity, and bradykinesia in the setting of chronic Haldol use for known Schizoaffective Disorder. He has no pre-existing diagnosis of Parkinson's Disease and has never been prescribed any dopaminergic agonists.    Plan  - Attempt was made to obtain collateral from outpatient Psychiatrist. Spoke with staff at nursing home who were reluctant to provide staff Psychiatrist's cell phone number, but took my number and agreed to have staff Psychiatrist return my call.   - Continue with Haldol 20mg PO qhs. Discontinue Haldol 5mg PO dose.  - Increased Cogentin to 2mg BID.  - Monitor for improvement in parkinsonism  - Obtain CT of L-spine as patient complains of new lower back pain Patient is a 62yo  male, single, no children, domiciled in [facility], unemployed, with past medical history of hypertension and chronic hyponatremia, past psychiatric history of schizoaffective disorder and two inpatient psychiatric admissions to [facility] (07/17/19 - 08/15/19 and 08/08/19 - 09/06/19), presenting to the ED for lower back pain. Psychiatry consulted for management of EPS symptoms.  Patient was observed to have symptoms suggestive of parkinsonism. it is however unclear if patient has a primary parkinson's disease versus drug indice parkinsonism. However , given the chronicity of his illness it is most likely that this a medication induced.   We however do not recommend discontinuing the haldol without contacting patient's outpatient psychiatrist because of the risk of the an acute decompensation of his schizoaffective disorder .Patient may benefit from increasing the cogentin or adding other medications that will target parkinsonism like Amantadine. This descion should be made in concert with the patient and family.     Impression  - Patient presents with bilatral upper extremity resting tremors, rigidity, and bradykinesia in the setting of chronic Haldol use for known Schizoaffective Disorder. He has no pre-existing diagnosis of Parkinson's Disease and has never been prescribed any dopaminergic agonists.    Plan  - Attempt was made to obtain collateral from outpatient Psychiatrist. Spoke with staff at nursing home who were reluctant to provide staff Psychiatrist's cell phone number, but took my number and agreed to have staff Psychiatrist return my call.   - Continue with Haldol 20mg PO qhs. Discontinue Haldol 5mg PO dose.  - Increased Cogentin to 2mg BID.  - Monitor for improvement in parkinsonism  - Obtain CT of L-spine as patient complains of new lower back pain

## 2022-04-14 NOTE — BH CONSULTATION LIAISON PROGRESS NOTE - NSBHCHARTREVIEWVS_PSY_A_CORE FT
Vital Signs Last 24 Hrs  T(C): 36.6 (14 Apr 2022 12:14), Max: 36.6 (14 Apr 2022 12:14)  T(F): 97.8 (14 Apr 2022 12:14), Max: 97.8 (14 Apr 2022 12:14)  HR: 71 (14 Apr 2022 12:14) (61 - 71)  BP: 168/92 (14 Apr 2022 12:14) (168/92 - 178/83)  BP(mean): --  RR: 20 (14 Apr 2022 12:14) (18 - 20)  SpO2: 95% (13 Apr 2022 21:24) (95% - 95%)

## 2022-04-14 NOTE — PHYSICAL THERAPY INITIAL EVALUATION ADULT - LEVEL OF INDEPENDENCE: SIT/STAND, REHAB EVAL
With extra time to initiate transfer from elevated EOB. As per pt's brother, this has been a recent ongoing concern as pt has had increasing difficulty with standing up./minimum assist (75% patients effort)

## 2022-04-15 ENCOUNTER — TRANSCRIPTION ENCOUNTER (OUTPATIENT)
Age: 62
End: 2022-04-15

## 2022-04-15 VITALS
RESPIRATION RATE: 18 BRPM | SYSTOLIC BLOOD PRESSURE: 118 MMHG | TEMPERATURE: 97 F | DIASTOLIC BLOOD PRESSURE: 56 MMHG | HEART RATE: 77 BPM

## 2022-04-15 LAB
ANION GAP SERPL CALC-SCNC: 10 MMOL/L — SIGNIFICANT CHANGE UP (ref 7–14)
BASOPHILS # BLD AUTO: 0.06 K/UL — SIGNIFICANT CHANGE UP (ref 0–0.2)
BASOPHILS NFR BLD AUTO: 0.8 % — SIGNIFICANT CHANGE UP (ref 0–1)
BUN SERPL-MCNC: 11 MG/DL — SIGNIFICANT CHANGE UP (ref 10–20)
CALCIUM SERPL-MCNC: 9.3 MG/DL — SIGNIFICANT CHANGE UP (ref 8.5–10.1)
CHLORIDE SERPL-SCNC: 96 MMOL/L — LOW (ref 98–110)
CO2 SERPL-SCNC: 25 MMOL/L — SIGNIFICANT CHANGE UP (ref 17–32)
CREAT SERPL-MCNC: 0.9 MG/DL — SIGNIFICANT CHANGE UP (ref 0.7–1.5)
EGFR: 98 ML/MIN/1.73M2 — SIGNIFICANT CHANGE UP
EOSINOPHIL # BLD AUTO: 0.26 K/UL — SIGNIFICANT CHANGE UP (ref 0–0.7)
EOSINOPHIL NFR BLD AUTO: 3.4 % — SIGNIFICANT CHANGE UP (ref 0–8)
GLUCOSE BLDC GLUCOMTR-MCNC: 122 MG/DL — HIGH (ref 70–99)
GLUCOSE SERPL-MCNC: 78 MG/DL — SIGNIFICANT CHANGE UP (ref 70–99)
HCT VFR BLD CALC: 33.4 % — LOW (ref 42–52)
HGB BLD-MCNC: 11.8 G/DL — LOW (ref 14–18)
IMM GRANULOCYTES NFR BLD AUTO: 0.1 % — SIGNIFICANT CHANGE UP (ref 0.1–0.3)
LYMPHOCYTES # BLD AUTO: 2.93 K/UL — SIGNIFICANT CHANGE UP (ref 1.2–3.4)
LYMPHOCYTES # BLD AUTO: 38.5 % — SIGNIFICANT CHANGE UP (ref 20.5–51.1)
MAGNESIUM SERPL-MCNC: 1.9 MG/DL — SIGNIFICANT CHANGE UP (ref 1.8–2.4)
MCHC RBC-ENTMCNC: 28 PG — SIGNIFICANT CHANGE UP (ref 27–31)
MCHC RBC-ENTMCNC: 35.3 G/DL — SIGNIFICANT CHANGE UP (ref 32–37)
MCV RBC AUTO: 79.3 FL — LOW (ref 80–94)
MONOCYTES # BLD AUTO: 0.73 K/UL — HIGH (ref 0.1–0.6)
MONOCYTES NFR BLD AUTO: 9.6 % — HIGH (ref 1.7–9.3)
NEUTROPHILS # BLD AUTO: 3.63 K/UL — SIGNIFICANT CHANGE UP (ref 1.4–6.5)
NEUTROPHILS NFR BLD AUTO: 47.6 % — SIGNIFICANT CHANGE UP (ref 42.2–75.2)
NRBC # BLD: 0 /100 WBCS — SIGNIFICANT CHANGE UP (ref 0–0)
PLATELET # BLD AUTO: 166 K/UL — SIGNIFICANT CHANGE UP (ref 130–400)
POTASSIUM SERPL-MCNC: 3.5 MMOL/L — SIGNIFICANT CHANGE UP (ref 3.5–5)
POTASSIUM SERPL-SCNC: 3.5 MMOL/L — SIGNIFICANT CHANGE UP (ref 3.5–5)
RBC # BLD: 4.21 M/UL — LOW (ref 4.7–6.1)
RBC # FLD: 14.1 % — SIGNIFICANT CHANGE UP (ref 11.5–14.5)
SODIUM SERPL-SCNC: 131 MMOL/L — LOW (ref 135–146)
WBC # BLD: 7.62 K/UL — SIGNIFICANT CHANGE UP (ref 4.8–10.8)
WBC # FLD AUTO: 7.62 K/UL — SIGNIFICANT CHANGE UP (ref 4.8–10.8)

## 2022-04-15 PROCEDURE — 99238 HOSP IP/OBS DSCHRG MGMT 30/<: CPT

## 2022-04-15 RX ORDER — BENZTROPINE MESYLATE 1 MG
1 TABLET ORAL
Qty: 0 | Refills: 0 | DISCHARGE
Start: 2022-04-15

## 2022-04-15 RX ORDER — BENZTROPINE MESYLATE 1 MG
2 TABLET ORAL THREE TIMES A DAY
Refills: 0 | Status: DISCONTINUED | OUTPATIENT
Start: 2022-04-15 | End: 2022-04-15

## 2022-04-15 RX ORDER — NICOTINE POLACRILEX 2 MG
1 GUM BUCCAL
Qty: 0 | Refills: 0 | DISCHARGE
Start: 2022-04-15 | End: 2022-05-13

## 2022-04-15 RX ORDER — LIDOCAINE 4 G/100G
1 CREAM TOPICAL
Qty: 0 | Refills: 0 | DISCHARGE
Start: 2022-04-15

## 2022-04-15 RX ORDER — GABAPENTIN 400 MG/1
1 CAPSULE ORAL
Qty: 0 | Refills: 0 | DISCHARGE
Start: 2022-04-15

## 2022-04-15 RX ORDER — NICOTINE POLACRILEX 2 MG
1 GUM BUCCAL DAILY
Refills: 0 | Status: DISCONTINUED | OUTPATIENT
Start: 2022-04-15 | End: 2022-04-15

## 2022-04-15 RX ORDER — BENZTROPINE MESYLATE 1 MG
1 TABLET ORAL
Qty: 0 | Refills: 0 | DISCHARGE

## 2022-04-15 RX ADMIN — DIVALPROEX SODIUM 500 MILLIGRAM(S): 500 TABLET, DELAYED RELEASE ORAL at 06:47

## 2022-04-15 RX ADMIN — ENOXAPARIN SODIUM 40 MILLIGRAM(S): 100 INJECTION SUBCUTANEOUS at 13:14

## 2022-04-15 RX ADMIN — Medication 81 MILLIGRAM(S): at 13:19

## 2022-04-15 RX ADMIN — Medication 1 PATCH: at 13:14

## 2022-04-15 RX ADMIN — Medication 2 MILLIGRAM(S): at 06:46

## 2022-04-15 RX ADMIN — Medication 100 MILLIGRAM(S): at 06:47

## 2022-04-15 RX ADMIN — GABAPENTIN 300 MILLIGRAM(S): 400 CAPSULE ORAL at 06:47

## 2022-04-15 RX ADMIN — Medication 15 MILLIGRAM(S): at 06:46

## 2022-04-15 RX ADMIN — GABAPENTIN 300 MILLIGRAM(S): 400 CAPSULE ORAL at 13:19

## 2022-04-15 RX ADMIN — Medication 2 MILLIGRAM(S): at 13:15

## 2022-04-15 RX ADMIN — ATORVASTATIN CALCIUM 40 MILLIGRAM(S): 80 TABLET, FILM COATED ORAL at 13:19

## 2022-04-15 NOTE — PROGRESS NOTE ADULT - ATTENDING COMMENTS
61-year-old male past medical history of, Schizoaffective disorder with previous [facility] admission, hypertension hyperlipidemia, 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     #Progressive stiffness/tremors concerning for worsening parkinsonism due to psych meds  #Hx schizoaffective disorder  #Lower back pain  CT l-spine (04/14): Moderate neuroforaminal stenosis  - home meds: Buspirone 15mg, Haldol 50 mg IM q 4 weeks and 20mg and 5 mg PO, Benztropine 1 mg BID, and divalproex 1000 mg at bedtime   - Per psych, recd to continue haldol for now  - PT consult  - Lidocaine patch  - Gabapentin 300 TID  - Left message with outpatient psychiatrist, but unable to be reached. Will increase cogentin dose from 2mg BID to 2mg TID for now. Outpatient psych to f/u regarding initiating amantadine or selegiline. Pt states he was on amantadine before, but it was discontinued.  - Can begin DC planning    # Euvolemic Hyponatremia  # Hypomagnesemia, resolved  # Hypokalemia   - c/w benztropine  - monitor bmp , replete PRN    # HTN  - c/w amlodipine, losartan and labetalol     # Ambulate as tolerated  - fall precaution     # DASH diet    DVT PPX, Lovenox  DC planning

## 2022-04-15 NOTE — OCCUPATIONAL THERAPY INITIAL EVALUATION ADULT - LONG TERM MEMORY, REHAB EVAL
Size Of Lesion: 2-3mm intact Detail Level: Zone Detail Level: Detailed Size Of Lesion: 3mm Size Of Lesion: 6mm Size Of Lesion: 4mm Body Location Override (Optional - Billing Will Still Be Based On Selected Body Map Location If Applicable): right posterior shoulder x3 Size Of Lesion: 5mm Size Of Lesion: 12mm Size Of Lesion: 5 mm Size Of Lesion: 1 mm

## 2022-04-15 NOTE — DISCHARGE NOTE NURSING/CASE MANAGEMENT/SOCIAL WORK - NSDCPEFALRISK_GEN_ALL_CORE
For information on Fall & Injury Prevention, visit: [email or web address] OR  [email or web address] OR  https://www.cdc.gov/steadi/patient.html

## 2022-04-15 NOTE — DISCHARGE NOTE PROVIDER - NSDCMRMEDTOKEN_GEN_ALL_CORE_FT
amLODIPine 10 mg oral tablet: 1 tab(s) orally once a day (at bedtime)  aspirin 81 mg oral delayed release tablet: 1 tab(s) orally once a day  benztropine 2 mg oral tablet: 1 tab(s) orally 3 times a day  busPIRone 15 mg oral tablet: 1 tab(s) orally 2 times a day  divalproex sodium 500 mg oral delayed release tablet: 1 tab(s) orally 2 times a day  gabapentin 300 mg oral capsule: 1 cap(s) orally 3 times a day  haloperidol 20 mg oral tablet: 1 tab(s) orally once a day (at bedtime)  haloperidol 5 mg oral tablet: 1 tab(s) orally once a day  haloperidol 5 mg/mL injectable solution:   labetalol 100 mg oral tablet: 1 tab(s) orally 2 times a day  lidocaine 4% topical film: Apply topically to affected area once a day  Lipitor 40 mg oral tablet: 1 tab(s) orally once a day  nicotine 21 mg/24 hr transdermal film, extended release: 1  transdermal once a day  Vitamin D2 1.25 mg (50,000 intl units) oral capsule: 1 cap(s) orally once a week

## 2022-04-15 NOTE — DISCHARGE NOTE PROVIDER - DISCHARGE SERVICE FOR PATIENT
on the discharge service for the patient. I have reviewed and made amendments to the documentation where necessary.
Mendez Casey

## 2022-04-15 NOTE — DISCHARGE NOTE PROVIDER - PROVIDER TOKENS
PROVIDER:[TOKEN:[[id number]],FOLLOWUP:[Routine]],PROVIDER:[TOKEN:[[id number]],FOLLOWUP:[1 week]]

## 2022-04-15 NOTE — PROGRESS NOTE ADULT - SUBJECTIVE AND OBJECTIVE BOX
SAM REA 61y Male  MRN#: [id number]   CODE STATUS:________    Hospital Day: 3d    Pt is currently admitted with the primary diagnosis of     SUBJECTIVE  Overnight events   -No major overnight events  Subjective complaints                                             ----------------------------------------------------------  OBJECTIVE  PAST MEDICAL & SURGICAL HISTORY  HTN (hypertension)    Depression    Hyperlipemia    Schizophrenia    No significant past surgical history                                              -----------------------------------------------------------  ALLERGIES:  tetanus toxoid (Swelling)                                            ------------------------------------------------------------    HOME MEDICATIONS  Home Medications:  amLODIPine 10 mg oral tablet: 1 tab(s) orally once a day (at bedtime) (15 Jul 2021 14:37)  aspirin 81 mg oral delayed release tablet: 1 tab(s) orally once a day (15 Jul 2021 12:57)  benztropine 1 mg oral tablet: 1 tab(s) orally 2 times a day (12 Apr 2022 23:03)  busPIRone 15 mg oral tablet: 1 tab(s) orally 2 times a day (15 Jul 2021 14:37)  divalproex sodium 500 mg oral delayed release tablet: 1 tab(s) orally 2 times a day (15 Jul 2021 14:38)  haloperidol 20 mg oral tablet: 1 tab(s) orally once a day (at bedtime) (12 Apr 2022 23:08)  haloperidol 5 mg oral tablet: 1 tab(s) orally once a day (12 Apr 2022 23:08)  haloperidol 5 mg/mL injectable solution:  (12 Apr 2022 23:03)  labetalol 100 mg oral tablet: 1 tab(s) orally 2 times a day (12 Apr 2022 23:02)  Vitamin D2 1.25 mg (50,000 intl units) oral capsule: 1 cap(s) orally once a week (12 Apr 2022 23:02)                           MEDICATIONS:  STANDING MEDICATIONS  amLODIPine   Tablet 10 milliGRAM(s) Oral at bedtime  aspirin enteric coated 81 milliGRAM(s) Oral daily  atorvastatin Oral Tab/Cap - Peds 40 milliGRAM(s) Oral daily  benztropine 2 milliGRAM(s) Oral two times a day  busPIRone 15 milliGRAM(s) Oral two times a day  diVALproex  milliGRAM(s) Oral two times a day  enoxaparin Injectable 40 milliGRAM(s) SubCutaneous every 24 hours  ergocalciferol 95195 Unit(s) Oral every week  gabapentin 300 milliGRAM(s) Oral three times a day  haloperidol     Tablet 20 milliGRAM(s) Oral at bedtime  labetalol 100 milliGRAM(s) Oral two times a day  lidocaine   4% Patch 1 Patch Transdermal every 24 hours  nicotine -  14 mG/24Hr(s) Patch 1 patch Transdermal daily    PRN MEDICATIONS                                            ------------------------------------------------------------  VITAL SIGNS: Last 24 Hours  T(C): 37.1 (15 Apr 2022 05:04), Max: 37.1 (15 Apr 2022 05:04)  T(F): 98.8 (15 Apr 2022 05:04), Max: 98.8 (15 Apr 2022 05:04)  HR: 54 (15 Apr 2022 05:04) (54 - 80)  BP: 127/74 (15 Apr 2022 05:04) (127/74 - 177/87)  BP(mean): --  RR: 18 (15 Apr 2022 05:04) (18 - 20)  SpO2: 97% (14 Apr 2022 18:05) (97% - 97%)      04-13-22 @ 07:01  -  04-14-22 @ 07:00  --------------------------------------------------------  IN: 584 mL / OUT: 1150 mL / NET: -566 mL    04-14-22 @ 07:01  -  04-15-22 @ 06:57  --------------------------------------------------------  IN: 120 mL / OUT: 200 mL / NET: -80 mL                                             --------------------------------------------------------------  LABS:                        12.6   8.15  )-----------( 187      ( 14 Apr 2022 07:40 )             36.2     04-14    135  |  100  |  10  ----------------------------<  81  3.7   |  25  |  0.8    Ca    9.3      14 Apr 2022 07:40  Mg     2.1     04-14                  Culture - Urine (collected 12 Apr 2022 17:09)  Source: Clean Catch Clean Catch (Midstream)  Final Report (13 Apr 2022 19:50):    >=3 organisms. Probable collection contamination.                                                      --------------------------------------------------------------    PHYSICAL EXAM:  GENERAL: AAOx3. Well-nourished, laying in bed appearing in no acute distress  HEENT: No FNDs, atraumatic, normocephalic  LUNGS: Clear to auscultation bilaterally  HEART: S1/S2. No heaves or thrills  ABD: Soft, non-tender, non-distended.  EXT/NEURO: 5/5 strength in all extremity joints. Sensation and ROM grossly intact.  SKIN: No breaks, erythema, edema                                           --------------------------------------------------------------  61-year-old male past medical history of, Parkinson bipolar hypertension hyperlipidemia,  chronic hyponatremia secondary to psychogenic polydipsia, long h/o mental illness, presents with 1 month of progressively worsening stiffness to lower back and lower legs and tremors     # Progressive stiffness/tremors concerning for progression of parkinsonism   - c/w home meds: Buspirone 15mg, Haldol 50 mg IM q 4 weeks and 20mg and 5 mg PO, Benztropine 1 mg BID, and divalproex 1000 mg at bedtime   - As per neuro, this is likely medication induced parkinson's and recommended psych consult to change haldol, psych consulted, will c/w haldol for now  -CT lumbar 4/14- L3-L5 mild spinal canal stenosis, L4-S1 mild neuroforaminal narrowing    # Hyponatremia acute on chronic most likely 2/2 psychogenic polydipsia vs medication use  - urine Na 23, osm 55, serum osm 283  - tsh 2.04  -Nephro: 1.5L fluid restriction (on d/c as well)    # Schizoaffective disorder  - home meds: Buspirone 15mg, Haldol 50 mg IM q 4 weeks and 20mg and 5 mg PO, Benztropine 1 mg BID, and divalproex 1000 mg at bedtime    (currently normal affect)  - will c/w haldol for now as per psych  - monitor QT prolongation  -No need for inpt psych, obtain collateral about congentin dosage (if titration up has not worked, consider amantadine + selegiline)  - Dr. Garcia left msg for outpatient psych, still have not heard back  - Monitor EKG     #Misc  - DVT Prophylaxis: Lovenox subQ  - GI Prophylaxis: not indicated  - Diet: DASH/TLC    - Code Status: full code    #Progress Note Handoff  Pending (specify):  Family discussion:   Disposition:   PT 4/14- 35fw x 2 w RW, 2-5x wk     SAM REA 61y Male  MRN#: [id number]   CODE STATUS:________    Hospital Day: 3d    Pt is currently admitted with the primary diagnosis of hyponatremia, lumbago, possible EPS symptoms    SUBJECTIVE  Overnight events   -No major overnight events  Subjective complaints   -well this AM, c/o bad dreams, "I can't walk" (walked with PT yesterday)                                          ----------------------------------------------------------  OBJECTIVE  PAST MEDICAL & SURGICAL HISTORY  HTN (hypertension)    Depression    Hyperlipemia    Schizophrenia    No significant past surgical history                                              -----------------------------------------------------------  ALLERGIES:  tetanus toxoid (Swelling)                                            ------------------------------------------------------------    HOME MEDICATIONS  Home Medications:  amLODIPine 10 mg oral tablet: 1 tab(s) orally once a day (at bedtime) (15 Jul 2021 14:37)  aspirin 81 mg oral delayed release tablet: 1 tab(s) orally once a day (15 Jul 2021 12:57)  benztropine 1 mg oral tablet: 1 tab(s) orally 2 times a day (12 Apr 2022 23:03)  busPIRone 15 mg oral tablet: 1 tab(s) orally 2 times a day (15 Jul 2021 14:37)  divalproex sodium 500 mg oral delayed release tablet: 1 tab(s) orally 2 times a day (15 Jul 2021 14:38)  haloperidol 20 mg oral tablet: 1 tab(s) orally once a day (at bedtime) (12 Apr 2022 23:08)  haloperidol 5 mg oral tablet: 1 tab(s) orally once a day (12 Apr 2022 23:08)  haloperidol 5 mg/mL injectable solution:  (12 Apr 2022 23:03)  labetalol 100 mg oral tablet: 1 tab(s) orally 2 times a day (12 Apr 2022 23:02)  Vitamin D2 1.25 mg (50,000 intl units) oral capsule: 1 cap(s) orally once a week (12 Apr 2022 23:02)                           MEDICATIONS:  STANDING MEDICATIONS  amLODIPine   Tablet 10 milliGRAM(s) Oral at bedtime  aspirin enteric coated 81 milliGRAM(s) Oral daily  atorvastatin Oral Tab/Cap - Peds 40 milliGRAM(s) Oral daily  benztropine 2 milliGRAM(s) Oral two times a day  busPIRone 15 milliGRAM(s) Oral two times a day  diVALproex  milliGRAM(s) Oral two times a day  enoxaparin Injectable 40 milliGRAM(s) SubCutaneous every 24 hours  ergocalciferol 82689 Unit(s) Oral every week  gabapentin 300 milliGRAM(s) Oral three times a day  haloperidol     Tablet 20 milliGRAM(s) Oral at bedtime  labetalol 100 milliGRAM(s) Oral two times a day  lidocaine   4% Patch 1 Patch Transdermal every 24 hours  nicotine -  14 mG/24Hr(s) Patch 1 patch Transdermal daily    PRN MEDICATIONS                                            ------------------------------------------------------------  VITAL SIGNS: Last 24 Hours  T(C): 37.1 (15 Apr 2022 05:04), Max: 37.1 (15 Apr 2022 05:04)  T(F): 98.8 (15 Apr 2022 05:04), Max: 98.8 (15 Apr 2022 05:04)  HR: 54 (15 Apr 2022 05:04) (54 - 80)  BP: 127/74 (15 Apr 2022 05:04) (127/74 - 177/87)  BP(mean): --  RR: 18 (15 Apr 2022 05:04) (18 - 20)  SpO2: 97% (14 Apr 2022 18:05) (97% - 97%)      04-13-22 @ 07:01  -  04-14-22 @ 07:00  --------------------------------------------------------  IN: 584 mL / OUT: 1150 mL / NET: -566 mL    04-14-22 @ 07:01  -  04-15-22 @ 06:57  --------------------------------------------------------  IN: 120 mL / OUT: 200 mL / NET: -80 mL                                             --------------------------------------------------------------  LABS:                        12.6   8.15  )-----------( 187      ( 14 Apr 2022 07:40 )             36.2     04-14    135  |  100  |  10  ----------------------------<  81  3.7   |  25  |  0.8    Ca    9.3      14 Apr 2022 07:40  Mg     2.1     04-14                  Culture - Urine (collected 12 Apr 2022 17:09)  Source: Clean Catch Clean Catch (Midstream)  Final Report (13 Apr 2022 19:50):    >=3 organisms. Probable collection contamination.                                                      --------------------------------------------------------------    PHYSICAL EXAM:  GENERAL: AAOx3. Well-nourished, laying in bed appearing in no acute distress  HEENT: No FNDs, atraumatic, normocephalic  LUNGS: Clear to auscultation bilaterally  HEART: S1/S2. No heaves or thrills  ABD: Soft, non-tender, non-distended.  EXT/NEURO: 4/5 strength in all extremity joints, likely due to pt effort. Sensation and ROM grossly intact.  SKIN: No breaks, erythema, edema                                           --------------------------------------------------------------

## 2022-04-15 NOTE — PROGRESS NOTE ADULT - ASSESSMENT
61-year-old male past medical history of, Parkinson bipolar hypertension hyperlipidemia,  chronic hyponatremia secondary to psychogenic polydipsia, long h/o mental illness, presents with 1 month of progressively worsening stiffness to lower back and lower legs and tremors     # Progressive stiffness/tremors concerning for progression of parkinsonism   - c/w home meds: Buspirone 15mg, Haldol 50 mg IM q 4 weeks and 20mg and 5 mg PO, Benztropine 1 mg BID, and divalproex 1000 mg at bedtime   -Neuro/psych recs appreciated, will c/w same dose haldol  -attempting to obtain records from outpt psych, left message 4/14/22  -CT lumbar 4/14- L3-L5 mild spinal canal stenosis, L4-S1 mild neuroforaminal narrowing  -will need outpt PT    # Hyponatremia acute on chronic most likely 2/2 psychogenic polydipsia vs medication use  - urine Na 23, osm 55, serum osm 283  - tsh 2.04  -Nephro: 1.5L fluid restriction (on d/c as well)    # Schizoaffective disorder  - home meds: Buspirone 15mg, Haldol 50 mg IM q 4 weeks and 20mg and 5 mg PO, Benztropine 1 mg BID, and divalproex 1000 mg at bedtime    (currently normal affect)  -No need for inpt psych, obtain collateral about congentin dosage (if titration up has not worked, consider amantadine + selegiline)  -will increase congentin 2mg TID  - will c/w haldol for now as per psych  - Monitor EKG    #Misc  - DVT Prophylaxis: Lovenox subQ  - GI Prophylaxis: not indicated  - Diet: DASH/TLC    - Code Status: full code    #Progress Note Handoff  Pending (specify): placement needs  Disposition:   PT 4/14- 35fw x 2 w RW, 2-5x wk

## 2022-04-15 NOTE — DISCHARGE NOTE PROVIDER - HOSPITAL COURSE
HPI:  Mr. Simon  61-year-old male past medical history of, Parkinson bipolar hypertension hyperlipidemia,  chronic hyponatremia secondary to psychogenic polydipsia, long h/o mental illness, presents with 1 month of progressively worsening stiffness to lower back and lower legs.  Staff at [facility] states patient has had progressive decline in terms of his Parkinson disease.  No fall.  No numbness weakness tingling.  No back pain.  No bowel or bladder incontinence or retention.  No fever cough URI symptoms.  No chest pain shortness of breath.  No abdominal pain nausea vomiting diarrhea.    In the ED, vitals were BP: 159/62 HR: 55 RR: 20 Spo2 98% on RA, afebrile. Labs significant for Na 122, K 3.2, mag 1.6. UA negative. Patient given NS 1L bolus, Mag 2 mg IV and K 20 mEq in ED. Patient currently admitted to medicine for electrolyte abn, progression of parkinson's disease.     Hospital Course:  Pt admitted for lower extremity weakness/stiffness, hospital stay complicated by hyponatremia. Pt found to have mild spinal canal stenosis with neuroforaminal naorrwing of the lumbar spine. Imaging tdoes not fully correlate with pt weakness. PT ambulating with PT, to receive PT outside of the hospital for further treatment. Pt evaluated by neuro and psychiatry for tremors/stiffness. Initial thoughts per neuro rec holding haldol as symptoms likely drug-induced parkinsonism. Pt evaluated by psychiatry for medication adjustment, rec continuing current haldol dose and cover symptoms with antiparkinsonism medications. Attempted to contact outpatient psych for records of past treatments/treatment failures. Congentin titrated up with good effect. Pt determined stable for discharge.

## 2022-04-15 NOTE — OCCUPATIONAL THERAPY INITIAL EVALUATION ADULT - GENERAL OBSERVATIONS, REHAB EVAL
Pt encountered semi reclined in bed, + IV locked. Pt agreeable to bedside OT assessment, may be seen as confirmed with RN. Pt returned to bedside chair in NAD, + IV locked, + chair alarm. RN aware

## 2022-04-15 NOTE — DISCHARGE NOTE PROVIDER - CARE PROVIDER_API CALL
Gregg Raza)  EEGEpilepsy; Neurology  [address], [address]  [place] [address]  Phone: [phone]  Fax: [phone]  Follow Up Time: Routine    Wilmer Glez)  [address] [place] [address], [address]  [place] [address]  Phone: [phone]  Fax: [phone]  Follow Up Time: 1 week

## 2022-04-15 NOTE — OCCUPATIONAL THERAPY INITIAL EVALUATION ADULT - PLANNED THERAPY INTERVENTIONS, OT EVAL
ADL retraining/balance training/bed mobility training/cognitive, visual perceptual/fine motor coordination training/motor coordination training/neuromuscular re-education/parent/caregiver training.../transfer training

## 2022-04-15 NOTE — DISCHARGE NOTE PROVIDER - CARE PROVIDERS DIRECT ADDRESSES
[email or web address]

## 2022-04-15 NOTE — DISCHARGE NOTE NURSING/CASE MANAGEMENT/SOCIAL WORK - PATIENT PORTAL LINK FT
You can access the FollowMyHealth Patient Portal offered by [facility] by registering at the following website: [email or web address]. By joining [facility]’s FollowMyHealth portal, you will also be able to view your health information using other applications (apps) compatible with our system.

## 2022-04-15 NOTE — DISCHARGE NOTE PROVIDER - NSDCCPCAREPLAN_GEN_ALL_CORE_FT
PRINCIPAL DISCHARGE DIAGNOSIS  Diagnosis: Hyponatremia  Assessment and Plan of Treatment: -Please continue to fluid restrict to 1.5L total liquid intake per day

## 2022-04-15 NOTE — DISCHARGE NOTE PROVIDER - NSDCCPTREATMENT_GEN_ALL_CORE_FT
PRINCIPAL PROCEDURE  Procedure: CT lumbar spine  Findings and Treatment: VERTEBRAL BODIES:  Vertebral bodies are normal in height. There is no   evidence of fracture.  ALIGNMENT:  No subluxations.  DISCS: Disc spaces are well-maintained.  L1-L2:  Normal.  L2-L3:  There is trace disc bulging indenting the ventral thecal sac as   well as mild facet arthrosis without spinal canal or neuroforaminal   narrowing.  L3-L4:  There is disc bulging, posterior ligamentous hypertrophy and   bilateral facet arthrosis contributing to mild spinal canal stenosis   without neuroforaminal narrowing.  L4-L5:  There is disc bulging, posterior ligamentous hypertrophy and   bilateral facet arthrosis contributing to moderate spinal canal stenosis   as well as mild bilateral neuroforaminal narrowing.  L5-S1:  There is disc bulging with left lateral extension as well as   severe right greater than left facet arthrosis contributing to moderate   left neuroforaminal narrowing. There is no spinal stenosis at this level.   There is mild right neuroforaminal narrowing.  MISCELLANEOUS:  There is fecal impaction within the rectum.  IMPRESSION:  Multilevel disc bulges and degenerative changes most significant at L4-5   and L5-S1 contributing to moderate spinal canal as well as neuroforaminal   narrowing as detailed above.  No evidence of fracture or subluxation.  Partially visualized rectal fecal impaction.

## 2022-04-21 DIAGNOSIS — M48.061 SPINAL STENOSIS, LUMBAR REGION WITHOUT NEUROGENIC CLAUDICATION: ICD-10-CM

## 2022-04-21 DIAGNOSIS — Z88.7 ALLERGY STATUS TO SERUM AND VACCINE: ICD-10-CM

## 2022-04-21 DIAGNOSIS — G21.11 NEUROLEPTIC INDUCED PARKINSONISM: ICD-10-CM

## 2022-04-21 DIAGNOSIS — F31.9 BIPOLAR DISORDER, UNSPECIFIED: ICD-10-CM

## 2022-04-21 DIAGNOSIS — E83.42 HYPOMAGNESEMIA: ICD-10-CM

## 2022-04-21 DIAGNOSIS — E78.5 HYPERLIPIDEMIA, UNSPECIFIED: ICD-10-CM

## 2022-04-21 DIAGNOSIS — R63.1 POLYDIPSIA: ICD-10-CM

## 2022-04-21 DIAGNOSIS — I10 ESSENTIAL (PRIMARY) HYPERTENSION: ICD-10-CM

## 2022-04-21 DIAGNOSIS — T43.4X5A ADVERSE EFFECT OF BUTYROPHENONE AND THIOTHIXENE NEUROLEPTICS, INITIAL ENCOUNTER: ICD-10-CM

## 2022-04-21 DIAGNOSIS — E87.1 HYPO-OSMOLALITY AND HYPONATREMIA: ICD-10-CM

## 2022-04-21 DIAGNOSIS — E87.6 HYPOKALEMIA: ICD-10-CM

## 2022-04-21 DIAGNOSIS — F17.210 NICOTINE DEPENDENCE, CIGARETTES, UNCOMPLICATED: ICD-10-CM
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
## 2022-04-21 DIAGNOSIS — F25.9 SCHIZOAFFECTIVE DISORDER, UNSPECIFIED: ICD-10-CM

## 2022-07-07 NOTE — ED ADULT NURSE NOTE - CCCP TRG CHIEF CMPLNT
suicidal thoughts Enbrel Counseling:  I discussed with the patient the risks of etanercept including but not limited to myelosuppression, immunosuppression, autoimmune hepatitis, demyelinating diseases, lymphoma, and infections.  The patient understands that monitoring is required including a PPD at baseline and must alert us or the primary physician if symptoms of infection or other concerning signs are noted.

## 2022-08-06 NOTE — DISCHARGE NOTE BEHAVIORAL HEALTH - PRINCIPAL DIAGNOSIS
[facility] EMERGENCY DEPARTMENT ENCOUNTER    Basic Information  Name: Rashid Reilly Age: 63year old Sex: female   MRN: [id number] Encounter: 8/6/2022, 6:04 AM  PCP: Nevaeh Johnson MD  Cardiology: Colin  Chief Complaint  Chief Complaint   Patient presents with   â¢ Chest Pain Adult       History of Present Illness    63 yr female presents emergency room via EMS secondary to chest pain. Patient states she woke up with midsternal chest pain with associated shortness of breath and nausea. Patient was given aspirin prior to arrival.  Patient states her pain is presently a 7 out of 10. Patient denies diaphoresis. Patient denies abdominal pain or back pain. Patient states she feels like she has been having indigestion. .  Patient with a family history of coronary artery disease. Patient states she was seen by Dr. Pantera Dietrich last week and is scheduled for carotid duplex    Health Status  Allergies:  ALLERGIES:  No Known Allergies    Current Medications:  Medications Prior to Admission   Medication Sig Dispense Refill   â¢ aspirin (ECOTRIN) 81 MG EC tablet Take 1 tablet by mouth daily. 30 tablet    â¢ losartan (COZAAR) 25 MG tablet Take 1 tablet by mouth daily. 90 tablet 0   â¢ atorvastatin (LIPITOR) 20 MG tablet Take 1 tablet by mouth daily.  90 tablet 0       Past Medical History    Past Medical History:   Diagnosis Date   â¢ Alcohol dependence in remission (CMS/HCC)    â¢ Anxiety    â¢ Cervical sprain    â¢ Depression    â¢ Essential (primary) hypertension    â¢ Gastroesophageal reflux disease    â¢ High cholesterol    â¢ Low back pain    â¢ Multiple sclerosis (CMS/HCC)    â¢ Polysubstance abuse (CMS/HCC)    â¢ Urinary tract infection        Past Surgical History:   Procedure Laterality Date   â¢ Foot surgery     â¢ Shoulder surg proc unlisted     â¢ Tonsillectomy         Family History   Problem Relation Age of Onset   â¢ Cancer, Breast Mother    â¢ Heart disease Father    â¢ Hypertension Father    â¢ Bipolar disorder Brother "      Social History     Tobacco Use   â¢ Smoking status: Current Every Day Smoker     Packs/day: 0.50   â¢ Smokeless tobacco: Never Used   Vaping Use   â¢ Vaping Use: never used   Substance Use Topics   â¢ Alcohol use: Not Currently   â¢ Drug use: Yes     Types: Marijuana       Review of Systems  All systems otherwise negative, ROS reviewed as documented in chart. REVIEW OF SYSTEMS:    General: No fever  Eye Problem(s): No eye pain  ENT Problem(s): No sore throat  Cardiovascular problem(s): + chest pain  Respiratory problem(s): No shortness of breath  Gastro-intestinal problem(s): No abdominal pain  Genito-urinary problem(s): No dysuria  Musculoskeletal problem(s): No joint pain  Integumentary problem(s): No rash  Neurological problem(s): No headache      Physical Exam    Vitals:    08/06/22 0932 08/06/22 0942 08/06/22 1015 08/06/22 1024   BP: (!) 159/73 (!) 159/82 (!) 161/77    BP Location:       Patient Position:       Pulse: 60 61 70    Resp: (!) 11 12 16    Temp:   97.5 Â°F (36.4 Â°C)    TempSrc:   Oral    SpO2: 99% 100% 99%    Weight:    50.2 kg (110 lb 10.7 oz)   Height:    5' 3"" (1.6 m)     General:  No acute distress. Alert. Skin:  Warm. Dry. Intact. No rash/lesions  Head:  Normocephalic. Atraumatic. Eye: EOMI. Normal conjunctiva. ENMT:  Oral mucosa moist. Posterior pharynx clear. Neck: Supple. Cardiovascular:  Regular rate and rhythm. Respiratory:  Lungs CTA. Respirations are non-labored. BS equal.  Gastrointestinal:  Soft. Nontender. Non distended. Normal BS. No peritoneal signs. Musculoskeletal:  Normal ROM. Extremities:  No cyanosis, no edema. Neurological:  A/O x 4. No focal neurological deficit observed. Normal speech. Normal sensory. Normal motor. Psychiatric: Cooperative. Appropriate mood and affect. Medical Decision Making  Rationale:  Multiple differential diagnoses were considered.  The patient / caregivers were apprised of diagnostic / treatment options including alternate modes of " care, in addition to the risks and benefits, for this medical condition. Based on this discussion the patient / caregiver agrees with this chosen diagnostic and treatment plan.     Orders:  Medications   nitroGLYcerin (NITROSTAT) sublingual tablet 0.4 mg (0.4 mg Sublingual Given 8/6/22 0654)   metoPROLOL succinate (TOPROL-XL) ER tablet 25 mg (25 mg Oral Given 8/6/22 1152)   losartan (COZAAR) tablet 25 mg (25 mg Oral Given 8/6/22 1152)   aspirin chewable 81 mg (81 mg Oral Given 8/6/22 1152)   atorvastatin (LIPITOR) tablet 40 mg (has no administration in time range)   sodium chloride 0.9 % flush bag 25 mL (has no administration in time range)   sodium chloride (PF) 0.9 % injection 2 mL (2 mLs Intracatheter Given 8/6/22 1152)   enoxaparin (LOVENOX) injection 50 mg (has no administration in time range)   enoxaparin (LOVENOX) injection 50 mg (50 mg Subcutaneous Given 8/6/22 0924)   nitroGLYcerin topical (NITRO-BID) 2 % ointment 1 inch (1 inch Topical Given 8/6/22 0924)         Laboratory Results:  Results for orders placed or performed during the hospital encounter of 08/06/22   Comprehensive Metabolic Panel   Result Value Ref Range    Fasting Status      Sodium 137 135 - 145 mmol/L    Potassium 3.9 3.4 - 5.1 mmol/L    Chloride 106 97 - 110 mmol/L    Carbon Dioxide 24 21 - 32 mmol/L    Anion Gap 11 7 - 19 mmol/L    Glucose 120 (H) 70 - 99 mg/dL    BUN 10 6 - 20 mg/dL    Creatinine 0.79 0.51 - 0.95 mg/dL    Glomerular Filtration Rate 87 >=60    BUN/ Creatinine Ratio 13 7 - 25    Calcium 9.6 8.4 - 10.2 mg/dL    Bilirubin, Total 0.3 0.2 - 1.0 mg/dL    GOT/AST 44 (H) <=37 Units/L    GPT/ALT 45 <64 Units/L    Alkaline Phosphatase 53 45 - 117 Units/L    Albumin 3.5 (L) 3.6 - 5.1 g/dL    Protein, Total 7.5 6.4 - 8.2 g/dL    Globulin 4.0 2.0 - 4.0 g/dL    A/G Ratio 0.9 (L) 1.0 - 2.4   TROPONIN I, HIGH SENSITIVITY   Result Value Ref Range    Troponin I, High Sensitivity 63 (HH) <52 ng/L   Magnesium   Result Value Ref Range Magnesium 2.0 1.7 - 2.4 mg/dL   CBC with Automated Differential (performable only)   Result Value Ref Range    WBC 9.1 4.2 - 11.0 K/mcL    RBC 3.50 (L) 4.00 - 5.20 mil/mcL    HGB 11.0 (L) 12.0 - 15.5 g/dL    HCT 32.7 (L) 36.0 - 46.5 %    MCV 93.4 78.0 - 100.0 fl    MCH 31.4 26.0 - 34.0 pg    MCHC 33.6 32.0 - 36.5 g/dL    RDW-CV 14.0 11.0 - 15.0 %    RDW-SD 47.6 39.0 - 50.0 fL     140 - 450 K/mcL    NRBC 0 <=0 /100 WBC    Neutrophil, Percent 52 %    Lymphocytes, Percent 37 %    Mono, Percent 7 %    Eosinophils, Percent 3 %    Basophils, Percent 1 %    Immature Granulocytes 0 %    Absolute Neutrophils 4.7 1.8 - 7.7 K/mcL    Absolute Lymphocytes 3.4 1.0 - 4.0 K/mcL    Absolute Monocytes 0.6 0.3 - 0.9 K/mcL    Absolute Eosinophils  0.3 0.0 - 0.5 K/mcL    Absolute Basophils 0.1 0.0 - 0.3 K/mcL    Absolute Immmature Granulocytes 0.0 0.0 - 0.2 K/mcL   TROPONIN I, HIGH SENSITIVITY   Result Value Ref Range    Troponin I, High Sensitivity 125 (HH) <52 ng/L   Alcohol   Result Value Ref Range    Alcohol None Detected None Detected mg/dL   Rapid SARS-CoV-2 by PCR    Specimen: Nasal, Mid-turbinate; Swab   Result Value Ref Range    Rapid SARS-COV-2 by PCR Not Detected Not Detected / Detected / Presumptive Positive / Inhibitors present    Isolation Guidelines      Procedural Comment         Radiology Results:  XR CHEST PA OR AP 1 VIEW   Final Result   No acute cardiopulmonary disease. Electronically Signed by: Vy Neumann M.D.     Signed on: 8/6/2022 7:09 AM          US Carotid Duplex    (Results Pending)        EKG   Final EKG interpretation by ED physician    EKG Interpretation  Rate: 57  Rhythm: Sinus bradycardia with T wave inversion laterally which does not appear to be new with comparison to February 2022  Abnormality: yes    Second EKG, 7:36 AM, sinus bradycardia, ST wave changes laterally, no acute ST wave elevation  ED Course  Vitals:    08/06/22 0932 08/06/22 0942 08/06/22 1015 08/06/22 1024   BP: (!) "159/73 (!) 159/82 (!) 161/77    BP Location:       Patient Position:       Pulse: 60 61 70    Resp: (!) 11 12 16    Temp:   97.5 Â°F (36.4 Â°C)    TempSrc:   Oral    SpO2: 99% 100% 99%    Weight:    50.2 kg (110 lb 10.7 oz)   Height:    5' 3"" (1.6 m)       ED Course as of 08/06/22 1414   Sat Aug 06, 2022   0728 Patient continues to have chest pain after 1 sublingual nitro, will repeat and do a second EKG [CANDICE]   0802 Discussed with Dr Kassidy Terry and will consult [CANDICE]   5011 Updated pt on results and plan for admission [CANDICE]   3326 Perfect Serve to NITO Mckeon, joss and will admit to Dr Gael Terry in ER to see pt; offered patient to go to Cath Lab, patient at this time wants to think about it and wants to be treated with medical management [CANDICE]      ED Course User Index  [CANDICE] Brenda Ornelas,        MEDICAL DECISION MAKING  50-year-old female presents emergency room with chest pain. EKG with lateral T wave inversion, first troponin elevated at 63, second troponin 125. Patient was given aspirin prior to arrival.  Patient was given nitroglycerin which initially did appear to decrease her discomfort but then patient refused anymore. Patient was given Lovenox and Nitropaste applied to the anterior chest wall. Dr. Kassidy Terry did come to the ER and evaluate the patient, recommended patient go to the Cath Lab which patient does not want to do at this time, she states she wants medical management. Patient will be admitted for further management. Critical Care Time:   Total critical care time I spent for this patient was 35 minutes. This time was exclusive of separately billable procedures. All applicable labs/imaging/vitals were reviewed. Multiple bedside re-evaluations were performed. Discussions were had with other physicians as noted. Failure to intervene on this patient would have resulted in significant deterioration in their condition. Impression and Plan  Diagnosis:  1.  NSTEMI (non-ST elevated " myocardial infarction) (CMS/HCC)        Condition:  STABLE    Disposition:  Admit 8/6/2022  9:00 AM  Telemetry Bed?: Yes  Admitting Physician: Yany Milton [[id number]]  Is this a telephone or verbal order?: This is a telephone order from the admitting physician         Kd Soares  08/06/22 2774 Chronic schizoaffective disorder with acute exacerbation

## 2022-08-10 ENCOUNTER — APPOINTMENT (OUTPATIENT)
Dept: NEUROLOGY | Facility: CLINIC | Age: 62
End: 2022-08-10

## 2022-08-10 VITALS
HEIGHT: 69 IN | OXYGEN SATURATION: 98 % | WEIGHT: 236 LBS | DIASTOLIC BLOOD PRESSURE: 85 MMHG | SYSTOLIC BLOOD PRESSURE: 133 MMHG | BODY MASS INDEX: 34.96 KG/M2 | HEART RATE: 65 BPM

## 2022-08-10 DIAGNOSIS — G25.1 DRUG-INDUCED TREMOR: ICD-10-CM

## 2022-08-10 DIAGNOSIS — Z79.899 OTHER LONG TERM (CURRENT) DRUG THERAPY: ICD-10-CM

## 2022-08-10 DIAGNOSIS — F31.9 BIPOLAR DISORDER, UNSPECIFIED: ICD-10-CM

## 2022-08-10 PROCEDURE — 99214 OFFICE O/P EST MOD 30 MIN: CPT

## 2022-08-10 NOTE — DISCUSSION/SUMMARY
[FreeTextEntry1] : Patient is a 61M with PMH of HLD, Bipolar Disorder, Parkinson's Disease, HTN from [facility] presenting for initial evaluation in the Movement Disorders Clinic at [facility] for tremors and Parkinson's. \par \par At this time, the patient's examination was significant for slight bradykinesia with finger tapping and mild right hand postural tremors. He is on numerous medications, including depakote and haldol, bothof which can cause tremors and haldol which can induce drug induced parkinsonism. He is already on benztropine. Unclear benefit from Pramipexole and would likely avoid as it can lead to impulse control disorder. \par A diagnosis of Parkinson's disease cannot be made at this time.\par \par Would recommend PT/OT/SLP as tolerated\par \par RTC PRN\par \par I spent 45 minutes on this encounter.

## 2022-08-10 NOTE — PHYSICAL EXAM
[Person] : oriented to person [Place] : oriented to place [FreeTextEntry4] : S [FreeTextEntry1] : Patient refused detail examination.\par \par Face is not significantly masked. Voice is normal. Speech is tangential with disorganized thoughts.\par No appreciable resting tremors. Mild postural tremors of the right hand. No significant action tremors. \par No significant bradykinesia with opening/closing hands and rapid alternating movements. Slight bradykinesia with finger tapping bilaterally.\par No trouble standing without arms. \par Gait is wide based and decreased arm swing bilaterally and multistep turns.

## 2022-08-10 NOTE — HISTORY OF PRESENT ILLNESS
[FreeTextEntry1] : Patient is a 61M with PMH of HLD, Bipolar Disorder, Parkinson's Disease, HTN from [facility] presenting for initial evaluation in the Movement Disorders Clinic at [facility] for tremors and Parkinson's. \par \par The patient is a poor historian. Called nursing home, but direct physician and nurse were not available. Spoke with another nurse who did not have much information but stated the patient was here for tremors and that there is some dizziness, but patient denies.\par \par The patient denies constipation.\par He denies trouble sleeping. \par He is able to feed himself and reports that the tremors are not bothering him.\par \par Active medications include;\par ASA 81mg daily\par Benztropine 2mg TID (PD)\par Buspirone 15mg BID (Bipolar)\par Depakote 500mg BID (Bipolar)\par Gabapentin 300mg TID (chronic pain)\par Haloperidol 20mg qhs (Bipolar)\par Labetalol 100mg BID (HTN)\par Lipitor 40mg daily (HLD)\par Pramipexole 0.5mg BID (PD)\par Amlodipine 10mg daily (HTN)

## 2022-08-31 NOTE — ED ADULT NURSE NOTE - CHIEF COMPLAINT QUOTE
PHARM TOLD PT THAT THE FLAGYL YOU SENT IN INTERACTS WITH HIS CHOLESTYRAMINE. WHAT ELSE CAN BE PRESCRIBED?  PT LEAVING FOR 18 DAY VACATION IN THE AM Pt came c/o lower back pain x 2 years, denies any injuries, wants to see "back doctor".

## 2022-09-07 NOTE — H&P ADULT - NSICDXNOPASTSURGICALHX_GEN_ALL_CORE
<-- Click to add NO significant Past Surgical History
DISPLAY PLAN FREE TEXT

## 2022-09-15 NOTE — PROGRESS NOTE BEHAVIORAL HEALTH - AFFECT RANGE
- Please follow up with your Primary Care Physician  - You may take Benadryl as needed for rash and itchiness    Allergic Reaction    An allergic reaction is an abnormal reaction to a substance (allergen) by the body's defense system. Common allergens include medicines, food, insect bites or stings, and blood products. The body releases certain proteins into the blood that can cause a variety of symptoms such as an itchy rash, wheezing, swelling of the face/lips/tongue/throat, abdominal pain, nausea or vomiting. An allergic reaction is usually treated with medication. If your health care provider prescribed you an epinephrine injection device, make sure to keep it with you at all times.    SEEK IMMEDIATE MEDICAL CARE IF YOU HAVE THE FOLLOWING SYMPTOMS: allergic reaction severe enough that required you to use epinephrine, tightness in your chest, swelling around your lips/tongue/throat, abdominal pain, vomiting or diarrhea, or lightheadedness/dizziness. These symptoms may represent a serious problem that is an emergency. Do not wait to see if the symptoms will go away. Use your auto-injector pen or anaphylaxis kit as you have been instructed, and get medical help right away. Call your local emergency services (911 in the [place] Do not drive yourself to the hospital.
Blunted
Blunted

## 2022-10-05 NOTE — ED ADULT NURSE NOTE - NS ED PATIENT SAFETY CONCERN
[facility]  [facility], [address], [place] [address]  Main: [phone], Fax: [phone]    Alex,   It was a pleasure seeing you today.  Thank you for allowing us to be involved in your care.  Please let us know if there is anything else we can do for you, so that we can be sure you are leaving completely satisfied with your care experience. Below is the plan that we discussed.     Please try to reduce use of NSAIDS to help minimize bruising. Definitely stop >7 days prior to procedures. Consider talking with your neurologist about any other treatment options.   You can trial Lysteda (tranexamic acid) during your period to see if it helps with the flow   At your next clinic visit, please have your labs drawn. I have placed orders for your iron panel to be redone as well as a factor XIII level. This is a very rare bleeding disorder but we should rule it out.       We would like a provider on our team to see you at least annually for optimal care and to allow us to continue to prescribe for you.      Return to clinic in 1 year.    Your nurse clinician is Scarlett Betancourt -[phone].   If they are unavailable and you have immediate concerns, please call [phone] and ask for a nurse.     Joyce Calzada, MPH, PA-C  [facility]      
No

## 2023-02-15 ENCOUNTER — EMERGENCY (EMERGENCY)
Facility: HOSPITAL | Age: 63
LOS: 0 days | Discharge: NURSING, REHAB OR LONG-TERM CARE | End: 2023-02-16
Attending: EMERGENCY MEDICINE
Payer: MEDICARE

## 2023-02-15 VITALS — WEIGHT: 184.97 LBS | HEIGHT: 68 IN

## 2023-02-15 DIAGNOSIS — E78.5 HYPERLIPIDEMIA, UNSPECIFIED: ICD-10-CM

## 2023-02-15 DIAGNOSIS — F32.A DEPRESSION, UNSPECIFIED: ICD-10-CM

## 2023-02-15 DIAGNOSIS — G20 PARKINSON'S DISEASE: ICD-10-CM

## 2023-02-15 DIAGNOSIS — R41.9 UNSPECIFIED SYMPTOMS AND SIGNS INVOLVING COGNITIVE FUNCTIONS AND AWARENESS: ICD-10-CM

## 2023-02-15 DIAGNOSIS — Z88.7 ALLERGY STATUS TO SERUM AND VACCINE: ICD-10-CM

## 2023-02-15 DIAGNOSIS — Z56.0 UNEMPLOYMENT, UNSPECIFIED: ICD-10-CM

## 2023-02-15 DIAGNOSIS — E87.1 HYPO-OSMOLALITY AND HYPONATREMIA: ICD-10-CM

## 2023-02-15 DIAGNOSIS — R45.851 SUICIDAL IDEATIONS: ICD-10-CM

## 2023-02-15 DIAGNOSIS — I10 ESSENTIAL (PRIMARY) HYPERTENSION: ICD-10-CM

## 2023-02-15 DIAGNOSIS — F02.80 DEMENTIA IN OTHER DISEASES CLASSIFIED ELSEWHERE, UNSPECIFIED SEVERITY, WITHOUT BEHAVIORAL DISTURBANCE, PSYCHOTIC DISTURBANCE, MOOD DISTURBANCE, AND ANXIETY: ICD-10-CM

## 2023-02-15 DIAGNOSIS — Z20.822 CONTACT WITH AND (SUSPECTED) EXPOSURE TO COVID-19: ICD-10-CM

## 2023-02-15 DIAGNOSIS — Z79.82 LONG TERM (CURRENT) USE OF ASPIRIN: ICD-10-CM

## 2023-02-15 DIAGNOSIS — F25.9 SCHIZOAFFECTIVE DISORDER, UNSPECIFIED: ICD-10-CM

## 2023-02-15 LAB
ANION GAP SERPL CALC-SCNC: 9 MMOL/L — SIGNIFICANT CHANGE UP (ref 7–14)
APAP SERPL-MCNC: <5 UG/ML — LOW (ref 10–30)
APPEARANCE UR: CLEAR — SIGNIFICANT CHANGE UP
BASOPHILS # BLD AUTO: 0.05 K/UL — SIGNIFICANT CHANGE UP (ref 0–0.2)
BASOPHILS NFR BLD AUTO: 0.6 % — SIGNIFICANT CHANGE UP (ref 0–1)
BILIRUB UR-MCNC: NEGATIVE — SIGNIFICANT CHANGE UP
BUN SERPL-MCNC: 13 MG/DL — SIGNIFICANT CHANGE UP (ref 10–20)
CALCIUM SERPL-MCNC: 9.1 MG/DL — SIGNIFICANT CHANGE UP (ref 8.4–10.5)
CHLORIDE SERPL-SCNC: 100 MMOL/L — SIGNIFICANT CHANGE UP (ref 98–110)
CO2 SERPL-SCNC: 25 MMOL/L — SIGNIFICANT CHANGE UP (ref 17–32)
COLOR SPEC: YELLOW — SIGNIFICANT CHANGE UP
CREAT SERPL-MCNC: 1 MG/DL — SIGNIFICANT CHANGE UP (ref 0.7–1.5)
DIFF PNL FLD: NEGATIVE — SIGNIFICANT CHANGE UP
EGFR: 86 ML/MIN/1.73M2 — SIGNIFICANT CHANGE UP
EOSINOPHIL # BLD AUTO: 0.38 K/UL — SIGNIFICANT CHANGE UP (ref 0–0.7)
EOSINOPHIL NFR BLD AUTO: 4.7 % — SIGNIFICANT CHANGE UP (ref 0–8)
ETHANOL SERPL-MCNC: <10 MG/DL — SIGNIFICANT CHANGE UP
GLUCOSE SERPL-MCNC: 130 MG/DL — HIGH (ref 70–99)
GLUCOSE UR QL: NEGATIVE MG/DL — SIGNIFICANT CHANGE UP
HCT VFR BLD CALC: 31.7 % — LOW (ref 42–52)
HGB BLD-MCNC: 11.1 G/DL — LOW (ref 14–18)
IMM GRANULOCYTES NFR BLD AUTO: 0.7 % — HIGH (ref 0.1–0.3)
KETONES UR-MCNC: NEGATIVE — SIGNIFICANT CHANGE UP
LEUKOCYTE ESTERASE UR-ACNC: NEGATIVE — SIGNIFICANT CHANGE UP
LYMPHOCYTES # BLD AUTO: 2.82 K/UL — SIGNIFICANT CHANGE UP (ref 1.2–3.4)
LYMPHOCYTES # BLD AUTO: 34.9 % — SIGNIFICANT CHANGE UP (ref 20.5–51.1)
MCHC RBC-ENTMCNC: 28.5 PG — SIGNIFICANT CHANGE UP (ref 27–31)
MCHC RBC-ENTMCNC: 35 G/DL — SIGNIFICANT CHANGE UP (ref 32–37)
MCV RBC AUTO: 81.5 FL — SIGNIFICANT CHANGE UP (ref 80–94)
MONOCYTES # BLD AUTO: 0.57 K/UL — SIGNIFICANT CHANGE UP (ref 0.1–0.6)
MONOCYTES NFR BLD AUTO: 7.1 % — SIGNIFICANT CHANGE UP (ref 1.7–9.3)
NEUTROPHILS # BLD AUTO: 4.2 K/UL — SIGNIFICANT CHANGE UP (ref 1.4–6.5)
NEUTROPHILS NFR BLD AUTO: 52 % — SIGNIFICANT CHANGE UP (ref 42.2–75.2)
NITRITE UR-MCNC: NEGATIVE — SIGNIFICANT CHANGE UP
NRBC # BLD: 0 /100 WBCS — SIGNIFICANT CHANGE UP (ref 0–0)
PH UR: 7 — SIGNIFICANT CHANGE UP (ref 5–8)
PLATELET # BLD AUTO: 192 K/UL — SIGNIFICANT CHANGE UP (ref 130–400)
POTASSIUM SERPL-MCNC: 3.4 MMOL/L — LOW (ref 3.5–5)
POTASSIUM SERPL-SCNC: 3.4 MMOL/L — LOW (ref 3.5–5)
PROT UR-MCNC: NEGATIVE MG/DL — SIGNIFICANT CHANGE UP
RBC # BLD: 3.89 M/UL — LOW (ref 4.7–6.1)
RBC # FLD: 13.3 % — SIGNIFICANT CHANGE UP (ref 11.5–14.5)
SALICYLATES SERPL-MCNC: <0.3 MG/DL — LOW (ref 4–30)
SODIUM SERPL-SCNC: 134 MMOL/L — LOW (ref 135–146)
SP GR SPEC: 1.01 — SIGNIFICANT CHANGE UP (ref 1.01–1.03)
UROBILINOGEN FLD QL: 0.2 MG/DL — SIGNIFICANT CHANGE UP
VALPROATE SERPL-MCNC: 60 UG/ML — SIGNIFICANT CHANGE UP (ref 50–100)
WBC # BLD: 8.08 K/UL — SIGNIFICANT CHANGE UP (ref 4.8–10.8)
WBC # FLD AUTO: 8.08 K/UL — SIGNIFICANT CHANGE UP (ref 4.8–10.8)

## 2023-02-15 PROCEDURE — 87635 SARS-COV-2 COVID-19 AMP PRB: CPT

## 2023-02-15 PROCEDURE — 80164 ASSAY DIPROPYLACETIC ACD TOT: CPT

## 2023-02-15 PROCEDURE — 80354 DRUG SCREENING FENTANYL: CPT

## 2023-02-15 PROCEDURE — 36415 COLL VENOUS BLD VENIPUNCTURE: CPT

## 2023-02-15 PROCEDURE — 80048 BASIC METABOLIC PNL TOTAL CA: CPT

## 2023-02-15 PROCEDURE — 99285 EMERGENCY DEPT VISIT HI MDM: CPT

## 2023-02-15 PROCEDURE — 80307 DRUG TEST PRSMV CHEM ANLYZR: CPT

## 2023-02-15 PROCEDURE — 81003 URINALYSIS AUTO W/O SCOPE: CPT

## 2023-02-15 PROCEDURE — 85025 COMPLETE CBC W/AUTO DIFF WBC: CPT

## 2023-02-15 PROCEDURE — 99284 EMERGENCY DEPT VISIT MOD MDM: CPT

## 2023-02-15 SDOH — ECONOMIC STABILITY - INCOME SECURITY: UNEMPLOYMENT, UNSPECIFIED: Z56.0

## 2023-02-15 NOTE — ED ADULT NURSE NOTE - NS_SISCREENINGSR_GEN_ALL_ED
Negative Valtrex Pregnancy And Lactation Text: this medication is Pregnancy Category B and is considered safe during pregnancy. This medication is not directly found in breast milk but it's metabolite acyclovir is present.

## 2023-02-15 NOTE — ED PROVIDER NOTE - NS ED ATTENDING STATEMENT MOD
This was a shared visit with the BLANK. I reviewed and verified the documentation and independently performed the documented:

## 2023-02-15 NOTE — ED PROVIDER NOTE - PHYSICAL EXAMINATION
Physical Exam    Vital Signs: I have reviewed the initial vital signs.  Constitutional:  no acute distress  Eyes: Conjunctiva pink, Sclera clear, PERRLA, EOMI.  Cardiovascular: S1 and S2, regular rate, regular rhythm, well-perfused extremities, radial pulses equal and 2+  Respiratory: unlabored respiratory effort, clear to auscultation bilaterally no wheezing, rales and rhonchi  Gastrointestinal: soft, non-tender abdomen, no pulsatile mass, normal bowl sounds  Musculoskeletal: supple neck, no lower extremity edema, no midline tenderness  Integumentary: warm, dry, no rash  Neurologic: awake, alert, cranial nerves II-XII grossly intact, extremities’ motor and sensory functions grossly intact  Psychiatric: appropriate mood, appropriate affect

## 2023-02-15 NOTE — ED PROVIDER NOTE - NSFOLLOWUPINSTRUCTIONS_ED_ALL_ED_FT
Help Prevent Suicide    WHAT YOU NEED TO KNOW:    What do I need to know about suicide prevention? A person may see suicide as the only way to escape emotional or physical pain and suffering. You can help provide emotional support for him or her and get the help he or she needs. Learn to recognize warning signs that the person may be considering suicide. Resources are available to help you and the person.    What warning signs should I watch for? Stress, a medical condition, or drug or alcohol use may increase the person's risk for suicide. Watch for warning signs, such as the following:   •Talking about a plan for attempting suicide, or suddenly deciding to make a will      •Cutting himself or herself, burning the skin with cigarettes, or driving recklessly      •Drug or alcohol use, not taking prescribed medicine, or taking too much prescribed medicine      •Sudden anger, lashing out at others, or seeming hopeless, anxious, or angry and then suddenly becoming happy or peaceful      •Not wanting to spend time with others or doing things he or she usually enjoys      •Trouble at work, or not showing up for work      •A change in the way he or she eats, sleeps, or dresses      •Weight gain or loss or having less energy than usual      •Trouble sleeping or spending a lot of time sleeping      •Giving away or throwing away his or her belongings      •Suddenly not going to therapy      Where can I go for more help if I think the person is considering suicide?     Contact a suicide prevention organization:   •For the [facility] Suicide and Crisis Lifeline: ?Call or text [facility]      ?Send a chat on [email or web address].org/chat      ?Call 6-542-340-3356 (1-800-273-TALK)      •For the Suicide Hotline, call 2-056-723-6338 (7-812-RUYRSIN)      What should I do if I think the person is considering suicide? Call the person's local emergency number (911 in the [place]) if you feel he or she is at immediate risk of suicide. Also call if he or she talks about an active suicide plan. Assume that the person intends to carry out his or her plan. The following are some things you can do:   •Contact the person's therapist. His or her healthcare provider can give you a list of therapists if he or she does not have one.      •Keep medicines, weapons, and alcohol out of the person's reach. Make sure you do not put yourself at risk if the person has a weapon.      •Do not leave the person alone if he or she says he or she wants to attempt suicide. Ask the person if he or she has a plan. Do not leave the person alone if you think he or she may try it.      How will healthcare providers help the person?   •Healthcare providers will ask questions about the person's suicide thoughts and plans. They will ask how often he or she thinks about suicide and if he or she has tried it before. They will ask if he or she hurt himself or herself, such as with cutting or reckless driving. They may ask if he or she has access to weapons or drugs.      •A healthcare provider will help the person create a safety plan. The plan includes a list of people or groups to contact if he or she has suicidal thoughts again. The list may include friends, family members, a spiritual leader, and others he or she trusts. The person may be asked to make a verbal agreement or sign a contract that he or she will not try to harm himself or herself.      What treatment may the person need?   •Medicines may be given to prevent mood swings, or to decrease anxiety or depression. The person will need to take all medicines as directed. A sudden stop can be harmful. It may take 4 to 6 weeks for the medicine to help him or her feel better.      •A therapist can help the person identify and change negative feelings or beliefs about himself or herself. This may also help change the way the he or she feels and acts. A therapist can also help the person find ways to cope with things that cannot be changed.      What can I do to help the person?   •Encourage the person to seek help for drug or alcohol abuse. Drugs and alcohol can increase suicidal thoughts and make the person more likely to act on them.      •Help the person connect with others. Encourage him or her to become involved in the community. Some examples include tutoring a young student, volunteering at a local organization, or joining a group exercise program.      •Exercise with the person. Exercise can lift his or her mood, increase energy, and make it easier to sleep at night.      •Encourage the person to try new things. Adults who are open to new experiences handle stress and change better than those who are not.      •Call, visit, or send postcards to the person often. Check on him or her after the loss of a pet, longtime friend, or child. Holidays, birthdays, and anniversaries can be difficult for a person after a loss. The loss of a spouse can be especially painful and lonely.      •Help the person schedule a visit with his or her Caodaism or spiritual leader. A Caodaism or spiritual leader may be able to offer additional support and resources to the person.      •Encourage the person to continue taking medicine and going to therapy. Medicine and therapy can help improve his or her mental health.      Where can I find support and more information?   •[facility] Suicide and Crisis Lifeline  [address]  [place], [place]  Phone: 6-506-632  Web Address: http://www.suicidepreventionlifeline.org OR https://[facility]/chat/    •Suicide Awareness Voices of Education  [address] [place], [place] [address]  Phone: 1-617.618.7139  Web Address: http://www.save.org or https://save.org/find-help/international-resources/        Call the person's local emergency number (911 in the ) if:   •The person has done something on purpose to hurt himself or herself.      •The person tries to attempt suicide.      •The person tells you he or she made a plan to attempt suicide.      When should I call the person's doctor or therapist?   •The person has serious thoughts of suicide, even after treatment.      •You begin to see warning signs that the person may be considering suicide.      •The person tells you he or she has more thoughts of suicide when alone.      •You have questions or concerns about the person's condition or care.      CARE AGREEMENT:    You have the right to help plan your care. Learn about your health condition and how it may be treated. Discuss treatment options with your healthcare providers to decide what care you want to receive. You always have the right to refuse treatment.

## 2023-02-15 NOTE — ED ADULT NURSE NOTE - DISCHARGE DATE/TIME
[FreeTextEntry1] : Reviewed and reconciled medications, allergies, PMHx, PSHx, SocHx, FMHx.\par \par Plan: \par Flexible laryngoscopy done. Recommended using nasal saline spray. Also advised to use Flonas per OB/GYN clearance.
16-Feb-2023 03:45

## 2023-02-15 NOTE — ED PROVIDER NOTE - OBJECTIVE STATEMENT
62 year old male past medical history of Hypertension, Schizophrenia comes to emergency room for suicidal thoughts. patient states he has been depressed because of living at NH and has been having increasing thoughts of wanting to hurt himself and cut his wrist. denies chest pain and shortness of breath. no fever/chills.

## 2023-02-15 NOTE — ED PROVIDER NOTE - PATIENT PORTAL LINK FT
You can access the FollowMyHealth Patient Portal offered by [facility] by registering at the following website: [email or web address]. By joining [facility]’s FollowMyHealth portal, you will also be able to view your health information using other applications (apps) compatible with our system.

## 2023-02-15 NOTE — ED PROVIDER NOTE - ATTENDING APP SHARED VISIT CONTRIBUTION OF CARE
63 yo m hx bipolar, htn, parkinsons, chronic pain, hld  pt presents for thoughts of self harm. pt is a somewhat limited historian but states he has worsening auditory hallucinations and has thoughts of self harm- cutting self. no actions taken. pt denies medical complaints. no overdose.  no homicidal ideation    vss  gen- NAD, aaox3  card-rrr  lungs-ctab, no wheezing or rhonchi  abd-sntnd, no guarding or rebound  neuro- full str/sensation, cn ii-xii grossly intact, normal coordination

## 2023-02-15 NOTE — ED PROVIDER NOTE - CLINICAL SUMMARY MEDICAL DECISION MAKING FREE TEXT BOX
Case signed out to me by Dr. Alonso -- patient sent from NH where he expressed desire to stab himself. he was seen by telepsych and cleared, feel this is related to dementia. They discussed discharge with NH. Patient will have increased supervision.

## 2023-02-16 VITALS
OXYGEN SATURATION: 97 % | HEART RATE: 56 BPM | SYSTOLIC BLOOD PRESSURE: 133 MMHG | DIASTOLIC BLOOD PRESSURE: 62 MMHG | RESPIRATION RATE: 18 BRPM

## 2023-02-16 DIAGNOSIS — F25.9 SCHIZOAFFECTIVE DISORDER, UNSPECIFIED: ICD-10-CM

## 2023-02-16 LAB
AMPHET UR-MCNC: NEGATIVE — SIGNIFICANT CHANGE UP
BARBITURATES UR SCN-MCNC: NEGATIVE — SIGNIFICANT CHANGE UP
BENZODIAZ UR-MCNC: NEGATIVE — SIGNIFICANT CHANGE UP
COCAINE METAB.OTHER UR-MCNC: NEGATIVE — SIGNIFICANT CHANGE UP
DRUG SCREEN 1, URINE RESULT: SIGNIFICANT CHANGE UP
FENTANYL UR QL: NEGATIVE — SIGNIFICANT CHANGE UP
METHADONE UR-MCNC: NEGATIVE — SIGNIFICANT CHANGE UP
OPIATES UR-MCNC: NEGATIVE — SIGNIFICANT CHANGE UP
OXYCODONE UR-MCNC: NEGATIVE — SIGNIFICANT CHANGE UP
PCP UR-MCNC: NEGATIVE — SIGNIFICANT CHANGE UP
PROPOXYPHENE QUALITATIVE URINE RESULT: NEGATIVE — SIGNIFICANT CHANGE UP
SARS-COV-2 RNA SPEC QL NAA+PROBE: SIGNIFICANT CHANGE UP
THC UR QL: NEGATIVE — SIGNIFICANT CHANGE UP

## 2023-02-16 PROCEDURE — 90792 PSYCH DIAG EVAL W/MED SRVCS: CPT | Mod: 95

## 2023-02-16 NOTE — ED BEHAVIORAL HEALTH NOTE - BEHAVIORAL HEALTH NOTE
==================     PRE-HOSPITAL COURSE     ===================     SOURCE:  RN and secondhand ED documentation     DETAILS: Patient was BIB EMS.     =========     ED COURSE     =========     SOURCE:  RN and secondhand ED documentation.     ARRIVAL:  Per chart and RN, patient was BIB EMS.     BELONGINGS:  Per RN, patient came with clothing and are placed with security. Patient currently in a gown with a 1:1 staff member.     BEHAVIOR: RN described patient to be presenting with drifting ideas, AAOx3, presenting as calm and cooperative, maintains good eye-contact, not presenting with violence/aggression. RN stated pt endorsed SI but did not admit to HI/AVH. RN stated that there are no marks, bruises, or lacerations on the body. RN stated that the patient appears to have good grooming and hygiene, patient ambulates without assistance.      TREATMENT:  Per chart and RN, patient did not receive any medications.     VISITORS:  Per RN, pt has no visitors at bedside.

## 2023-02-16 NOTE — ED BEHAVIORAL HEALTH ASSESSMENT NOTE - NSBHMSEGAIT_PSY_A_CORE
Final Anesthesia Post-op Assessment    Patient: Doreen Irwin  Procedure(s) Performed: LEFT URETEROSCOPY, USING HOLMIUM LASER AND STENT - LEFT  Anesthesia type: General    Vitals Value Taken Time   Temp 36.1 °C (97 °F) 02/26/21 1551   Pulse 103 02/26/21 1620   Resp 26 02/26/21 1620   SpO2 97 % 02/26/21 1620   /66 02/26/21 1620         Patient Location: Phase II  Post-op Vital Signs:stable  Level of Consciousness: participates in exam, awake, alert and oriented  Respiratory Status: spontaneous ventilation and unassisted  Cardiovascular blood pressure returned to baseline  Hydration: euvolemic  Pain Management: well controlled  Handoff: Handoff to receiving clinician was performed and questions were answered  Vomiting: none   Nausea: None  Airway Patency:patent  Post-op Assessment: awake, alert, appropriately conversant, or baseline, no complications, patient tolerated procedure well with no complications, no evidence of recall, dentition within defined limits, moving all extremities and No Corneal Abrasion      No complications documented.    Unable to assess

## 2023-02-16 NOTE — ED BEHAVIORAL HEALTH ASSESSMENT NOTE - NSBHMSETHTPROC_PSY_A_CORE
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    Anesthesia Type:   General     Note:    Oropharynx: oropharynx clear of all foreign objects  Level of Consciousness: drowsy  Oxygen Supplementation: blow-by O2    Independent Airway: airway patency satisfactory and stable  Dentition: dentition unchanged  Vital Signs Stable: post-procedure vital signs reviewed and stable  Report to RN Given: handoff report given  Patient transferred to: PACU    Handoff Report: Identifed the Patient, Identified the Reponsible Provider, Reviewed the pertinent medical history, Discussed the surgical course, Reviewed Intra-OP anesthesia mangement and issues during anesthesia, Set expectations for post-procedure period and Allowed opportunity for questions and acknowledgement of understanding      Vitals:  Vitals Value Taken Time   /74 11/24/21 1630   Temp     Pulse 111 11/24/21 1638   Resp 29 11/24/21 1638   SpO2 96 % 11/24/21 1638   Vitals shown include unvalidated device data.    Electronically Signed By: GOMEZ Gonzalez CRNA  November 24, 2021  4:40 PM   Perseverative

## 2023-02-16 NOTE — ED BEHAVIORAL HEALTH ASSESSMENT NOTE - SUMMARY
Pt is a 61 yo M, single, no children, domiciled at the [facility], unemployed, with PMH significant for Neurocognitive disorder, Parkinson's Disease, HTN, and chronic hyponatremia, with past psych h/o schizoaffective disorder, 2 past psych admissions to [facility] (7/17-8/15/19 and 8/8-9/6/19), with h/o conditional SI to cut self in the setting of disliking his residence, but has never acted on these thoughts, including no known suicide attempts or SIB, no known violence, no active substance use, in outpt psych treatment with Sarabjit Pitts DNP, on Cogentin 2 mg BID, Gabapentin 300 mg TID, Haldol 20 mg QHS, Pramipexole 0.5 mg BID, Depakote 500 mg BID, Buspar 15 mg BID, who presents to the ED BIBA a/b staff at his NH after pt expressed active SI.     The pt's current presentation seems consistent with his most recent  assessments, in which he expresses conditional SI and perseverated on being hospitalized in the setting of disliking his place of residence, but does not exhibit signs or symptoms of acute psychiatric decompensation on exam. This was corroborated by staff at his NH, who indicated that outside of expressing SI today, the patient has been functioning at baseline and there have been no acute safety concerns. Given the above, psychiatric hospitalization is not warranted at this time. To mitigate his risk of harm while in the ED, the pt completed a safety plan, was encouraged to inform staff at his NH of any safety concerns, and was encouraged to discuss medication adjustments with his outpt providers if he chooses. In addition, this writer recommended to staff at his NH that they continue to closely supervise the pt for safety, to update his PCP and psychiatric provider of his recent SI, and to return pt to the ED for acute safety concerns in the future. At this time, there are no acute contraindications to discharge from a psychiatric standpoint.

## 2023-02-16 NOTE — ED BEHAVIORAL HEALTH ASSESSMENT NOTE - HPI (INCLUDE ILLNESS QUALITY, SEVERITY, DURATION, TIMING, CONTEXT, MODIFYING FACTORS, ASSOCIATED SIGNS AND SYMPTOMS)
Pt is a 63 yo M, single, no children, domiciled at the [facility], unemployed, with PMH significant for Neurocognitive disorder, Parkinson's Disease, HTN, and chronic hyponatremia, with past psych h/o schizoaffective disorder, 2 past psych admissions to [facility] (7/17-8/15/19 and 8/8-9/6/19), with h/o chronic SI to cut self but has never acted on these thoughts, including no known suicide attempts or SIB, no known violence, no active substance use, in outpt psych treatment with Sarabjit Pitts DNP, on Cogentin 2 mg BID, Gabapentin 300 mg TID, Haldol 20 mg QHS, Pramipexole 0.5 mg BID, Depakote 500 mg BID, Buspar 15 mg BID, who presents to the ED BIBA a/b staff at his NH after pt expressed active SI.     On assessment, pt is A&O x 2 (oriented to location and situation; doesn't know the year, month, or date; doesn't know the president), is overall a poor historian, and presents as perseverative on being admitted to the hospital with impaired attention, but is otherwise calm with no e/o internal preoccupation. The pt initially could not recall why he was in the ED tonight, but later states he was thinking about cutting his wrists, and requests hospitalization. The pt reports he feels like dying, because he does not like his current residence and also does not like the side effects of his medications, but could not elaborate further. The pt could not tell this writer how long he's been suicidal nor could he identify any other contributing factor to his SI. However, he denies taking any preparatory steps toward killing himself, denies plan or intent to kill himself, and cites his family as a protective factor. The pt otherwise describes his mood as "not bad, not good", denies current or recent HI/PI, and when asked about A/VH, he replies "don't we all?", but didn't elaborate. Later in the interview, the pt seemingly retracts his SI when he states he does not want to cut himself. However, when told he may be discharged back to his NH, he states he wants to cut his wrists.    This writer spoke with nursing staff at pt's residence and per their collateral: The pt has a h/o a neurocognitive disorder and has been living at their facility for over a year. At baseline, he is A&O x1-2, is often confused, and is unable to have a meaningful conversation with others. Today, he verbalized he wanted to cut himself, which he has not done before, and staff referred him to the ED for a psych eval. The pt has otherwise been at his baseline, has been in good behavioral control, has not exhibited signs of psychiatric decompensation, and has not attempted to harm himself or others. While in the rehab he is adequately supervised in order to ensure his safety and he is in the care of a psychiatrist and PCP. RN agreed that staff would closely supervise pt if he were discharged back to their residence, they would inform the psychiatrist in the morning about his expressed SI, and they would return him to the ED for acute safety concerns in the future.

## 2023-02-16 NOTE — ED BEHAVIORAL HEALTH ASSESSMENT NOTE - OTHER PAST PSYCHIATRIC HISTORY (INCLUDE DETAILS REGARDING ONSET, COURSE OF ILLNESS, INPATIENT/OUTPATIENT TREATMENT)
Pt has a history of expressing active SI with thoughts to cut his wrists, including during his previous 2 admissions to [facility] and at [facility] on 7/8/21 and 11/31/21

## 2023-03-14 NOTE — ED ADULT NURSE NOTE - NSFALLRSKPASTHIST_ED_ALL_ED
Patient called because she missed her appt on 3/10. She said she was sick and not able to come in. Patient states she has worms in her her stool and wants to know if you can send something to the pharmacy for her. I informed the patient she may need to give a stool sample first.    Please advise, thank you. no

## 2023-03-28 NOTE — PROGRESS NOTE BEHAVIORAL HEALTH - PROBLEM SELECTOR PROBLEM 1
Reason for Visit  Telly Leal is a 34 year old year old male who is being seen for Cystic Fibrosis (F/u)    CF HPI  The patient was seen and examined by Rocael Miller   At the patient's last clinic visit in late October he had increased respiratory symptoms as well as a significant drop in his pulmonary function tests.  He was having a lot of back pain at the time which could have affected his PFT results.  I treated him with a course of levofloxacin with the plan to resume vest therapy and SOO nebs.  He was to return in 1 month but has been lost to follow-up.  Today, he reports having a good response to the antibiotics.  He has not had any sustained increase in respiratory symptoms since last fall.  He produces sputum a few times per week in small amounts.  His use of vest therapy has been sporadic but my sense is that it is approximately 1-2 times per week on average.  He has not been doing any formal exercise but reports baseline exertional dyspnea including with shoveling snow this winter.  No hemoptysis or chest pain.  Current Outpatient Medications   Medication     acetylcysteine (MUCOMYST) 20 % neb solution     albuterol (PROAIR HFA) 108 (90 BASE) MCG/ACT Inhaler     albuterol (PROVENTIL) (2.5 MG/3ML) 0.083% neb solution     azithromycin (ZITHROMAX) 250 MG tablet     blood glucose monitoring (NO BRAND SPECIFIED) test strip     Cholecalciferol 4000 UNITS CAPS     colistimethate/colistin-base activity (COLYMYCIN) 150 mg/2mL SOLR neb solution     CREON 81622-18207 units CPEP per EC capsule     cromolyn (INTAL) 20 MG/2ML neb solution     elexacaftor-tezacaftor-ivacaftor & ivacaftor (TRIKAFTA) 100-50-75 & 150 MG tablet pack     famotidine (PEPCID) 20 MG tablet     fluticasone (FLONASE) 50 MCG/ACT nasal spray     Multiple Vitamin (MULTIVITAMINS PO)     mvw complete formulation (SOFTGELS) capsule     Nutritional Supplements (NUTREN 2.0)     oseltamivir (TAMIFLU) 75 MG capsule     phytonadione (MEPHYTON) 
5 MG tablet     tobramycin, PF, (SOO) 300 MG/5ML neb solution     water for injection sterile SOLN     No current facility-administered medications for this visit.     No Known Allergies  Past Medical History:   Diagnosis Date     Chronic sinusitis      Cystic fibrosis with pulmonary manifestations (H)      Exocrine pancreatic insufficiency      GERD (gastroesophageal reflux disease)      Hemoptysis      Malnutrition (H)        Past Surgical History:   Procedure Laterality Date     IR MISCELLANEOUS PROCEDURE  2/6/2001     LOBECTOMY      right upper lobe     TUBES         Social History     Socioeconomic History     Marital status:      Spouse name: Not on file     Number of children: Not on file     Years of education: Not on file     Highest education level: Not on file   Occupational History     Occupation:      Employer: [employer]   Tobacco Use     Smoking status: Never     Smokeless tobacco: Never   Substance and Sexual Activity     Alcohol use: Not Currently     Alcohol/week: 0.0 standard drinks     Drug use: Not Currently     Sexual activity: Not on file   Other Topics Concern     Parent/sibling w/ CABG, MI or angioplasty before 65F 55M? Not Asked   Social History Narrative    Patient works for the [employer] as an .  He does not have stable living conditions at this time.  He has a Arabic guillen.     Social Determinants of Health     Financial Resource Strain: Not on file   Food Insecurity: Not on file   Transportation Needs: Not on file   Physical Activity: Not on file   Stress: Not on file   Social Connections: Not on file   Intimate Partner Violence: Not on file   Housing Stability: Not on file   Update: living with wife Bre in [facility]. Birth of daughter Nieves Rebolledo August, 2017, birth of daughter Josy Felder November, 2019    ROS Pulmonary  Const: No fever chills sweats.  GI: No diarrhea constipation abdominal pain or grease in his stools.  He has 
not used his tube feeds recently.  Endo: No hypoglycemic symptoms.  ENT: He had transient congestion of his sinuses that resolved promptly with Flonase.  Not needing this on a regular basis.  Musculoskeletal: His low back pain finally improved and he has been symptom free the past 3 months.  Endocrine: No hypoglycemic symptoms.  A complete ROS was otherwise negative except as noted in the HPI.  /60   Pulse 115   SpO2 97% , Weight 89.2 kg, BMI 24.8  Exam:   GENERAL APPEARANCE: Well developed, well nourished, alert, and in no apparent distress.  EYES: anicteric with injected appearing conjunctiva  HENT: No nasal discharge.  Oral mucosa is moist, without any lesions, no tonsillar enlargement, no oropharyngeal exudate.  RESP:  good air flow throughout.  Slightly coarse in posterior lung bases with forced expiration.  Normal chest wall excursion.    CV: Normal S1, S2, regular rhythm, normal rate. No murmur. No gallop. No LE edema.   ABDOMEN:  Bowel sounds normal, soft, nontender, no HSM or masses.   MS: extremities normal. No cyanosis.  SKIN:  PEG site without erythema.  Chest scar-like lesion upper sternum unchanged. Diffuse freckles without overt change.  NEURO: Mentation intact, speech normal, normal gait and stance  PSYCH: mentation appears normal. and affect normal/bright  Results:  Recent Results (from the past 168 hour(s))   General PFT Lab (Please always keep checked)    Collection Time: 03/28/23  2:54 PM   Result Value Ref Range    FVC-Pred 5.65 L    FVC-Pre 5.70 L    FVC-%Pred-Pre 100 %    FEV1-Pre 3.43 L    FEV1-%Pred-Pre 75 %    FEV1FVC-Pred 81 %    FEV1FVC-Pre 60 %    FEFMax-Pred 11.26 L/sec    FEFMax-Pre 10.20 L/sec    FEFMax-%Pred-Pre 90 %    FEF2575-Pred 4.35 L/sec    FEF2575-Pre 1.73 L/sec    DRU0122-%Pred-Pre 39 %    ExpTime-Pre 9.37 sec    FIFMax-Pre 9.13 L/sec    FEV1FEV6-Pred 82 %    FEV1FEV6-Pre 62 %     Spirometry interpretation: personally reviewed. Valid maneuver.  FEV1 improved by over 
5% and is now at the lower end of his previous baseline.  FVC up by 5% and remains 5 to 10% below his baseline of 1 year ago.  He has mild obstruction.    Most recent respiratory culture October, 2022 with 2 strains of Pseudomonas and Aspergillus fumigatus    Assessment and plan:  1.  Cystic fibrosis lung disease: Patient doing very well with low burden of symptoms.  His pulmonary function tests are still a bit down from his baseline of 1 year ago.  Low suspicion for uncontrolled infection.  We talked about more consistent performance of airway clearance being the key to sustained PFTs.  He will work on being more consistent with completing vest therapy and/or getting aerobic exercise to augment airway clearance so that he is doing something every day.  Will hold off on scheduled use of SOO nebs.  He is on chronic azithromycin.  1B.  CFTR modulation therapy:  As above, he has had an impressive response to Trikafta.  Most recent liver function tests from August without worsening of his elevated bilirubin and will continue current dosing.  Will get repeat liver function tests in conjunction with his next visit.  2. Impaired glucose tolerance: Last annual study fasting > 100 and peak > 200 and 2 hour value right at 140. A little worse than Jan 2018 but similar to previous. A1C most recently at 6.1%.  Will repeat glucose tolerance test as part of his annual studies next visit.  3.  Pancreatic exocrine insufficiency with a history of malnutrition on tube feeds: Adequate enzyme replacement by history.  Patient infrequently using tube feeds and his BMI has trended up to 24.8.  I do not see a compelling reason to resume tube feeds.  The patient would like to see how the summer goes as he historically has had more unwanted weight loss during hot weather.  4. Chronic CF sinus disease: Improved on modulation therapy. Continue flonase as needed.  5. Chest skin lesions: Seen by outside Dermatologist. Central chest scar could be 
treated by steroid injections per pt but not actively pursuing.  He is overdue for annual skin exam.  Will work on getting this scheduled.  6. Healthcare maintenance: Patient will complete annual studies in conjunction with his next visit.  He still needs to initiate colon cancer screening now that he is turned 41.  Will readdress this next visit.    Patient will follow up in 3 months with annual studies    Total visit time today 35 minutes.  This is exclusive of time interpreting PFTs.  Spirometry interpretation: personally reviewed. Valid maneuver.  FEV1 improved by over 5% and is now at the lower end of his previous baseline.  FVC up by 5% and remains 5 to 10% below his baseline of 1 year ago.  He has mild obstruction.    Rocael Miller                 
Acute exacerbation of subchronic schizoaffective schizophrenia
Acute exacerbation of subchronic schizoaffective schizophrenia

## 2023-07-31 NOTE — PROGRESS NOTE BEHAVIORAL HEALTH - HYGIENE
Fair Finasteride Counseling:  I discussed with the patient the risks of use of finasteride including but not limited to decreased libido, decreased ejaculate volume, gynecomastia, and depression. Women should not handle medication.  All of the patient's questions and concerns were addressed. Finasteride Male Counseling: Finasteride Counseling:  I discussed with the patient the risks of use of finasteride including but not limited to decreased libido, decreased ejaculate volume, gynecomastia, and depression. Women should not handle medication.  All of the patient's questions and concerns were addressed.

## 2023-10-28 NOTE — ED PROVIDER NOTE - NS ED ROS FT
Decreased functional mobility/capacity secondary to impairments listed below Constitutional: (-) fever  Eyes/ENT: (-) blurry vision, (-) epistaxis  Cardiovascular: (-) chest pain, (-) syncope  Respiratory: (-) cough, (-) shortness of breath  Gastrointestinal: (-) vomiting, (-) diarrhea  Musculoskeletal: (-) neck pain, (-) back pain, (-) joint pain  Integumentary: (-) rash, (-) edema  Neurological: (-) headache, (-) altered mental status

## 2023-11-07 NOTE — DISCHARGE NOTE NURSING/CASE MANAGEMENT/SOCIAL WORK - NSTOBACCONEVERSMOKERY/N_GEN_A
Chronically elevated.  Afebrile.  No obvious infectious process.  Referral to Hematology outpatient     Yes

## 2023-11-24 NOTE — ED BEHAVIORAL HEALTH ASSESSMENT NOTE - VETERAN
FUTURE VISIT INFORMATION      FUTURE VISIT INFORMATION:  Date: 11/29/23  Time: 9:00am  Location: [facility]  REFERRAL INFORMATION:  Referring provider:  Sarahi Guzmán MD   Referring providers clinic:  [facility]  Reason for visit/diagnosis  Unspecified visual disturbance     RECORDS REQUESTED FROM:       Clinic name Comments Records Status Imaging Status   [facility] 11/26/23 - ED OV with Dr. Gil Braxton  11/24/23 - ED OV with Dr. Trevino  11/21/23 - ED OV with Dr. Rodriguez  * Additional ED Visits in Care Everywhere Care Everywhere (dup chart)    [facility] 11/24/23, 11/13/23 - EYE OV with Dr. Vargas  11/21/23, 11/16/23 - EYE OV with Dr. Guzmán  11/20/23 -  PCC OV with Jethro Villegas NP  11/20/23 - EYE OV with Dr. David  11/16/23 - ENT OV with PRESLEY Pappas  11/13/23 - NEURO OV with PRESLEY Cee  9/19/23 - PCC OV with Dr. Arango  8/31/23 - EYE OV with Dr. Dimas  * Additional Office Visits in Care Everywhere Car Everywhere (dup chart)    [facility] - Imaging  Fax: [phone]  11/22/23, 11/13/22 - MRI Brain  11/21/23, 11/13/23 - MRA Head  12/4/22, 3/6/18, 8/17/15 - CT Head  3/6/18 - CT Maxillofacial Care Everywhere (dup chart) In PACs                              * Records in duplicate Chart (linked in the marked to Merge)  * 11/27/23 6:58 AM Faxed urg request to [facility] for images to be pushed to [place] PACs. - Jaclyn  * 11/27/23 2:16 PM Images received from [facility] and attached to the patient in PACs. - Jaclyn   No

## 2023-12-21 NOTE — OCCUPATIONAL THERAPY INITIAL EVALUATION ADULT - NS ASR FOLLOW COMMAND OT EVAL
7 (severe pain)
Pt follows multi-step commands 75% of the time./100% of the time/able to follow single-step instructions
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
## 2024-02-09 NOTE — PHYSICAL THERAPY INITIAL EVALUATION ADULT - ASSISTIVE DEVICE FOR TRANSFER: STAND/SIT, REHAB EVAL
Comments: Spoke with patient and pharmacy. She does need Buspar.    Last Office Visit (last PCP visit):   1/18/2024    Next Visit Date:  Future Appointments   Date Time Provider Department Center   2/16/2024  1:15 PM Akin Darby MD [facility]   4/19/2024  9:30 AM [place] CT ROOM 1 [facility] CT [facility] RAD   4/19/2024 10:00 AM Ilia Duggan MD [facility]       **If hasn't been seen in over a year OR hasn't followed up according to last diabetes/ADHD visit, make appointment for patient before sending refill to provider.    Rx requested:  Requested Prescriptions     Pending Prescriptions Disp Refills    busPIRone (BUSPAR) 10 MG tablet 180 tablet 1     Sig: Take 1 tablet by mouth 2 times daily                hands at edge of bed

## 2024-03-15 NOTE — PROGRESS NOTE BEHAVIORAL HEALTH - NSBHADMITIPDSM_PSY_A_CORE
benefits, and side effects of any administered or prescribed medications. All questions were answered. Return precautions for worsening of the condition or development of new concerning symptoms discussed. Patient and/or guardian was given educational information, verbalized understanding, and is in agreement with treatment plan. Patient exited clinic in stable condition.        Patient Instructions   Strep and flu test negative     Supportive care recommendations:   - Increase fluid intake. Recommend water and pedialyte.  - Rest  - OTC antihistamine, such as Claritin or Zyrtec  - OTC Flonase  - OTC Delysm or Robitussin for cough and congestion   - OTC motrin/tylenol for fever and/or body aches, unless contraindicated   - Utilize cool mist humidifier at night for nasal congestion  - Utilize throat lozenges, chloraseptic spray, honey, or salt water gargles for sore throat.   - The patient is to follow up with PCP or return to clinic if symptoms worsen/fail to improve.    Any condition can change, despite proper treatment. Therefore, if symptoms still persist or worsen after treatment plan intitated today, patient is to go to the nearest ER, call PCP, or return to urgent care for further evaluation. Urgent Care evaluation today is not a substitute for PCP visit. Follow up care is the patient's responsibility to discuss and review this UC visit.      Electronically signed by MICAELA Escamilla NP on 3/15/2024 at 6:06 PM    EMR Dragon/translation disclaimer: Much of this encounter note is an electronic transcription/translation of spoken language to printed text.  The electronic translation of spoken language may be erroneous, or at times, nonsensical words or phrases may be inadvertently transcribed.  Although I have reviewed the note for such errors, some may still exist.     
see above for Axis I, II, III

## 2024-04-05 ENCOUNTER — EMERGENCY (EMERGENCY)
Facility: HOSPITAL | Age: 64
LOS: 0 days | Discharge: HOME | End: 2024-04-05
Attending: STUDENT IN AN ORGANIZED HEALTH CARE EDUCATION/TRAINING PROGRAM
Payer: MEDICARE

## 2024-04-05 VITALS
SYSTOLIC BLOOD PRESSURE: 156 MMHG | HEART RATE: 86 BPM | OXYGEN SATURATION: 99 % | DIASTOLIC BLOOD PRESSURE: 89 MMHG | TEMPERATURE: 97 F | RESPIRATION RATE: 18 BRPM

## 2024-04-05 VITALS
DIASTOLIC BLOOD PRESSURE: 70 MMHG | WEIGHT: 255.96 LBS | TEMPERATURE: 98 F | HEART RATE: 89 BPM | OXYGEN SATURATION: 99 % | SYSTOLIC BLOOD PRESSURE: 143 MMHG | RESPIRATION RATE: 18 BRPM

## 2024-04-05 DIAGNOSIS — F32.A DEPRESSION, UNSPECIFIED: ICD-10-CM

## 2024-04-05 DIAGNOSIS — I10 ESSENTIAL (PRIMARY) HYPERTENSION: ICD-10-CM

## 2024-04-05 DIAGNOSIS — R41.9 UNSPECIFIED SYMPTOMS AND SIGNS INVOLVING COGNITIVE FUNCTIONS AND AWARENESS: ICD-10-CM

## 2024-04-05 DIAGNOSIS — E78.5 HYPERLIPIDEMIA, UNSPECIFIED: ICD-10-CM

## 2024-04-05 DIAGNOSIS — F25.9 SCHIZOAFFECTIVE DISORDER, UNSPECIFIED: ICD-10-CM

## 2024-04-05 DIAGNOSIS — Z88.7 ALLERGY STATUS TO SERUM AND VACCINE: ICD-10-CM

## 2024-04-05 DIAGNOSIS — G20.A1 PARKINSON'S DISEASE WITHOUT DYSKINESIA, WITHOUT MENTION OF FLUCTUATIONS: ICD-10-CM

## 2024-04-05 DIAGNOSIS — R45.851 SUICIDAL IDEATIONS: ICD-10-CM

## 2024-04-05 DIAGNOSIS — Z20.822 CONTACT WITH AND (SUSPECTED) EXPOSURE TO COVID-19: ICD-10-CM

## 2024-04-05 DIAGNOSIS — E87.1 HYPO-OSMOLALITY AND HYPONATREMIA: ICD-10-CM

## 2024-04-05 LAB
ANION GAP SERPL CALC-SCNC: 10 MMOL/L — SIGNIFICANT CHANGE UP (ref 7–14)
APAP SERPL-MCNC: <5 UG/ML — LOW (ref 10–30)
APPEARANCE UR: CLEAR — SIGNIFICANT CHANGE UP
BASOPHILS # BLD AUTO: 0.05 K/UL — SIGNIFICANT CHANGE UP (ref 0–0.2)
BASOPHILS NFR BLD AUTO: 0.8 % — SIGNIFICANT CHANGE UP (ref 0–1)
BILIRUB UR-MCNC: NEGATIVE — SIGNIFICANT CHANGE UP
BUN SERPL-MCNC: 9 MG/DL — LOW (ref 10–20)
CALCIUM SERPL-MCNC: 9.2 MG/DL — SIGNIFICANT CHANGE UP (ref 8.4–10.5)
CHLORIDE SERPL-SCNC: 97 MMOL/L — LOW (ref 98–110)
CO2 SERPL-SCNC: 25 MMOL/L — SIGNIFICANT CHANGE UP (ref 17–32)
COLOR SPEC: YELLOW — SIGNIFICANT CHANGE UP
CREAT SERPL-MCNC: 1 MG/DL — SIGNIFICANT CHANGE UP (ref 0.7–1.5)
DIFF PNL FLD: NEGATIVE — SIGNIFICANT CHANGE UP
EGFR: 85 ML/MIN/1.73M2 — SIGNIFICANT CHANGE UP
EOSINOPHIL # BLD AUTO: 0.22 K/UL — SIGNIFICANT CHANGE UP (ref 0–0.7)
EOSINOPHIL NFR BLD AUTO: 3.7 % — SIGNIFICANT CHANGE UP (ref 0–8)
ETHANOL SERPL-MCNC: <10 MG/DL — SIGNIFICANT CHANGE UP
GLUCOSE SERPL-MCNC: 97 MG/DL — SIGNIFICANT CHANGE UP (ref 70–99)
GLUCOSE UR QL: NEGATIVE MG/DL — SIGNIFICANT CHANGE UP
HCT VFR BLD CALC: 33.2 % — LOW (ref 42–52)
HGB BLD-MCNC: 11.5 G/DL — LOW (ref 14–18)
IMM GRANULOCYTES NFR BLD AUTO: 0.8 % — HIGH (ref 0.1–0.3)
KETONES UR-MCNC: NEGATIVE MG/DL — SIGNIFICANT CHANGE UP
LEUKOCYTE ESTERASE UR-ACNC: NEGATIVE — SIGNIFICANT CHANGE UP
LYMPHOCYTES # BLD AUTO: 1.75 K/UL — SIGNIFICANT CHANGE UP (ref 1.2–3.4)
LYMPHOCYTES # BLD AUTO: 29.5 % — SIGNIFICANT CHANGE UP (ref 20.5–51.1)
MCHC RBC-ENTMCNC: 27.9 PG — SIGNIFICANT CHANGE UP (ref 27–31)
MCHC RBC-ENTMCNC: 34.6 G/DL — SIGNIFICANT CHANGE UP (ref 32–37)
MCV RBC AUTO: 80.6 FL — SIGNIFICANT CHANGE UP (ref 80–94)
MONOCYTES # BLD AUTO: 0.57 K/UL — SIGNIFICANT CHANGE UP (ref 0.1–0.6)
MONOCYTES NFR BLD AUTO: 9.6 % — HIGH (ref 1.7–9.3)
NEUTROPHILS # BLD AUTO: 3.29 K/UL — SIGNIFICANT CHANGE UP (ref 1.4–6.5)
NEUTROPHILS NFR BLD AUTO: 55.6 % — SIGNIFICANT CHANGE UP (ref 42.2–75.2)
NITRITE UR-MCNC: NEGATIVE — SIGNIFICANT CHANGE UP
NRBC # BLD: 0 /100 WBCS — SIGNIFICANT CHANGE UP (ref 0–0)
PH UR: 7 — SIGNIFICANT CHANGE UP (ref 5–8)
PLATELET # BLD AUTO: 194 K/UL — SIGNIFICANT CHANGE UP (ref 130–400)
PMV BLD: 9.6 FL — SIGNIFICANT CHANGE UP (ref 7.4–10.4)
POTASSIUM SERPL-MCNC: 3.9 MMOL/L — SIGNIFICANT CHANGE UP (ref 3.5–5)
POTASSIUM SERPL-SCNC: 3.9 MMOL/L — SIGNIFICANT CHANGE UP (ref 3.5–5)
PROT UR-MCNC: NEGATIVE MG/DL — SIGNIFICANT CHANGE UP
RBC # BLD: 4.12 M/UL — LOW (ref 4.7–6.1)
RBC # FLD: 13.4 % — SIGNIFICANT CHANGE UP (ref 11.5–14.5)
SALICYLATES SERPL-MCNC: <0.3 MG/DL — LOW (ref 4–30)
SARS-COV-2 RNA SPEC QL NAA+PROBE: SIGNIFICANT CHANGE UP
SODIUM SERPL-SCNC: 132 MMOL/L — LOW (ref 135–146)
SP GR SPEC: <1.005 — LOW (ref 1–1.03)
UROBILINOGEN FLD QL: 0.2 MG/DL — SIGNIFICANT CHANGE UP (ref 0.2–1)
WBC # BLD: 5.93 K/UL — SIGNIFICANT CHANGE UP (ref 4.8–10.8)
WBC # FLD AUTO: 5.93 K/UL — SIGNIFICANT CHANGE UP (ref 4.8–10.8)

## 2024-04-05 PROCEDURE — 90792 PSYCH DIAG EVAL W/MED SRVCS: CPT | Mod: 95

## 2024-04-05 PROCEDURE — 87635 SARS-COV-2 COVID-19 AMP PRB: CPT

## 2024-04-05 PROCEDURE — 80048 BASIC METABOLIC PNL TOTAL CA: CPT

## 2024-04-05 PROCEDURE — 81003 URINALYSIS AUTO W/O SCOPE: CPT

## 2024-04-05 PROCEDURE — 93010 ELECTROCARDIOGRAM REPORT: CPT

## 2024-04-05 PROCEDURE — 93005 ELECTROCARDIOGRAM TRACING: CPT

## 2024-04-05 PROCEDURE — 99285 EMERGENCY DEPT VISIT HI MDM: CPT | Mod: 25

## 2024-04-05 PROCEDURE — 80307 DRUG TEST PRSMV CHEM ANLYZR: CPT

## 2024-04-05 PROCEDURE — 85025 COMPLETE CBC W/AUTO DIFF WBC: CPT

## 2024-04-05 PROCEDURE — 99285 EMERGENCY DEPT VISIT HI MDM: CPT

## 2024-04-05 NOTE — ED PROVIDER NOTE - NSFOLLOWUPCLINICS_GEN_ALL_ED_FT
[facility]  OP Mental Health  [address] [place] [id number]  Phone: [phone]  Fax:   Follow Up Time: 1-3 Days

## 2024-04-05 NOTE — ED PROVIDER NOTE - CLINICAL SUMMARY MEDICAL DECISION MAKING FREE TEXT BOX
63 yo M, from [facility], [facility] significant for Neurocognitive disorder, Parkinson's Disease, HTN, and chronic hyponatremia, with past psych h/o schizoaffective disorder, 2 past psych admissions to [facility] (7/17-8/15/19 and 8/8-9/6/19), presenting with non-specific suicidal ideation. He states he feels suicidal but does not have any specific plans or inciting factors. Denies chest pain, shortness of breath, nausea, vomiting, abdominal pain. Labs ordered and reviewed. Telepsych consulted and evaluated patient. Discussed with patient. Clear to discharge from psychiatry and patient not wanting admission. Discussed return precautions and follow up outpatient. Patient comfortable with plan and discharge back to nursing home.

## 2024-04-05 NOTE — ED PROVIDER NOTE - ATTENDING APP SHARED VISIT CONTRIBUTION OF CARE
63 yo M, from [facility], [facility] significant for Neurocognitive disorder, Parkinson's Disease, HTN, and chronic hyponatremia, with past psych h/o schizoaffective disorder, 2 past psych admissions to [facility] (7/17-8/15/19 and 8/8-9/6/19), presenting with non-specific suicidal ideation. He states he feels suicidal but does not have any specific plans or inciting factors. Denies chest pain, shortness of breath, nausea, vomiting, abdominal pain.    Elderly male in no acute distress  No external signs of trauma  No increased respiratory effort  AAOx3  Moves all extremities  No extremity edema    Labs ordered. Pending psych eval.

## 2024-04-05 NOTE — ED BEHAVIORAL HEALTH ASSESSMENT NOTE - SUMMARY
This is a 63 yo male, domiciled at [facility], with PMH neurocognitive disorder, Parkinson's Disease, HTN, chronic hyponatremia, PPH schizophrenia, remote long hospitalization at [facility] but also more recently in 2019 at [facility], denies past suicide attempts, who was sent from his nursing home for evaluation after reporting thoughts of cutting his wrist earlier today.    While patient is presenting with evidence of delusions and currently somewhat disorganized thought process, he is able to communicate clearly that his main interest is not to return to his current SNF and to go to his previous residence or home, to be able to smoke a cigar. His delusional and paranoid statements are interspersed around this more central dissatisfaction with his living situation, which is the thing the patient is perseverative on. Additionally, he has a long psychiatric history of severe mental illness as well as a diagnosis of dementia, and therefore quite limited cognitive functioning even at baseline. Patient states that he does not wish to harm himself, but expresses the thought of cutting his wrist transiently arose, and it appears to be related to his frustration. At this time patient is not endorsing SI, and given that he is in a supervised setting with numerous psychiatric medications for symptom control, he can continue working with his outpatient team for now, and be referred back if any further sign of decompensation or inability to manage symptoms.

## 2024-04-05 NOTE — ED PROVIDER NOTE - PATIENT PORTAL LINK FT
You can access the FollowMyHealth Patient Portal offered by [facility] by registering at the following website: [email or web address]. By joining [employer]’s FollowMyHealth portal, you will also be able to view your health information using other applications (apps) compatible with our system.

## 2024-04-05 NOTE — ED BEHAVIORAL HEALTH ASSESSMENT NOTE - DETAILS
conditional statement about cutting wrist if sent back but says he doesn't want to do it, doesn't want to die nursing staff will be informed to coordinate transfer. They had no objection previously. pt unable to participate

## 2024-04-05 NOTE — ED BEHAVIORAL HEALTH NOTE - BEHAVIORAL HEALTH NOTE
========================     FOR EACH COLLATERAL     ========================     Collateral below has requested that the information provided remain confidential: Yes [ X ] No [  ]     Collateral below has provided information that patient is/may be unaware of: Yes [  ] No [X  ]     Patient gives permission to obtain collateral from _____:     ( X ) Yes     (  )  No     Rationale for overriding objection               (  ) Lack of capacity. Details: ________               (  ) Assessing risk of danger to self/others. Details: ________     Rationale for selecting specific collateral source               (  ) Potential to impact risk of danger to self/others and no alternative equivalent. Details: _____     NAME: Nurse Supervisor at [facility]     NUMBER: [phone]     RELATIONSHIP: Nurse     RELIABILITY: Reliable     COMMENTS: Collateral was only able to provide information from a note from the NP at the agency as there was a recent shift change.      ========================     PATIENT DEMOGRAPHICS: Pt is a 63-year-old male, domiciled at the [facility].      ========================     HPI:      BASELINE FUNCTIONING: At baseline the collateral described the pt to have a calm affect and is cooperative with the faculty at the [facility]     DATE HPI STARTED: 4/4/24     DECOMPENSATION: According to collateral, the pt has been expressing Suicidal thoughts as he has threatened to cut himself. He has expressed that there is someone telling him to kill himself. Furthermore, the pt told the collateral that someone is out to harm him.      SUICIDALITY: Yes     VIOLENCE: None     SUBSTANCE: None     ========================     PAST PSYCHIATRIC HISTORY     ========================     DATE PAST PSYCHIATRIC HISTORY STARTED: Unknown     MAIN PSYCHIATRIC DIAGNOSIS: Bipolar Disorder     PSYCHIATRIC HOSPITALIZATIONS: Unknown     PRIOR ILLNESS: Pt is being followed by a therapist, NP and Psychiatrist at [facility]      SUICIDALITY: None     VIOLENCE: None     SUBSTANCE USE: None     ==============     OTHER HISTORY     ==============     CURRENT MEDICATION: Latuda 20 mg, Haldol, Depakote     MEDICAL HISTORY: Unknown     ALLERGIES: Unknown     LEGAL ISSUES: None     FIREARM ACCESS: None     SOCIAL HISTORY: Unknown     FAMILY HISTORY: Declined     DEVELOPMENTAL HISTORY: None                   ==============        COVID Exposure Screen- collateral (i.e. third-party, chart review, belongings, etc; include EMS and ED staff     ==============     Has the patient been tested for COVID-19 in the last 90 days?  (  ) Yes   ( x ) No   (  ) Unknown- Reason: _____     IF YES: Date of test(s), type of test(s), result(s) for ALL tests in last 90 days: ________     In the past 10 days, has the patient been around anyone with a positive COVID-19 test? (  ) Yes   (  ) No   (x  ) Unknown- Reason: ____     IF YES: Was the patient closer than 6 feet of them for a total of 15 minutes or more in a 24 hour period? (  ) Yes   (  ) No   (  ) Unknown- Reason: _____
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
## 2024-04-05 NOTE — ED PROVIDER NOTE - NSFOLLOWUPINSTRUCTIONS_ED_ALL_ED_FT
Managing Depression, Adult  Depression is a mental health condition that affects your thoughts, feelings, and actions. Being diagnosed with depression can bring you relief if you did not know why you have felt or behaved a certain way. It could also leave you feeling overwhelmed. Finding ways to manage your symptoms can help you feel more positive about your future.    How to manage lifestyle changes  Being depressed is difficult. Depression can increase the level of everyday stress. Stress can make depression symptoms worse. You may believe your symptoms cannot be managed or will never improve. However, there are many things you can try to help manage your symptoms. There is hope.    Managing stress    Person sitting at a desk and writing in a notebook.   Stress is your body's reaction to life changes and events, both good and bad. Stress can add to your feelings of depression. Learning to manage your stress can help lessen your feelings of depression.    Try some of the following approaches to reducing your stress (stress reduction techniques):  Listen to music that you enjoy and that inspires you.  Try using a meditation suzan or take a meditation class.  Develop a practice that helps you connect with your spiritual self. Walk in nature, pray, or go to a place of Gnosticism.  Practice deep breathing. To do this, inhale slowly through your nose. Pause at the top of your inhale for a few seconds and then exhale slowly, letting yourself relax. Repeat this three or four times.  Practice yoga to help relax and work your muscles.  Choose a stress reduction technique that works for you. These techniques take time and practice to develop. Set aside 5–15 minutes a day to do them. Therapists can offer training in these techniques. Do these things to help manage stress:  Keep a journal.  Know your limits. Set healthy boundaries for yourself and others, such as saying "no" when you think something is too much.  Pay attention to how you react to certain situations. You may not be able to control everything, but you can change your reaction.  Add humor to your life by watching funny movies or shows.  Make time for activities that you enjoy and that relax you.  Spend less time using electronics, especially at night before bed. The light from screens can make your brain think it is time to get up rather than go to bed.  Medicines    Medicines, such as antidepressants, are often a part of treatment for depression.  Talk with your pharmacist or health care provider about all the medicines, supplements, and herbal products that you take, their possible side effects, and what medicines and other products are safe to take together.  Make sure to report any side effects you may have to your health care provider.  Relationships    Your health care provider may suggest family therapy, couples therapy, or individual therapy as part of your treatment.    How to recognize changes  Everyone responds differently to treatment for depression. As you recover from depression, you may start to:  Have more interest in doing activities.  Feel more hopeful.  Have more energy.  Eat a more regular amount of food.  Have better mental focus.  It is important to recognize if your depression is not getting better or is getting worse. The symptoms you had in the beginning may return, such as:  Feeling tired.  Eating too much or too little.  Sleeping too much or too little.  Feeling restless, agitated, or hopeless.  Trouble focusing or making decisions.  Having unexplained aches and pains.  Feeling irritable, angry, or aggressive.  If you or your family members notice these symptoms coming back, let your health care provider know right away.    Follow these instructions at home:  Activity    Try to get some form of exercise each day, such as walking.  Try yoga, mindfulness, or other stress reduction techniques.  Participate in group activities if you are able.  Lifestyle    Get enough sleep.  Cut down on or stop using caffeine, tobacco, alcohol, and any other harmful substances.  Eat a healthy diet that includes plenty of vegetables, fruits, whole grains, low-fat dairy products, and lean protein. Limit foods that are high in solid fats, added sugar, or salt (sodium).  General instructions    Take over-the-counter and prescription medicines only as told by your health care provider.  Keep all follow-up visits. It is important for your health care provider to check on your mood, behavior, and medicines. Your health care provider may need to make changes to your treatment.  Where to find support  Talking to others    Two people walking outdoors.   Friends and family members can be sources of support and guidance. Talk to trusted friends or family members about your condition. Explain your symptoms and let them know that you are working with a health care provider to treat your depression. Tell friends and family how they can help.    Finances    Find mental health providers that fit with your financial situation.  Talk with your health care provider if you are worried about access to food, housing, or medicine.  Call your insurance company to learn about your co-pays and prescription plan.  Where to find more information  You can find support in your area from:  Anxiety and Depression Association of [place] (ADAA): adaa.org  Mental Health [place]: mentalhealthamer[place].net  National [place] [employer] Mental Illness: mike.org  Contact a health care provider if:  You stop taking your antidepressant medicines, and you have any of these symptoms:  Nausea.  Headache.  Light-headedness.  Chills and body aches.  Not being able to sleep (insomnia).  You or your friends and family think your depression is getting worse.  Get help right away if:  You have thoughts of hurting yourself or others.  Get help right away if you feel like you may hurt yourself or others, or have thoughts about taking your own life. Go to your nearest emergency room or:  Call 911.  Call the National Suicide Prevention Lifeline at 1-294.199.2085 or 998. This is open 24 hours a day.  Text the Crisis Text Line at 969776.  This information is not intended to replace advice given to you by your health care provider. Make sure you discuss any questions you have with your health care provider.
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
## 2024-04-05 NOTE — ED BEHAVIORAL HEALTH ASSESSMENT NOTE - CURRENT MEDICATION
Depakote 500 BID, Buspar 10 BID, Haldol 10 BID, gabapentin 300 BID, benztropine 1 TID, amitriptyline 50 qhs, amlodipine 10 daily, Latuda 20 daily

## 2024-04-05 NOTE — ED ADULT TRIAGE NOTE - SOURCE OF INFORMATION
Recommend Bilateral upper lid blepharoplasty. predeterm (discussed risks and benefits of sx. .. ). Patient/EMS

## 2024-04-05 NOTE — ED BEHAVIORAL HEALTH ASSESSMENT NOTE - NS ED BHA TELEPSYCH PROVIDER LOCATION
I reviewed the H&P, I examined the patient, and there are no changes in the patient's condition.     [address] [facility], [place], [place]

## 2024-05-17 NOTE — ED BEHAVIORAL HEALTH ASSESSMENT NOTE - CURRENT ACTIVE IDEATION
Occupational Therapy Tony     Patient Name: Prieto Tyson Sr.  Today's Date: 5/17/2024  Problem List  Principal Problem:    Syncope and collapse  Active Problems:    KARTHIKEYAN (acute kidney injury) (HCC)    Essential hypertension    Past Medical History  Past Medical History:   Diagnosis Date    Cardiac angina (HCC)     EF of 25%    Coronary artery disease     Hypertension      Past Surgical History  History reviewed. No pertinent surgical history.        05/17/24 1118   OT Last Visit   OT Visit Date 05/17/24   Note Type   Note type Screen   Additional Comments Order Received. Chart reviewed. Spoke with nursing, reporting pt has been independent in and out of room. Visited pt and spoke with patient. He reports he has been walking to bathroom in hallway independently without difficulty.  He reports no difficulties or concerns with ADLs at this time, as he feels he is at baseline. Will cancel orders. Encouraged pt to let nursing know if he were to notice or develop any OT concerns.     Taylor Steele          Yes

## 2024-06-24 NOTE — ED BEHAVIORAL HEALTH ASSESSMENT NOTE - NSBHMSEAFFRANGE_PSY_A_CORE
[facility]                          Canceled Therapy Appointment     Michael Lennon  MRN: [id number]    Patient canceled today's therapy appt on 6/24/2024 due to patient will be out of town and will call our therapy office back once he returns.    Solange Ray, PTA  6/24/2024           Constricted

## 2024-12-30 NOTE — ED BEHAVIORAL HEALTH ASSESSMENT NOTE - REFERRED BY
PATIENT HAS NEW [employer] GOLD STARTING IN 2025.    NEEDS NEW REFERRALS FOR:    ORTHO - DR RAMANA DEE DERM       Residence

## 2025-05-18 NOTE — SWALLOW BEDSIDE ASSESSMENT ADULT - SPECIFY REASON(S)
5/15/2025  I, Yuan Chun 
stroke code
Follow up

## 2025-05-19 ENCOUNTER — OUTPATIENT (OUTPATIENT)
Dept: OUTPATIENT SERVICES | Facility: HOSPITAL | Age: 65
LOS: 1 days | End: 2025-05-19
Payer: MEDICARE

## 2025-05-19 PROCEDURE — G0279: CPT | Mod: 26

## 2025-05-19 PROCEDURE — G0279: CPT

## 2025-06-27 NOTE — ED ADULT NURSE NOTE - TEMPLATE
Problem: At Risk for Falls  Goal: Patient does not fall  Outcome: Monitoring/Evaluating progress  Goal: Patient takes action to control fall-related risks  Outcome: Monitoring/Evaluating progress     Problem: At Risk for Injury Due to Fall  Goal: Patient does not fall  Outcome: Monitoring/Evaluating progress  Goal: Takes action to control condition specific risks  Outcome: Monitoring/Evaluating progress  Goal: Verbalizes understanding of fall-related injury personal risks  Description: Document education using the patient education activity  Outcome: Monitoring/Evaluating progress     Problem: Skin Integrity Alteration  Goal: Skin remains intact with no new/deterioration of wound or pressure injury  Outcome: Monitoring/Evaluating progress  Goal: Participates in wound care activities  Outcome: Monitoring/Evaluating progress     Problem: Ostomy Management  Goal: Maintains skin integrity around stoma  Outcome: Monitoring/Evaluating progress  Goal: Demonstrates ability to manage colostomy  Description: Document on Patient Education Activity   Outcome: Monitoring/Evaluating progress  Goal: Demonstrates ability to manage ileostomy  Description: Document on Patient Education Activity  Outcome: Monitoring/Evaluating progress  Goal: Demonstrates ability to manage urostomy  Description: Document on Patient Education Activity  Outcome: Monitoring/Evaluating progress  Goal: Demonstrates ability to manage nephrostomy tube  Description: Document on Patient Education Activity  Outcome: Monitoring/Evaluating progress      Cardiac